# Patient Record
Sex: MALE | Race: WHITE | NOT HISPANIC OR LATINO | Employment: OTHER | URBAN - METROPOLITAN AREA
[De-identification: names, ages, dates, MRNs, and addresses within clinical notes are randomized per-mention and may not be internally consistent; named-entity substitution may affect disease eponyms.]

---

## 2017-01-11 ENCOUNTER — GENERIC CONVERSION - ENCOUNTER (OUTPATIENT)
Dept: OTHER | Facility: OTHER | Age: 82
End: 2017-01-11

## 2017-01-12 ENCOUNTER — GENERIC CONVERSION - ENCOUNTER (OUTPATIENT)
Dept: OTHER | Facility: OTHER | Age: 82
End: 2017-01-12

## 2017-01-17 ENCOUNTER — ALLSCRIPTS OFFICE VISIT (OUTPATIENT)
Dept: OTHER | Facility: OTHER | Age: 82
End: 2017-01-17

## 2017-01-24 ENCOUNTER — ALLSCRIPTS OFFICE VISIT (OUTPATIENT)
Dept: OTHER | Facility: OTHER | Age: 82
End: 2017-01-24

## 2017-03-01 ENCOUNTER — GENERIC CONVERSION - ENCOUNTER (OUTPATIENT)
Dept: OTHER | Facility: OTHER | Age: 82
End: 2017-03-01

## 2017-04-12 ENCOUNTER — GENERIC CONVERSION - ENCOUNTER (OUTPATIENT)
Dept: OTHER | Facility: OTHER | Age: 82
End: 2017-04-12

## 2017-04-17 DIAGNOSIS — E78.5 HYPERLIPIDEMIA: ICD-10-CM

## 2017-04-17 DIAGNOSIS — I25.10 ATHEROSCLEROTIC HEART DISEASE OF NATIVE CORONARY ARTERY WITHOUT ANGINA PECTORIS: ICD-10-CM

## 2017-04-17 DIAGNOSIS — E11.9 TYPE 2 DIABETES MELLITUS WITHOUT COMPLICATIONS (HCC): ICD-10-CM

## 2017-04-17 DIAGNOSIS — E03.9 HYPOTHYROIDISM: ICD-10-CM

## 2017-05-16 ENCOUNTER — LAB CONVERSION - ENCOUNTER (OUTPATIENT)
Dept: OTHER | Facility: OTHER | Age: 82
End: 2017-05-16

## 2017-05-16 LAB
A/G RATIO (HISTORICAL): 1.2 (CALC) (ref 1–2.5)
ALBUMIN SERPL BCP-MCNC: 3.9 G/DL (ref 3.6–5.1)
ALP SERPL-CCNC: 57 U/L (ref 40–115)
ALT SERPL W P-5'-P-CCNC: 30 U/L (ref 9–46)
AST SERPL W P-5'-P-CCNC: 27 U/L (ref 10–35)
BILIRUB SERPL-MCNC: 0.4 MG/DL (ref 0.2–1.2)
BILIRUB UR QL STRIP: NEGATIVE
BUN SERPL-MCNC: 17 MG/DL (ref 7–25)
BUN/CREA RATIO (HISTORICAL): ABNORMAL (CALC) (ref 6–22)
CALCIUM SERPL-MCNC: 9.9 MG/DL (ref 8.6–10.3)
CHLORIDE SERPL-SCNC: 101 MMOL/L (ref 98–110)
CHOLEST SERPL-MCNC: 189 MG/DL (ref 125–200)
CHOLEST/HDLC SERPL: 5.1 (CALC)
CO2 SERPL-SCNC: 30 MMOL/L (ref 20–31)
COLOR UR: YELLOW
COMMENT (HISTORICAL): CLEAR
CREAT SERPL-MCNC: 0.99 MG/DL (ref 0.7–1.11)
CREATININE, RANDOM URINE (HISTORICAL): 95 MG/DL (ref 20–370)
EGFR AFRICAN AMERICAN (HISTORICAL): 80 ML/MIN/1.73M2
EGFR-AMERICAN CALC (HISTORICAL): 69 ML/MIN/1.73M2
FECAL OCCULT BLOOD DIAGNOSTIC (HISTORICAL): NEGATIVE
GAMMA GLOBULIN (HISTORICAL): 3.2 G/DL (CALC) (ref 1.9–3.7)
GLUCOSE (HISTORICAL): 143 MG/DL (ref 65–99)
GLUCOSE (HISTORICAL): ABNORMAL
HBA1C MFR BLD HPLC: 8.2 % OF TOTAL HGB
HDLC SERPL-MCNC: 37 MG/DL
KETONES UR STRIP-MCNC: NEGATIVE MG/DL
LDL CHOLESTEROL (HISTORICAL): 118 MG/DL (CALC)
LEUKOCYTE ESTERASE UR QL STRIP: NEGATIVE
MAGNESIUM, UR (HISTORICAL): 3.4 MG/DL
MICROALBUMIN/CREATININE RATIO (HISTORICAL): 36 MCG/MG CREAT
NITRITE UR QL STRIP: NEGATIVE
NON-HDL-CHOL (CHOL-HDL) (HISTORICAL): 152 MG/DL (CALC)
PH UR STRIP.AUTO: 6 [PH] (ref 5–8)
POTASSIUM SERPL-SCNC: 4.9 MMOL/L (ref 3.5–5.3)
PROT UR STRIP-MCNC: NEGATIVE MG/DL
SODIUM SERPL-SCNC: 137 MMOL/L (ref 135–146)
SP GR UR STRIP.AUTO: 1.02 (ref 1–1.03)
T3 SERPL-MCNC: 77 NG/DL (ref 76–181)
T4 FREE SERPL-MCNC: 1.7 NG/DL (ref 0.8–1.8)
TOTAL PROTEIN (HISTORICAL): 7.1 G/DL (ref 6.1–8.1)
TRIGL SERPL-MCNC: 170 MG/DL
TSH SERPL DL<=0.05 MIU/L-ACNC: 1.41 MIU/L (ref 0.4–4.5)

## 2017-05-18 ENCOUNTER — ALLSCRIPTS OFFICE VISIT (OUTPATIENT)
Dept: OTHER | Facility: OTHER | Age: 82
End: 2017-05-18

## 2017-05-22 ENCOUNTER — GENERIC CONVERSION - ENCOUNTER (OUTPATIENT)
Dept: OTHER | Facility: OTHER | Age: 82
End: 2017-05-22

## 2017-05-25 ENCOUNTER — ALLSCRIPTS OFFICE VISIT (OUTPATIENT)
Dept: OTHER | Facility: OTHER | Age: 82
End: 2017-05-25

## 2017-07-19 ENCOUNTER — GENERIC CONVERSION - ENCOUNTER (OUTPATIENT)
Dept: OTHER | Facility: OTHER | Age: 82
End: 2017-07-19

## 2017-07-24 ENCOUNTER — GENERIC CONVERSION - ENCOUNTER (OUTPATIENT)
Dept: OTHER | Facility: OTHER | Age: 82
End: 2017-07-24

## 2017-08-16 ENCOUNTER — LAB CONVERSION - ENCOUNTER (OUTPATIENT)
Dept: OTHER | Facility: OTHER | Age: 82
End: 2017-08-16

## 2017-08-16 LAB
A/G RATIO (HISTORICAL): 1.2 (CALC) (ref 1–2.5)
ALBUMIN SERPL BCP-MCNC: 4.2 G/DL (ref 3.6–5.1)
ALP SERPL-CCNC: 54 U/L (ref 40–115)
ALT SERPL W P-5'-P-CCNC: 37 U/L (ref 9–46)
AST SERPL W P-5'-P-CCNC: 33 U/L (ref 10–35)
BILIRUB SERPL-MCNC: 0.5 MG/DL (ref 0.2–1.2)
BUN SERPL-MCNC: 19 MG/DL (ref 7–25)
BUN/CREA RATIO (HISTORICAL): ABNORMAL (CALC) (ref 6–22)
CALCIUM SERPL-MCNC: 9.5 MG/DL (ref 8.6–10.3)
CHLORIDE SERPL-SCNC: 103 MMOL/L (ref 98–110)
CHOLEST SERPL-MCNC: 155 MG/DL (ref 125–200)
CHOLEST/HDLC SERPL: 3.8 (CALC)
CO2 SERPL-SCNC: 30 MMOL/L (ref 20–31)
CREAT SERPL-MCNC: 1 MG/DL (ref 0.7–1.11)
EGFR AFRICAN AMERICAN (HISTORICAL): 78 ML/MIN/1.73M2
EGFR-AMERICAN CALC (HISTORICAL): 67 ML/MIN/1.73M2
GAMMA GLOBULIN (HISTORICAL): 3.4 G/DL (CALC) (ref 1.9–3.7)
GLUCOSE (HISTORICAL): 176 MG/DL (ref 65–99)
HBA1C MFR BLD HPLC: 8.6 % OF TOTAL HGB
HDLC SERPL-MCNC: 41 MG/DL
LDL CHOLESTEROL (HISTORICAL): 88 MG/DL (CALC)
NON-HDL-CHOL (CHOL-HDL) (HISTORICAL): 114 MG/DL (CALC)
POTASSIUM SERPL-SCNC: 4.8 MMOL/L (ref 3.5–5.3)
SODIUM SERPL-SCNC: 140 MMOL/L (ref 135–146)
TOTAL PROTEIN (HISTORICAL): 7.6 G/DL (ref 6.1–8.1)
TRIGL SERPL-MCNC: 131 MG/DL
TSH SERPL DL<=0.05 MIU/L-ACNC: 1.5 MIU/L (ref 0.4–4.5)

## 2017-08-18 DIAGNOSIS — E78.5 HYPERLIPIDEMIA: ICD-10-CM

## 2017-08-18 DIAGNOSIS — E03.9 HYPOTHYROIDISM: ICD-10-CM

## 2017-08-18 DIAGNOSIS — E11.9 TYPE 2 DIABETES MELLITUS WITHOUT COMPLICATIONS (HCC): ICD-10-CM

## 2017-09-13 ENCOUNTER — GENERIC CONVERSION - ENCOUNTER (OUTPATIENT)
Dept: OTHER | Facility: OTHER | Age: 82
End: 2017-09-13

## 2017-09-19 ENCOUNTER — ALLSCRIPTS OFFICE VISIT (OUTPATIENT)
Dept: OTHER | Facility: OTHER | Age: 82
End: 2017-09-19

## 2017-09-20 ENCOUNTER — ALLSCRIPTS OFFICE VISIT (OUTPATIENT)
Dept: OTHER | Facility: OTHER | Age: 82
End: 2017-09-20

## 2017-09-22 ENCOUNTER — GENERIC CONVERSION - ENCOUNTER (OUTPATIENT)
Dept: OTHER | Facility: OTHER | Age: 82
End: 2017-09-22

## 2017-10-19 ENCOUNTER — ALLSCRIPTS OFFICE VISIT (OUTPATIENT)
Dept: OTHER | Facility: OTHER | Age: 82
End: 2017-10-19

## 2017-10-20 ENCOUNTER — GENERIC CONVERSION - ENCOUNTER (OUTPATIENT)
Dept: OTHER | Facility: OTHER | Age: 82
End: 2017-10-20

## 2018-01-13 VITALS
WEIGHT: 162 LBS | OXYGEN SATURATION: 98 % | BODY MASS INDEX: 24.55 KG/M2 | TEMPERATURE: 97.7 F | HEART RATE: 75 BPM | DIASTOLIC BLOOD PRESSURE: 78 MMHG | HEIGHT: 68 IN | SYSTOLIC BLOOD PRESSURE: 122 MMHG

## 2018-01-13 VITALS
OXYGEN SATURATION: 96 % | BODY MASS INDEX: 25.46 KG/M2 | DIASTOLIC BLOOD PRESSURE: 58 MMHG | HEIGHT: 68 IN | SYSTOLIC BLOOD PRESSURE: 122 MMHG | HEART RATE: 72 BPM | WEIGHT: 168 LBS

## 2018-01-14 VITALS
OXYGEN SATURATION: 95 % | DIASTOLIC BLOOD PRESSURE: 82 MMHG | HEIGHT: 68 IN | WEIGHT: 163.05 LBS | SYSTOLIC BLOOD PRESSURE: 146 MMHG | BODY MASS INDEX: 24.71 KG/M2 | HEART RATE: 85 BPM

## 2018-01-14 VITALS
HEIGHT: 68 IN | HEART RATE: 76 BPM | DIASTOLIC BLOOD PRESSURE: 76 MMHG | SYSTOLIC BLOOD PRESSURE: 124 MMHG | OXYGEN SATURATION: 97 % | WEIGHT: 162 LBS | BODY MASS INDEX: 24.55 KG/M2

## 2018-01-14 VITALS
WEIGHT: 164 LBS | SYSTOLIC BLOOD PRESSURE: 148 MMHG | BODY MASS INDEX: 24.86 KG/M2 | HEIGHT: 68 IN | HEART RATE: 99 BPM | OXYGEN SATURATION: 96 % | DIASTOLIC BLOOD PRESSURE: 76 MMHG

## 2018-01-14 VITALS
HEIGHT: 68 IN | SYSTOLIC BLOOD PRESSURE: 126 MMHG | OXYGEN SATURATION: 97 % | BODY MASS INDEX: 25.5 KG/M2 | HEART RATE: 57 BPM | DIASTOLIC BLOOD PRESSURE: 62 MMHG | WEIGHT: 168.25 LBS

## 2018-01-14 VITALS — WEIGHT: 168 LBS | BODY MASS INDEX: 25.54 KG/M2

## 2018-01-17 ENCOUNTER — GENERIC CONVERSION - ENCOUNTER (OUTPATIENT)
Dept: FAMILY MEDICINE CLINIC | Facility: CLINIC | Age: 83
End: 2018-01-17

## 2018-02-02 DIAGNOSIS — E78.5 HYPERLIPIDEMIA: ICD-10-CM

## 2018-02-02 DIAGNOSIS — E11.9 TYPE 2 DIABETES MELLITUS WITHOUT COMPLICATIONS (HCC): ICD-10-CM

## 2018-02-19 ENCOUNTER — OFFICE VISIT (OUTPATIENT)
Dept: FAMILY MEDICINE CLINIC | Facility: CLINIC | Age: 83
End: 2018-02-19
Payer: MEDICARE

## 2018-02-19 VITALS
HEIGHT: 68 IN | DIASTOLIC BLOOD PRESSURE: 80 MMHG | SYSTOLIC BLOOD PRESSURE: 146 MMHG | WEIGHT: 166.8 LBS | BODY MASS INDEX: 25.28 KG/M2

## 2018-02-19 DIAGNOSIS — I25.10 CORONARY ARTERY DISEASE INVOLVING NATIVE CORONARY ARTERY OF NATIVE HEART WITHOUT ANGINA PECTORIS: ICD-10-CM

## 2018-02-19 DIAGNOSIS — R05.3 CHRONIC COUGH: ICD-10-CM

## 2018-02-19 DIAGNOSIS — E03.9 HYPOTHYROIDISM, UNSPECIFIED TYPE: Primary | ICD-10-CM

## 2018-02-19 DIAGNOSIS — E11.9 TYPE 2 DIABETES MELLITUS WITHOUT COMPLICATION, WITHOUT LONG-TERM CURRENT USE OF INSULIN (HCC): ICD-10-CM

## 2018-02-19 DIAGNOSIS — E78.5 HYPERLIPIDEMIA, UNSPECIFIED HYPERLIPIDEMIA TYPE: ICD-10-CM

## 2018-02-19 DIAGNOSIS — K21.9 GASTROESOPHAGEAL REFLUX DISEASE, ESOPHAGITIS PRESENCE NOT SPECIFIED: ICD-10-CM

## 2018-02-19 PROCEDURE — 99214 OFFICE O/P EST MOD 30 MIN: CPT | Performed by: FAMILY MEDICINE

## 2018-02-19 RX ORDER — GLIPIZIDE 10 MG/1
2 TABLET ORAL 2 TIMES DAILY
COMMUNITY
Start: 2015-12-31 | End: 2018-11-27 | Stop reason: SDUPTHER

## 2018-02-19 RX ORDER — LEVOTHYROXINE SODIUM 0.12 MG/1
1 TABLET ORAL DAILY
COMMUNITY
Start: 2013-12-13 | End: 2018-12-31 | Stop reason: SDUPTHER

## 2018-02-19 RX ORDER — NITROGLYCERIN 0.4 MG/1
1 TABLET SUBLINGUAL
COMMUNITY
Start: 2014-09-23

## 2018-02-19 RX ORDER — METOPROLOL SUCCINATE 25 MG/1
0.5 TABLET, EXTENDED RELEASE ORAL DAILY
COMMUNITY
Start: 2014-06-10 | End: 2019-02-07 | Stop reason: SDUPTHER

## 2018-02-19 RX ORDER — PIOGLITAZONEHYDROCHLORIDE 45 MG/1
1 TABLET ORAL DAILY
COMMUNITY
Start: 2017-05-18 | End: 2021-01-01

## 2018-02-19 RX ORDER — METFORMIN HYDROCHLORIDE 500 MG/1
TABLET, EXTENDED RELEASE ORAL
COMMUNITY
Start: 2017-10-19 | End: 2018-02-19 | Stop reason: SDUPTHER

## 2018-02-19 RX ORDER — OMEPRAZOLE 40 MG/1
CAPSULE, DELAYED RELEASE ORAL
COMMUNITY
Start: 2015-09-29 | End: 2018-09-10 | Stop reason: SDUPTHER

## 2018-02-19 RX ORDER — METFORMIN HYDROCHLORIDE 500 MG/1
500 TABLET, EXTENDED RELEASE ORAL 2 TIMES DAILY WITH MEALS
Qty: 180 TABLET | Refills: 3 | Status: SHIPPED | OUTPATIENT
Start: 2018-02-19 | End: 2019-03-11 | Stop reason: SDUPTHER

## 2018-02-19 RX ORDER — POLYETHYLENE GLYCOL 3350 17 G/17G
POWDER, FOR SOLUTION ORAL
COMMUNITY
Start: 2015-08-21

## 2018-02-19 RX ORDER — LANOLIN ALCOHOL/MO/W.PET/CERES
CREAM (GRAM) TOPICAL
COMMUNITY

## 2018-02-19 RX ORDER — NATEGLINIDE 120 MG/1
TABLET ORAL
COMMUNITY
Start: 2014-08-03 | End: 2018-08-12 | Stop reason: SDUPTHER

## 2018-02-19 RX ORDER — OMEPRAZOLE 20 MG/1
1 CAPSULE, DELAYED RELEASE ORAL DAILY
COMMUNITY
Start: 2015-07-05 | End: 2018-09-06 | Stop reason: SDUPTHER

## 2018-02-19 RX ORDER — ATORVASTATIN CALCIUM 10 MG/1
1 TABLET, FILM COATED ORAL DAILY
COMMUNITY
Start: 2017-05-25 | End: 2018-08-26 | Stop reason: SDUPTHER

## 2018-02-19 NOTE — PROGRESS NOTES
Assessment/Plan:           Problem List Items Addressed This Visit     Coronary artery disease involving native coronary artery of native heart without angina pectoris - Primary    Relevant Medications    metoprolol succinate (TOPROL-XL) 25 mg 24 hr tablet    nitroglycerin (NITROSTAT) 0 4 mg SL tablet    Hyperlipidemia    Relevant Medications    atorvastatin (LIPITOR) 10 mg tablet    Hypothyroidism    Relevant Medications    levothyroxine 125 mcg tablet    metoprolol succinate (TOPROL-XL) 25 mg 24 hr tablet    Type 2 diabetes mellitus without complication, without long-term current use of insulin (HCC)    Relevant Medications    glipiZIDE (GLUCOTROL) 10 mg tablet    metFORMIN (GLUCOPHAGE-XR) 500 mg 24 hr tablet    nateglinide (STARLIX) 120 mg tablet    pioglitazone (ACTOS) 45 mg tablet            Subjective:      Patient ID: Shawn Bueno is a 80 y o  male  HPI    The following portions of the patient's history were reviewed and updated as appropriate: allergies, current medications, past family history, past medical history, past social history, past surgical history and problem list     Review of Systems      Objective:      /80   Ht 5' 8" (1 727 m)   Wt 75 7 kg (166 lb 12 8 oz)   BMI 25 36 kg/m²          Physical Exam   Constitutional: He is oriented to person, place, and time  He appears well-developed and well-nourished  HENT:   Head: Normocephalic and atraumatic  Right Ear: External ear normal    Left Ear: External ear normal    Eyes: EOM are normal  Pupils are equal, round, and reactive to light  Neck: Neck supple  Cardiovascular: Normal rate, regular rhythm and normal heart sounds  Pulmonary/Chest: Effort normal and breath sounds normal    Abdominal: Soft  Bowel sounds are normal    Musculoskeletal: Normal range of motion  Neurological: He is alert and oriented to person, place, and time  Skin: Skin is warm and dry  Psychiatric: He has a normal mood and affect   Thought content normal    Nursing note and vitals reviewed

## 2018-02-19 NOTE — PATIENT INSTRUCTIONS
He will follow up with Cardiology  He is offered pulmonology evaluation which he wished to hold off on at this time

## 2018-02-19 NOTE — PROGRESS NOTES
Assessment/Plan:           Problem List Items Addressed This Visit     Coronary artery disease involving native coronary artery of native heart without angina pectoris    Relevant Medications    metoprolol succinate (TOPROL-XL) 25 mg 24 hr tablet    nitroglycerin (NITROSTAT) 0 4 mg SL tablet    Hyperlipidemia    Relevant Medications    atorvastatin (LIPITOR) 10 mg tablet    Other Relevant Orders    Comprehensive metabolic panel    Lipid panel    Hypothyroidism - Primary    Relevant Medications    levothyroxine 125 mcg tablet    metoprolol succinate (TOPROL-XL) 25 mg 24 hr tablet    Other Relevant Orders    TSH, 3rd generation with T4 reflex    Type 2 diabetes mellitus without complication, without long-term current use of insulin (HCC)    Relevant Medications    glipiZIDE (GLUCOTROL) 10 mg tablet    nateglinide (STARLIX) 120 mg tablet    pioglitazone (ACTOS) 45 mg tablet    metFORMIN (GLUCOPHAGE-XR) 500 mg 24 hr tablet    Other Relevant Orders    Hemoglobin A1c    Chronic cough    Gastroesophageal reflux disease    Relevant Medications    omeprazole (PriLOSEC) 40 MG capsule    omeprazole (PRILOSEC) 20 mg delayed release capsule            Subjective:      Patient ID: Bhaskar Alvarado is a 80 y o  male  Patient comes in for a checkup  He complains of chronic cough  His blood sugars have been running high and he has restarted taking metformin 500 milligrams b i d           The following portions of the patient's history were reviewed and updated as appropriate: allergies, current medications, past family history, past medical history, past social history, past surgical history and problem list     Review of Systems   Constitutional: Negative  HENT: Negative  Respiratory: Negative  Cardiovascular: Negative  Objective:      /80   Ht 5' 8" (1 727 m)   Wt 75 7 kg (166 lb 12 8 oz)   BMI 25 36 kg/m²          Physical Exam   Constitutional: He is oriented to person, place, and time   He appears well-developed and well-nourished  HENT:   Head: Normocephalic and atraumatic  Right Ear: External ear normal    Left Ear: External ear normal    Eyes: EOM are normal  Pupils are equal, round, and reactive to light  Neck: Neck supple  Cardiovascular: Normal rate, regular rhythm and normal heart sounds  Pulses are no weak pulses  Pulses:       Dorsalis pedis pulses are 1+ on the right side, and 1+ on the left side  Posterior tibial pulses are 1+ on the right side, and 1+ on the left side  Pulmonary/Chest: Effort normal and breath sounds normal    Abdominal: Soft  Bowel sounds are normal    Musculoskeletal: Normal range of motion  Feet:   Right Foot:   Skin Integrity: Negative for ulcer, skin breakdown, erythema, warmth, callus or dry skin  Left Foot:   Skin Integrity: Negative for ulcer, skin breakdown, erythema, warmth, callus or dry skin  Neurological: He is alert and oriented to person, place, and time  Skin: Skin is warm and dry  Psychiatric: He has a normal mood and affect  Thought content normal    Nursing note and vitals reviewed  Diabetic Foot Exam    Patient's shoes and socks removed  Right Foot/Ankle   Right Foot Inspection  Skin Exam: skin normal and skin intact no dry skin, no warmth, no callus, no erythema, no maceration, no abnormal color, no pre-ulcer, no ulcer and no callus                          Toe Exam: ROM and strength within normal limits  Sensory   Vibration: intact  Proprioception: intact   Monofilament testing: intact  Vascular    The right DP pulse is 1+  The right PT pulse is 1+  Left Foot/Ankle  Left Foot Inspection  Skin Exam: skin normal and skin intactno dry skin, no warmth, no erythema, no maceration, normal color, no pre-ulcer, no ulcer and no callus                         Toe Exam: ROM and strength within normal limits                   Sensory   Vibration: intact  Proprioception: intact  Monofilament: intact  Vascular    The left DP pulse is 1+  The left PT pulse is 1+  Assign Risk Category:  No deformity present; No loss of protective sensation;  No weak pulses       Risk: 0

## 2018-03-20 ENCOUNTER — TELEPHONE (OUTPATIENT)
Dept: FAMILY MEDICINE CLINIC | Facility: CLINIC | Age: 83
End: 2018-03-20

## 2018-03-20 NOTE — TELEPHONE ENCOUNTER
Pt was called for jury duty - feels he is too old to go , cant hear well,  Etc- can he have an excuse letter ?   Call cell when done

## 2018-03-23 ENCOUNTER — TELEPHONE (OUTPATIENT)
Dept: FAMILY MEDICINE CLINIC | Facility: CLINIC | Age: 83
End: 2018-03-23

## 2018-03-23 ENCOUNTER — DOCUMENTATION (OUTPATIENT)
Dept: FAMILY MEDICINE CLINIC | Facility: CLINIC | Age: 83
End: 2018-03-23

## 2018-04-09 DIAGNOSIS — E11.9 TYPE 2 DIABETES MELLITUS WITHOUT COMPLICATION, WITHOUT LONG-TERM CURRENT USE OF INSULIN (HCC): Primary | ICD-10-CM

## 2018-05-16 LAB
ALBUMIN SERPL-MCNC: 3.9 G/DL (ref 3.6–5.1)
ALBUMIN/GLOB SERPL: 1.2 (CALC) (ref 1–2.5)
ALP SERPL-CCNC: 61 U/L (ref 40–115)
ALT SERPL-CCNC: 16 U/L (ref 9–46)
AST SERPL-CCNC: 19 U/L (ref 10–35)
BILIRUB SERPL-MCNC: 0.5 MG/DL (ref 0.2–1.2)
BUN SERPL-MCNC: 16 MG/DL (ref 7–25)
BUN/CREAT SERPL: ABNORMAL (CALC) (ref 6–22)
CALCIUM SERPL-MCNC: 9.3 MG/DL (ref 8.6–10.3)
CHLORIDE SERPL-SCNC: 105 MMOL/L (ref 98–110)
CHOLEST SERPL-MCNC: 137 MG/DL
CHOLEST/HDLC SERPL: 3.8 (CALC)
CO2 SERPL-SCNC: 27 MMOL/L (ref 20–31)
CREAT SERPL-MCNC: 1.05 MG/DL (ref 0.7–1.11)
GLOBULIN SER CALC-MCNC: 3.3 G/DL (CALC) (ref 1.9–3.7)
GLUCOSE SERPL-MCNC: 131 MG/DL (ref 65–99)
HBA1C MFR BLD: 10 % OF TOTAL HGB
HDLC SERPL-MCNC: 36 MG/DL
LDLC SERPL CALC-MCNC: 74 MG/DL (CALC)
NONHDLC SERPL-MCNC: 101 MG/DL (CALC)
POTASSIUM SERPL-SCNC: 4.7 MMOL/L (ref 3.5–5.3)
PROT SERPL-MCNC: 7.2 G/DL (ref 6.1–8.1)
SL AMB EGFR AFRICAN AMERICAN: 74 ML/MIN/1.73M2
SL AMB EGFR NON AFRICAN AMERICAN: 64 ML/MIN/1.73M2
SODIUM SERPL-SCNC: 141 MMOL/L (ref 135–146)
TRIGL SERPL-MCNC: 162 MG/DL
TSH SERPL-ACNC: 2.85 MIU/L (ref 0.4–4.5)

## 2018-05-21 ENCOUNTER — OFFICE VISIT (OUTPATIENT)
Dept: FAMILY MEDICINE CLINIC | Facility: CLINIC | Age: 83
End: 2018-05-21
Payer: MEDICARE

## 2018-05-21 VITALS
SYSTOLIC BLOOD PRESSURE: 122 MMHG | WEIGHT: 164 LBS | DIASTOLIC BLOOD PRESSURE: 72 MMHG | OXYGEN SATURATION: 98 % | BODY MASS INDEX: 24.94 KG/M2 | HEART RATE: 85 BPM | TEMPERATURE: 98 F

## 2018-05-21 DIAGNOSIS — E11.9 TYPE 2 DIABETES MELLITUS WITHOUT COMPLICATION, WITHOUT LONG-TERM CURRENT USE OF INSULIN (HCC): ICD-10-CM

## 2018-05-21 DIAGNOSIS — T14.8XXA ABRASION: ICD-10-CM

## 2018-05-21 DIAGNOSIS — E03.9 HYPOTHYROIDISM, UNSPECIFIED TYPE: Primary | ICD-10-CM

## 2018-05-21 DIAGNOSIS — E78.5 HYPERLIPIDEMIA, UNSPECIFIED HYPERLIPIDEMIA TYPE: ICD-10-CM

## 2018-05-21 DIAGNOSIS — I25.10 CORONARY ARTERY DISEASE INVOLVING NATIVE CORONARY ARTERY OF NATIVE HEART WITHOUT ANGINA PECTORIS: ICD-10-CM

## 2018-05-21 PROCEDURE — 99214 OFFICE O/P EST MOD 30 MIN: CPT | Performed by: FAMILY MEDICINE

## 2018-05-21 NOTE — PROGRESS NOTES
Assessment/Plan:           Problem List Items Addressed This Visit     CAD (coronary artery disease)     Follow-up with Cardiology         Hyperlipidemia    Relevant Orders    Comprehensive metabolic panel    Lipid panel    Hypothyroidism - Primary    Relevant Orders    T3, uptake    T4, free    TSH, 3rd generation with T4 reflex    Diabetes mellitus, type 2 ([address] )    Relevant Orders    HEMOGLOBIN A1C W/ EAG ESTIMATION    Abrasion     Will dressed with triple antibiotic ointment                 Subjective:      Patient ID: Nora Flores is a 80 y o  male  Patient comes in for a checkup  He has an abrasion on his epigastric area where the necklace from his emergency call button cyst on his stomach  The following portions of the patient's history were reviewed and updated as appropriate: allergies, current medications, past family history, past medical history, past social history, past surgical history and problem list     Review of Systems   Constitutional: Negative  HENT: Negative  Respiratory: Negative  Cardiovascular: Negative  Objective:      /72   Pulse 85   Temp 98 °F (36 7 °C)   Wt 74 4 kg (164 lb)   SpO2 98%   BMI 24 94 kg/m²          Physical Exam   Constitutional: He is oriented to person, place, and time  He appears well-developed and well-nourished  HENT:   Head: Normocephalic and atraumatic  Right Ear: Tympanic membrane normal    Left Ear: Tympanic membrane normal    Eyes: EOM are normal  Pupils are equal, round, and reactive to light  Neck: Neck supple  Cardiovascular: Normal rate, regular rhythm and normal heart sounds  Pulses are no weak pulses  Pulses:       Dorsalis pedis pulses are 1+ on the right side, and 1+ on the left side  Posterior tibial pulses are 1+ on the right side, and 1+ on the left side  Pulmonary/Chest: Effort normal and breath sounds normal    Abdominal: Soft   Bowel sounds are normal    Musculoskeletal: Normal range of motion  Feet:   Left Foot:   Skin Integrity: Negative for ulcer, skin breakdown, erythema, warmth, callus or dry skin  Neurological: He is alert and oriented to person, place, and time  Skin: Skin is warm and dry  Abrasion epigastric area   Psychiatric: He has a normal mood and affect  Thought content normal    Nursing note and vitals reviewed  Diabetic Foot Exam    Patient's shoes and socks removed  Right Foot/Ankle   Right Foot Inspection    Toe Exam: ROM and strength within normal limits  Sensory   Vibration: intact  Proprioception: intact   Monofilament testing: intact  Vascular    The right DP pulse is 1+  The right PT pulse is 1+  Left Foot/Ankle  Left Foot Inspection  Skin Exam: skin normal and skin intactno dry skin, no warmth, no erythema, no maceration, normal color, no pre-ulcer, no ulcer and no callus                         Toe Exam: ROM and strength within normal limits                   Sensory   Vibration: intact  Proprioception: intact  Monofilament: intact  Vascular    The left DP pulse is 1+  The left PT pulse is 1+  Assign Risk Category:  No deformity present; No loss of protective sensation;  No weak pulses       Risk: 0

## 2018-08-12 DIAGNOSIS — E11.9 TYPE 2 DIABETES MELLITUS WITHOUT COMPLICATION, WITHOUT LONG-TERM CURRENT USE OF INSULIN (HCC): Primary | ICD-10-CM

## 2018-08-13 RX ORDER — NATEGLINIDE 120 MG/1
TABLET ORAL
Qty: 270 TABLET | Refills: 3 | Status: SHIPPED | OUTPATIENT
Start: 2018-08-13 | End: 2019-11-29 | Stop reason: SDUPTHER

## 2018-08-26 DIAGNOSIS — E78.5 HLD (HYPERLIPIDEMIA): Primary | ICD-10-CM

## 2018-08-27 RX ORDER — ATORVASTATIN CALCIUM 10 MG/1
TABLET, FILM COATED ORAL
Qty: 30 TABLET | Refills: 8 | Status: SHIPPED | OUTPATIENT
Start: 2018-08-27 | End: 2020-03-04 | Stop reason: SDUPTHER

## 2018-09-06 DIAGNOSIS — K21.9 GASTROESOPHAGEAL REFLUX DISEASE, ESOPHAGITIS PRESENCE NOT SPECIFIED: Primary | ICD-10-CM

## 2018-09-06 RX ORDER — OMEPRAZOLE 20 MG/1
40 CAPSULE, DELAYED RELEASE ORAL DAILY
Qty: 180 CAPSULE | Refills: 3 | Status: SHIPPED | OUTPATIENT
Start: 2018-09-06 | End: 2018-09-24 | Stop reason: SDUPTHER

## 2018-09-10 DIAGNOSIS — K21.9 GASTROESOPHAGEAL REFLUX DISEASE, ESOPHAGITIS PRESENCE NOT SPECIFIED: Primary | ICD-10-CM

## 2018-09-10 RX ORDER — OMEPRAZOLE 40 MG/1
40 CAPSULE, DELAYED RELEASE ORAL DAILY
Qty: 90 CAPSULE | Refills: 3 | Status: SHIPPED | OUTPATIENT
Start: 2018-09-10 | End: 2018-09-24

## 2018-09-24 ENCOUNTER — OFFICE VISIT (OUTPATIENT)
Dept: FAMILY MEDICINE CLINIC | Facility: CLINIC | Age: 83
End: 2018-09-24
Payer: MEDICARE

## 2018-09-24 ENCOUNTER — HOSPITAL ENCOUNTER (OUTPATIENT)
Dept: RADIOLOGY | Facility: HOSPITAL | Age: 83
Discharge: HOME | End: 2018-09-24
Attending: FAMILY MEDICINE
Payer: MEDICARE

## 2018-09-24 VITALS
BODY MASS INDEX: 24.4 KG/M2 | HEART RATE: 76 BPM | DIASTOLIC BLOOD PRESSURE: 62 MMHG | SYSTOLIC BLOOD PRESSURE: 118 MMHG | HEIGHT: 68 IN | WEIGHT: 161 LBS | TEMPERATURE: 97.8 F | OXYGEN SATURATION: 96 %

## 2018-09-24 DIAGNOSIS — R05.3 CHRONIC COUGH: ICD-10-CM

## 2018-09-24 DIAGNOSIS — E03.9 HYPOTHYROIDISM, UNSPECIFIED TYPE: Primary | ICD-10-CM

## 2018-09-24 DIAGNOSIS — E11.9 TYPE 2 DIABETES MELLITUS WITHOUT COMPLICATION, WITHOUT LONG-TERM CURRENT USE OF INSULIN (HCC): ICD-10-CM

## 2018-09-24 DIAGNOSIS — Z00.00 MEDICARE ANNUAL WELLNESS VISIT, SUBSEQUENT: ICD-10-CM

## 2018-09-24 DIAGNOSIS — K21.9 GASTROESOPHAGEAL REFLUX DISEASE, ESOPHAGITIS PRESENCE NOT SPECIFIED: ICD-10-CM

## 2018-09-24 DIAGNOSIS — I25.10 CORONARY ARTERY DISEASE INVOLVING NATIVE CORONARY ARTERY OF NATIVE HEART WITHOUT ANGINA PECTORIS: ICD-10-CM

## 2018-09-24 DIAGNOSIS — E78.5 HYPERLIPIDEMIA, UNSPECIFIED HYPERLIPIDEMIA TYPE: ICD-10-CM

## 2018-09-24 PROCEDURE — 71046 X-RAY EXAM CHEST 2 VIEWS: CPT

## 2018-09-24 PROCEDURE — G0439 PPPS, SUBSEQ VISIT: HCPCS | Performed by: FAMILY MEDICINE

## 2018-09-24 RX ORDER — OMEPRAZOLE 20 MG/1
20 CAPSULE, DELAYED RELEASE ORAL DAILY
Qty: 180 CAPSULE | Refills: 0
Start: 2018-09-24 | End: 2019-03-15

## 2018-09-24 NOTE — PROGRESS NOTES
Assessment/Plan:      Return visit in 4 months with fasting blood work prior to visit     Problem List Items Addressed This Visit     CAD (coronary artery disease)    Hyperlipidemia     Continue Lipitor 10 mg daily         Relevant Orders    Comprehensive metabolic panel    Lipid panel    Hypothyroidism - Primary     Continue levothyroxine 125 mcg daily         Relevant Orders    TSH, 3rd generation with Free T4 reflex    Diabetes mellitus, type 2 (HCC)     Lab Results   Component Value Date    HGBA1C 10 0 (H) 05/15/2018       No results for input(s): POCGLU in the last 72 hours  Blood Sugar Average: Last 72 hrs:   he is taking metformin 500 mg b i d  and glipizide 10 mg 2 b i d  He is not sure if he is taking Starlix  I have written this down for and review with his pharmacist         Relevant Orders    Hemoglobin A1C    Microalbumin / creatinine urine ratio    Urinalysis with reflex to microscopic    Chronic cough    Relevant Orders    XR chest pa & lateral    Acid reflux disease     Continue Prilosec 20 mg daily         Relevant Medications    omeprazole (PRILOSEC) 20 mg delayed release capsule      Other Visit Diagnoses     Medicare annual wellness visit, subsequent                Subjective:      Patient ID: Josephine Vega is a 80 y o  male  Patient comes in for a checkup  He complains of chronic cough which he feels is getting a bit worse  He also is somewhat unsure regarding his medications so I have written his medications down for him to review with his pharmacist         The following portions of the patient's history were reviewed and updated as appropriate: allergies, current medications, past family history, past medical history, past social history, past surgical history and problem list     Review of Systems   Constitutional: Negative  HENT: Negative  Respiratory: Negative  Cardiovascular: Negative            Objective:      /62   Pulse 76   Temp 97 8 °F (36 6 °C)   Ht 5' 8" (1 727 m)   Wt 73 kg (161 lb)   SpO2 96%   BMI 24 48 kg/m²          Physical Exam   Constitutional: He is oriented to person, place, and time  He appears well-developed and well-nourished  HENT:   Head: Normocephalic and atraumatic  Right Ear: Tympanic membrane and external ear normal    Left Ear: Tympanic membrane and external ear normal    Mouth/Throat: Oropharynx is clear and moist    Eyes: EOM are normal  Pupils are equal, round, and reactive to light  Neck: Neck supple  Cardiovascular: Normal rate, regular rhythm and normal heart sounds  Pulmonary/Chest: Effort normal and breath sounds normal    Abdominal: Soft  Bowel sounds are normal    Musculoskeletal: Normal range of motion  Neurological: He is alert and oriented to person, place, and time  Skin: Skin is warm and dry  Psychiatric: He has a normal mood and affect   Thought content normal

## 2018-09-24 NOTE — ASSESSMENT & PLAN NOTE
Lab Results   Component Value Date    HGBA1C 10 0 (H) 05/15/2018       No results for input(s): POCGLU in the last 72 hours  Blood Sugar Average: Last 72 hrs:   he is taking metformin 500 mg b i d  and glipizide 10 mg 2 b i d  He is not sure if he is taking Starlix    I have written this down for and review with his pharmacist

## 2018-11-15 ENCOUNTER — OFFICE VISIT (OUTPATIENT)
Dept: FAMILY MEDICINE CLINIC | Facility: CLINIC | Age: 83
End: 2018-11-15
Payer: MEDICARE

## 2018-11-15 VITALS
HEART RATE: 88 BPM | TEMPERATURE: 98.1 F | DIASTOLIC BLOOD PRESSURE: 70 MMHG | BODY MASS INDEX: 24.1 KG/M2 | OXYGEN SATURATION: 97 % | SYSTOLIC BLOOD PRESSURE: 116 MMHG | HEIGHT: 68 IN | WEIGHT: 159 LBS

## 2018-11-15 DIAGNOSIS — E78.5 HYPERLIPIDEMIA, UNSPECIFIED HYPERLIPIDEMIA TYPE: Primary | ICD-10-CM

## 2018-11-15 DIAGNOSIS — K21.9 GASTROESOPHAGEAL REFLUX DISEASE, ESOPHAGITIS PRESENCE NOT SPECIFIED: ICD-10-CM

## 2018-11-15 DIAGNOSIS — E11.9 TYPE 2 DIABETES MELLITUS WITHOUT COMPLICATION, WITHOUT LONG-TERM CURRENT USE OF INSULIN (HCC): ICD-10-CM

## 2018-11-15 DIAGNOSIS — R05.3 CHRONIC COUGH: ICD-10-CM

## 2018-11-15 DIAGNOSIS — I25.10 CORONARY ARTERY DISEASE INVOLVING NATIVE CORONARY ARTERY OF NATIVE HEART WITHOUT ANGINA PECTORIS: ICD-10-CM

## 2018-11-15 DIAGNOSIS — E03.9 HYPOTHYROIDISM, UNSPECIFIED TYPE: ICD-10-CM

## 2018-11-15 PROCEDURE — 99214 OFFICE O/P EST MOD 30 MIN: CPT | Performed by: FAMILY MEDICINE

## 2018-11-15 NOTE — ASSESSMENT & PLAN NOTE
Lab Results   Component Value Date    HGBA1C 10 0 (H) 05/15/2018       No results for input(s): POCGLU in the last 72 hours  Blood Sugar Average: Last 72 hrs:   sugars are running high  We are going to check a C-peptide to see if he has any insulin left

## 2018-11-15 NOTE — PROGRESS NOTES
Assessment/Plan:      Return visit in 4 months with fasting blood work prior to visit     Problem List Items Addressed This Visit     CAD (coronary artery disease)     Follow-up with Cardiology         Hyperlipidemia - Primary     Continue Lipitor 10 mg daily         Relevant Orders    Lipid panel    Hypothyroidism     Continue levothyroxine 125 mcg daily         Relevant Orders    TSH, 3rd generation with Free T4 reflex    Diabetes mellitus, type 2 (HCC)     Lab Results   Component Value Date    HGBA1C 10 0 (H) 05/15/2018       No results for input(s): POCGLU in the last 72 hours  Blood Sugar Average: Last 72 hrs:   sugars are running high  We are going to check a C-peptide to see if he has any insulin left  Relevant Orders    Glucometer    Hemoglobin A1C    Microalbumin / creatinine urine ratio    Urinalysis with reflex to microscopic    C-peptide    CBC and differential    Comprehensive metabolic panel    Chronic cough    Acid reflux disease     Continue Prilosec 20 mg daily                 Subjective:      Patient ID: Leah Roman is a 80 y o  male  Patient comes in for a checkup  He is getting occasional high blood sugars in the high 200 range  He complains of chronic cough  The following portions of the patient's history were reviewed and updated as appropriate: allergies, current medications, past family history, past medical history, past social history, past surgical history and problem list     Review of Systems   Constitutional: Negative  HENT: Negative  Respiratory: Positive for cough  Negative for shortness of breath  Cardiovascular: Negative  Gastrointestinal: Negative  Objective:      /70   Pulse 88   Temp 98 1 °F (36 7 °C)   Ht 5' 8" (1 727 m)   Wt 72 1 kg (159 lb)   SpO2 97%   BMI 24 18 kg/m²          Physical Exam   Constitutional: He is oriented to person, place, and time  He appears well-developed and well-nourished     HENT:   Head: Normocephalic and atraumatic  Right Ear: Tympanic membrane and external ear normal    Left Ear: Tympanic membrane and external ear normal    Eyes: Pupils are equal, round, and reactive to light  EOM are normal    Neck: Neck supple  Cardiovascular: Normal rate, regular rhythm and normal heart sounds  Pulmonary/Chest: Effort normal and breath sounds normal    Abdominal: Soft  Bowel sounds are normal    Musculoskeletal: Normal range of motion  Neurological: He is alert and oriented to person, place, and time  Skin: Skin is warm and dry  Psychiatric: He has a normal mood and affect   Thought content normal

## 2018-11-21 LAB
LEFT EYE DIABETIC RETINOPATHY: NORMAL
RIGHT EYE DIABETIC RETINOPATHY: NORMAL
SEVERITY (EYE EXAM): NORMAL

## 2018-11-27 DIAGNOSIS — E11.9 TYPE 2 DIABETES MELLITUS WITHOUT COMPLICATION, WITHOUT LONG-TERM CURRENT USE OF INSULIN (HCC): Primary | ICD-10-CM

## 2018-11-27 RX ORDER — GLIPIZIDE 10 MG/1
20 TABLET ORAL 2 TIMES DAILY
Qty: 60 TABLET | Refills: 5 | Status: SHIPPED | OUTPATIENT
Start: 2018-11-27 | End: 2019-01-05 | Stop reason: HOSPADM

## 2018-12-04 ENCOUNTER — APPOINTMENT (EMERGENCY)
Dept: CT IMAGING | Facility: HOSPITAL | Age: 83
DRG: 690 | End: 2018-12-04
Payer: MEDICARE

## 2018-12-04 ENCOUNTER — HOSPITAL ENCOUNTER (INPATIENT)
Facility: HOSPITAL | Age: 83
LOS: 1 days | Discharge: HOME HEALTH | DRG: 690 | End: 2018-12-06
Attending: EMERGENCY MEDICINE | Admitting: INTERNAL MEDICINE
Payer: MEDICARE

## 2018-12-04 DIAGNOSIS — R73.9 HYPERGLYCEMIA: ICD-10-CM

## 2018-12-04 DIAGNOSIS — R42 VERTIGO: Primary | ICD-10-CM

## 2018-12-04 DIAGNOSIS — N39.0 UTI (URINARY TRACT INFECTION): ICD-10-CM

## 2018-12-04 DIAGNOSIS — N40.0 BENIGN PROSTATIC HYPERPLASIA: ICD-10-CM

## 2018-12-04 LAB
ALBUMIN SERPL BCP-MCNC: 2.8 G/DL (ref 3.5–5)
ALP SERPL-CCNC: 74 U/L (ref 46–116)
ALT SERPL W P-5'-P-CCNC: 26 U/L (ref 12–78)
ANION GAP SERPL CALCULATED.3IONS-SCNC: 10 MMOL/L (ref 4–13)
APTT PPP: 30 SECONDS (ref 26–38)
AST SERPL W P-5'-P-CCNC: 20 U/L (ref 5–45)
BACTERIA UR QL AUTO: ABNORMAL /HPF
BASOPHILS # BLD AUTO: 0.04 THOUSANDS/ΜL (ref 0–0.1)
BASOPHILS NFR BLD AUTO: 1 % (ref 0–1)
BILIRUB SERPL-MCNC: 0.4 MG/DL (ref 0.2–1)
BILIRUB UR QL STRIP: NEGATIVE
BUN SERPL-MCNC: 23 MG/DL (ref 5–25)
CALCIUM SERPL-MCNC: 9.2 MG/DL (ref 8.3–10.1)
CHLORIDE SERPL-SCNC: 101 MMOL/L (ref 100–108)
CLARITY UR: ABNORMAL
CO2 SERPL-SCNC: 26 MMOL/L (ref 21–32)
COLOR UR: YELLOW
CREAT SERPL-MCNC: 1.13 MG/DL (ref 0.6–1.3)
EOSINOPHIL # BLD AUTO: 0.12 THOUSAND/ΜL (ref 0–0.61)
EOSINOPHIL NFR BLD AUTO: 2 % (ref 0–6)
ERYTHROCYTE [DISTWIDTH] IN BLOOD BY AUTOMATED COUNT: 13 % (ref 11.6–15.1)
GFR SERPL CREATININE-BSD FRML MDRD: 58 ML/MIN/1.73SQ M
GLUCOSE SERPL-MCNC: 118 MG/DL (ref 65–140)
GLUCOSE SERPL-MCNC: 314 MG/DL (ref 65–140)
GLUCOSE SERPL-MCNC: 362 MG/DL (ref 65–140)
GLUCOSE UR STRIP-MCNC: ABNORMAL MG/DL
HCT VFR BLD AUTO: 39.7 % (ref 36.5–49.3)
HGB BLD-MCNC: 13 G/DL (ref 12–17)
HGB UR QL STRIP.AUTO: ABNORMAL
IMM GRANULOCYTES # BLD AUTO: 0.03 THOUSAND/UL (ref 0–0.2)
IMM GRANULOCYTES NFR BLD AUTO: 1 % (ref 0–2)
INR PPP: 1.17 (ref 0.86–1.17)
KETONES UR STRIP-MCNC: NEGATIVE MG/DL
LEUKOCYTE ESTERASE UR QL STRIP: ABNORMAL
LYMPHOCYTES # BLD AUTO: 0.83 THOUSANDS/ΜL (ref 0.6–4.47)
LYMPHOCYTES NFR BLD AUTO: 15 % (ref 14–44)
MCH RBC QN AUTO: 30.9 PG (ref 26.8–34.3)
MCHC RBC AUTO-ENTMCNC: 32.7 G/DL (ref 31.4–37.4)
MCV RBC AUTO: 94 FL (ref 82–98)
MONOCYTES # BLD AUTO: 0.8 THOUSAND/ΜL (ref 0.17–1.22)
MONOCYTES NFR BLD AUTO: 14 % (ref 4–12)
NEUTROPHILS # BLD AUTO: 3.78 THOUSANDS/ΜL (ref 1.85–7.62)
NEUTS SEG NFR BLD AUTO: 67 % (ref 43–75)
NITRITE UR QL STRIP: NEGATIVE
NON-SQ EPI CELLS URNS QL MICRO: ABNORMAL /HPF
NRBC BLD AUTO-RTO: 0 /100 WBCS
OTHER STN SPEC: ABNORMAL
PH UR STRIP.AUTO: 6 [PH] (ref 4.5–8)
PLATELET # BLD AUTO: 198 THOUSANDS/UL (ref 149–390)
PLATELET # BLD AUTO: 199 THOUSANDS/UL (ref 149–390)
PMV BLD AUTO: 10.4 FL (ref 8.9–12.7)
PMV BLD AUTO: 9.9 FL (ref 8.9–12.7)
POTASSIUM SERPL-SCNC: 4.8 MMOL/L (ref 3.5–5.3)
PROT SERPL-MCNC: 7.4 G/DL (ref 6.4–8.2)
PROT UR STRIP-MCNC: NEGATIVE MG/DL
PROTHROMBIN TIME: 14.8 SECONDS (ref 11.8–14.2)
RBC # BLD AUTO: 4.21 MILLION/UL (ref 3.88–5.62)
RBC #/AREA URNS AUTO: ABNORMAL /HPF
SODIUM SERPL-SCNC: 137 MMOL/L (ref 136–145)
SP GR UR STRIP.AUTO: 1.01 (ref 1–1.03)
TROPONIN I SERPL-MCNC: <0.02 NG/ML
TSH SERPL DL<=0.05 MIU/L-ACNC: 0.81 UIU/ML (ref 0.36–3.74)
TSH SERPL DL<=0.05 MIU/L-ACNC: 0.97 UIU/ML (ref 0.36–3.74)
UROBILINOGEN UR QL STRIP.AUTO: 0.2 E.U./DL
WBC # BLD AUTO: 5.6 THOUSAND/UL (ref 4.31–10.16)
WBC #/AREA URNS AUTO: ABNORMAL /HPF
WBC CLUMPS # UR AUTO: PRESENT /UL

## 2018-12-04 PROCEDURE — 96372 THER/PROPH/DIAG INJ SC/IM: CPT

## 2018-12-04 PROCEDURE — 99285 EMERGENCY DEPT VISIT HI MDM: CPT

## 2018-12-04 PROCEDURE — 81001 URINALYSIS AUTO W/SCOPE: CPT | Performed by: EMERGENCY MEDICINE

## 2018-12-04 PROCEDURE — 87086 URINE CULTURE/COLONY COUNT: CPT | Performed by: INTERNAL MEDICINE

## 2018-12-04 PROCEDURE — 82948 REAGENT STRIP/BLOOD GLUCOSE: CPT

## 2018-12-04 PROCEDURE — 87077 CULTURE AEROBIC IDENTIFY: CPT | Performed by: INTERNAL MEDICINE

## 2018-12-04 PROCEDURE — 87186 SC STD MICRODIL/AGAR DIL: CPT | Performed by: INTERNAL MEDICINE

## 2018-12-04 PROCEDURE — 96361 HYDRATE IV INFUSION ADD-ON: CPT

## 2018-12-04 PROCEDURE — 99220 PR INITIAL OBSERVATION CARE/DAY 70 MINUTES: CPT | Performed by: INTERNAL MEDICINE

## 2018-12-04 PROCEDURE — 85049 AUTOMATED PLATELET COUNT: CPT | Performed by: INTERNAL MEDICINE

## 2018-12-04 PROCEDURE — 84484 ASSAY OF TROPONIN QUANT: CPT | Performed by: EMERGENCY MEDICINE

## 2018-12-04 PROCEDURE — 70450 CT HEAD/BRAIN W/O DYE: CPT

## 2018-12-04 PROCEDURE — 36415 COLL VENOUS BLD VENIPUNCTURE: CPT | Performed by: EMERGENCY MEDICINE

## 2018-12-04 PROCEDURE — 85025 COMPLETE CBC W/AUTO DIFF WBC: CPT | Performed by: EMERGENCY MEDICINE

## 2018-12-04 PROCEDURE — 93005 ELECTROCARDIOGRAM TRACING: CPT

## 2018-12-04 PROCEDURE — 84443 ASSAY THYROID STIM HORMONE: CPT | Performed by: INTERNAL MEDICINE

## 2018-12-04 PROCEDURE — 96360 HYDRATION IV INFUSION INIT: CPT

## 2018-12-04 PROCEDURE — 85610 PROTHROMBIN TIME: CPT | Performed by: EMERGENCY MEDICINE

## 2018-12-04 PROCEDURE — 83036 HEMOGLOBIN GLYCOSYLATED A1C: CPT | Performed by: INTERNAL MEDICINE

## 2018-12-04 PROCEDURE — 84443 ASSAY THYROID STIM HORMONE: CPT | Performed by: EMERGENCY MEDICINE

## 2018-12-04 PROCEDURE — 85730 THROMBOPLASTIN TIME PARTIAL: CPT | Performed by: EMERGENCY MEDICINE

## 2018-12-04 PROCEDURE — 80053 COMPREHEN METABOLIC PANEL: CPT | Performed by: EMERGENCY MEDICINE

## 2018-12-04 RX ORDER — MECLIZINE HCL 12.5 MG/1
12.5 TABLET ORAL 3 TIMES DAILY PRN
Qty: 30 TABLET | Refills: 0 | Status: SHIPPED | OUTPATIENT
Start: 2018-12-04 | End: 2018-12-13

## 2018-12-04 RX ORDER — ATORVASTATIN CALCIUM 10 MG/1
10 TABLET, FILM COATED ORAL DAILY
Status: DISCONTINUED | OUTPATIENT
Start: 2018-12-05 | End: 2018-12-06 | Stop reason: HOSPADM

## 2018-12-04 RX ORDER — CEPHALEXIN 250 MG/1
500 CAPSULE ORAL ONCE
Status: COMPLETED | OUTPATIENT
Start: 2018-12-04 | End: 2018-12-04

## 2018-12-04 RX ORDER — LEVOTHYROXINE SODIUM 0.12 MG/1
125 TABLET ORAL DAILY
Status: DISCONTINUED | OUTPATIENT
Start: 2018-12-05 | End: 2018-12-06 | Stop reason: HOSPADM

## 2018-12-04 RX ORDER — PANTOPRAZOLE SODIUM 40 MG/1
40 TABLET, DELAYED RELEASE ORAL
Status: DISCONTINUED | OUTPATIENT
Start: 2018-12-05 | End: 2018-12-06 | Stop reason: HOSPADM

## 2018-12-04 RX ORDER — ASPIRIN 81 MG/1
81 TABLET, CHEWABLE ORAL DAILY
Status: DISCONTINUED | OUTPATIENT
Start: 2018-12-05 | End: 2018-12-06 | Stop reason: HOSPADM

## 2018-12-04 RX ORDER — ACETAMINOPHEN 325 MG/1
650 TABLET ORAL EVERY 6 HOURS PRN
Status: DISCONTINUED | OUTPATIENT
Start: 2018-12-04 | End: 2018-12-06 | Stop reason: HOSPADM

## 2018-12-04 RX ORDER — CHOLECALCIFEROL (VITAMIN D3) 125 MCG
1000 CAPSULE ORAL DAILY
Status: DISCONTINUED | OUTPATIENT
Start: 2018-12-05 | End: 2018-12-06 | Stop reason: HOSPADM

## 2018-12-04 RX ORDER — GLIPIZIDE 5 MG/1
10 TABLET ORAL
Status: DISCONTINUED | OUTPATIENT
Start: 2018-12-05 | End: 2018-12-06 | Stop reason: HOSPADM

## 2018-12-04 RX ORDER — METFORMIN HYDROCHLORIDE 500 MG/1
500 TABLET, EXTENDED RELEASE ORAL 2 TIMES DAILY WITH MEALS
Status: DISCONTINUED | OUTPATIENT
Start: 2018-12-05 | End: 2018-12-06 | Stop reason: HOSPADM

## 2018-12-04 RX ORDER — ONDANSETRON 2 MG/ML
4 INJECTION INTRAMUSCULAR; INTRAVENOUS EVERY 6 HOURS PRN
Status: DISCONTINUED | OUTPATIENT
Start: 2018-12-04 | End: 2018-12-06 | Stop reason: HOSPADM

## 2018-12-04 RX ORDER — POLYETHYLENE GLYCOL 3350 17 G/17G
17 POWDER, FOR SOLUTION ORAL DAILY
Status: DISCONTINUED | OUTPATIENT
Start: 2018-12-05 | End: 2018-12-06 | Stop reason: HOSPADM

## 2018-12-04 RX ORDER — MECLIZINE HCL 12.5 MG/1
12.5 TABLET ORAL ONCE
Status: COMPLETED | OUTPATIENT
Start: 2018-12-04 | End: 2018-12-04

## 2018-12-04 RX ORDER — METOPROLOL SUCCINATE 25 MG/1
12.5 TABLET, EXTENDED RELEASE ORAL DAILY
Status: DISCONTINUED | OUTPATIENT
Start: 2018-12-05 | End: 2018-12-06 | Stop reason: HOSPADM

## 2018-12-04 RX ORDER — PIOGLITAZONEHYDROCHLORIDE 15 MG/1
45 TABLET ORAL DAILY
Status: DISCONTINUED | OUTPATIENT
Start: 2018-12-05 | End: 2018-12-06 | Stop reason: HOSPADM

## 2018-12-04 RX ORDER — NATEGLINIDE 60 MG/1
120 TABLET ORAL
Status: DISCONTINUED | OUTPATIENT
Start: 2018-12-05 | End: 2018-12-06 | Stop reason: HOSPADM

## 2018-12-04 RX ORDER — HEPARIN SODIUM 5000 [USP'U]/ML
5000 INJECTION, SOLUTION INTRAVENOUS; SUBCUTANEOUS EVERY 8 HOURS SCHEDULED
Status: DISCONTINUED | OUTPATIENT
Start: 2018-12-04 | End: 2018-12-06 | Stop reason: HOSPADM

## 2018-12-04 RX ORDER — CEPHALEXIN 250 MG/1
500 CAPSULE ORAL 4 TIMES DAILY
Qty: 28 CAPSULE | Refills: 0 | Status: SHIPPED | OUTPATIENT
Start: 2018-12-04 | End: 2018-12-04 | Stop reason: ALTCHOICE

## 2018-12-04 RX ADMIN — SODIUM CHLORIDE 500 ML: 0.9 INJECTION, SOLUTION INTRAVENOUS at 15:12

## 2018-12-04 RX ADMIN — MECLIZINE 12.5 MG: 12.5 TABLET ORAL at 14:00

## 2018-12-04 RX ADMIN — SODIUM CHLORIDE 500 ML: 0.9 INJECTION, SOLUTION INTRAVENOUS at 13:55

## 2018-12-04 RX ADMIN — CEPHALEXIN 500 MG: 250 CAPSULE ORAL at 16:47

## 2018-12-04 RX ADMIN — HEPARIN SODIUM 5000 UNITS: 5000 INJECTION, SOLUTION INTRAVENOUS; SUBCUTANEOUS at 22:33

## 2018-12-04 RX ADMIN — INSULIN HUMAN 10 UNITS: 100 INJECTION, SOLUTION PARENTERAL at 15:17

## 2018-12-04 NOTE — ASSESSMENT & PLAN NOTE
Lab Results   Component Value Date    HGBA1C 10 0 (H) 05/15/2018       Recent Labs      12/04/18   1613   POCGLU  314*       Blood Sugar Average: Last 72 hrs:  (P) 314   Patient generally takes glipizide, metformin and Starlix for his diabetes  Appears uncontrolled  Will check a hemoglobin A1c and use sliding scale insulin along with his oral agents  Will continue with metformin

## 2018-12-04 NOTE — ASSESSMENT & PLAN NOTE
Patient presents with symptoms of urinary frequency and vertigo  UA appears to be positive  The patient states after current treatment feeling somewhat better but still mildly unsteady on his feet  Patient will be admitted for further treatment of urinary tract  · The patient is use meclizine in the past for vertigo  The will treat for UTI and leave p r n  Meclizine patient seems to be better after slight hydration

## 2018-12-04 NOTE — DISCHARGE INSTRUCTIONS
Managing Diabetes During Sick Days   WHAT YOU NEED TO KNOW:   Sick day management is a plan you develop with healthcare providers to control your blood sugar level when you are sick  Your blood sugar level can rise because of stress from illness, surgery, or injury  Your plan will help prevent high blood sugar levels and other serious health conditions  DISCHARGE INSTRUCTIONS:   Call 911 for any of the following:   · You have trouble breathing  Return to the emergency department if:   · You cannot keep food and liquids down at all for a few hours  · You have trouble breathing  · You are drowsy or confused  · You are breathing faster than normal      · Your heartbeat is faster than normal, or your heart is pounding  · You are weak or dizzy  Contact your healthcare provider if:   · You have leg cramps  · Your mouth or eyes are dry  · You are vomiting or have diarrhea  · You have a fever  · Your ketone level is higher than healthcare providers have told you it should be  · Your blood sugar level is higher than healthcare providers have told you it should be  · You have questions or concerns about your condition or care  Medicines:   · Insulin or diabetes medicine  help to keep your blood sugar under control  Your healthcare provider will tell you if you need to make changes to how you use your diabetes medicine or insulin  · Take your medicine as directed  Contact your healthcare provider if you think your medicine is not helping or if you have side effects  Tell him of her if you are allergic to any medicine  Keep a list of the medicines, vitamins, and herbs you take  Include the amounts, and when and why you take them  Bring the list or the pill bottles to follow-up visits  Carry your medicine list with you in case of an emergency  Follow up with your healthcare provider as directed:  Write down your questions so you remember to ask them during your visits     What to do during sick days:   · Check your blood sugar level more often than usual   If you have type 2 diabetes, check at least 4 times each day  If you have type 1 diabetes, check every 4 hours  · Check your urine or blood for ketones  Ask your healthcare provider which type of ketone testing is best for you  Ketone urine test kits are sold in pharmacies and some stores  You can also buy a meter to check the amount of ketones in your blood  Ask when and how often to check ketones  Do not exercise if you have ketones in your urine or blood  · Drink liquids as directed  You may need to drink about 8 ounces (1 cup) of liquid each hour  Drink liquids that do not contain sugar  Ask your healthcare provider which liquids are best for you  · Follow your usual meal plan as closely as possible  If you cannot follow your meal plan, eat other foods that are easy for your body to digest  If you are eating less food than normal or cannot eat any foods, drink liquids that contain calories  · Tell others about your sick day plan  Tell others who help you while you are sick about your sick day plan  Put your plan in a place that is easy to find  Your sick day plan may change over time based on your needs  What to drink and eat while you are sick: If your stomach is upset or you are vomiting, the following may be easier to drink and eat   Each of the foods listed below has about 10 to 15 grams of carbohydrate:  · Liquids:      ¨ ? to ½ cup of fruit juice     ¨ ½ cup of regular soda     ¨ 1 cup of milk     ¨ 1 double-stick popsicle     ¨ 1 cup of a sports drink    · Foods:      ¨ ½ cup of regular gelatin or cooked, hot cereal     ¨ ½ cup of sugar-free pudding or ¼ cup of regular pudding     ¨ ½ cup of mashed potatoes, macaroni, or noodles     ¨ ¼ cup of sherbet     ¨ ½ cup of regular ice cream     ¨ 1 slice of dry toast, 6 saltine crackers, or 3 sergey crackers  © 2017 [address] is for End User's use only and may not be sold, redistributed or otherwise used for commercial purposes  All illustrations and images included in CareNotes® are the copyrighted property of [employer]  or Wilfr[employer] Slaughter  The above information is an  only  It is not intended as medical advice for individual conditions or treatments  Talk to your doctor, nurse or pharmacist before following any medical regimen to see if it is safe and effective for you  Vertigo   WHAT YOU NEED TO KNOW:   Vertigo is a condition that causes you to feel dizzy  You may feel that you or everything around you is moving or spinning  You may also feel like you are being pulled down or toward your side  DISCHARGE INSTRUCTIONS:   Return to the emergency department if:   · You have a headache and a stiff neck  · You have shaking chills and a fever  · You vomit over and over with no relief  · You have blood, pus, or fluid coming out of your ears  · You are confused  Contact your healthcare provider if:   · Your symptoms do not get better with treatment  · You have questions about your condition or care  Medicines:   · Medicine  may be given to help relieve your symptoms  · Take your medicine as directed  Contact your healthcare provider if you think your medicine is not helping or if you have side effects  Tell him or her if you are allergic to any medicine  Keep a list of the medicines, vitamins, and herbs you take  Include the amounts, and when and why you take them  Bring the list or the pill bottles to follow-up visits  Carry your medicine list with you in case of an emergency  Manage your symptoms:   · Do not drive , walk without help, or operate heavy machinery when you are dizzy  · Move slowly  when you move from one position to another position  Get up slowly from sitting or lying down  Sit or lie down right away if you feel dizzy  · Drink plenty of liquids    Liquids help prevent dehydration  Ask how much liquid to drink each day and which liquids are best for you  · Vestibular and balance rehabilitation therapy (VBRT)  is used to teach you exercises to improve your balance and strength  These exercises may help decrease your vertigo and improve your balance  Ask for more information about this therapy  Follow up with your healthcare provider as directed:  Write down your questions so you remember to ask them during your visits  © 2017 [address] Information is for End User's use only and may not be sold, redistributed or otherwise used for commercial purposes  All illustrations and images included in CareNotes® are the copyrighted property of [employer]  or Wilfrido Slaughter  The above information is an  only  It is not intended as medical advice for individual conditions or treatments  Talk to your doctor, nurse or pharmacist before following any medical regimen to see if it is safe and effective for you  Dysuria   WHAT YOU NEED TO KNOW:   Dysuria is difficulty urinating, or pain, burning, or discomfort with urination  Dysuria is usually a symptom of another problem  DISCHARGE INSTRUCTIONS:   Return to the emergency department if:   · You have severe back, side, or abdominal pain  · You have fever and shaking chills  · You vomit several times in a row  Contact your healthcare provider if:   · Your symptoms do not go away, even after treatment  · You have questions or concerns about your condition or care  Medicines:   · Medicines  may be given to help treat a bacterial infection or help decrease bladder spasms  · Take your medicine as directed  Contact your healthcare provider if you think your medicine is not helping or if you have side effects  Tell him of her if you are allergic to any medicine  Keep a list of the medicines, vitamins, and herbs you take  Include the amounts, and when and why you take them  Bring the list or the pill bottles to follow-up visits  Carry your medicine list with you in case of an emergency  Follow up with your healthcare provider as directed: Your healthcare provider may also refer you to a urologist or nephrologist to have additional testing  Write down your questions so you remember to ask them during your visits  Manage your dysuria:   · Drink more liquids  Liquids help flush out bacteria that may be causing an infection  Ask your healthcare provider how much liquid to drink each day and which liquids are best for you  · Take sitz baths as directed  Fill a bathtub with 4 to 6 inches of warm water  You may also use a sitz bath pan that fits over a toilet  Sit in the sitz bath for 20 minutes  Do this 2 to 3 times a day, or as directed  The warm water can help decrease pain and swelling  © 2017 [address] [facility] Information is for End User's use only and may not be sold, redistributed or otherwise used for commercial purposes  All illustrations and images included in CareNotes® are the copyrighted property of [employer]  or [employer]frido Slaughter  The above information is an  only  It is not intended as medical advice for individual conditions or treatments  Talk to your doctor, nurse or pharmacist before following any medical regimen to see if it is safe and effective for you

## 2018-12-04 NOTE — ASSESSMENT & PLAN NOTE
Patient will need follow-up as an outpatient with Urology complaining of difficulty with urination  Prostate cancer history in both dad and brother

## 2018-12-04 NOTE — ED PROVIDER NOTES
History  Chief Complaint   Patient presents with    Dizziness     pt reports dizzy episodes off and on, also experiencing urgency and frequency  called EMS  C/o dizziness on and off for years but worse today  Dizziness is worse with moving head side to side  He says that he is eating and drinking okay  No fevers, no n/v, no headaches  Pt  States that he fell last week  No injuries from then            Prior to Admission Medications   Prescriptions Last Dose Informant Patient Reported? Taking? ONE TOUCH ULTRA TEST test strip 12/4/2018 at Unknown time  No Yes   Sig: CHECK BLOOD SUGAR DAILY *DX: E11 9   aspirin (ASPIRIN LOW DOSE) 81 MG tablet 12/3/2018 at Unknown time  Yes Yes   Sig: Take 1 tablet by mouth daily   atorvastatin (LIPITOR) 10 mg tablet 12/3/2018 at Unknown time  No Yes   Sig: TAKE 1 TABLET DAILY     cyanocobalamin (VITAMIN B-12) 1,000 mcg tablet 12/3/2018 at Unknown time  Yes Yes   Sig: Take by mouth   glipiZIDE (GLUCOTROL) 10 mg tablet 12/4/2018 at Unknown time  No Yes   Sig: Take 2 tablets (20 mg total) by mouth 2 (two) times a day   glucose blood (ONE TOUCH ULTRA TEST) test strip 12/4/2018 at Unknown time  Yes Yes   Sig: by In Vitro route   glucose blood test strip 12/4/2018 at Unknown time  Yes Yes   levothyroxine 125 mcg tablet 12/4/2018 at Unknown time  Yes Yes   Sig: Take 1 tablet by mouth daily   metFORMIN (GLUCOPHAGE-XR) 500 mg 24 hr tablet 12/4/2018 at Unknown time  No Yes   Sig: Take 1 tablet (500 mg total) by mouth 2 (two) times a day with meals   metoprolol succinate (TOPROL-XL) 25 mg 24 hr tablet 12/4/2018 at Unknown time  Yes Yes   Sig: Take 0 5 tablets by mouth daily   nateglinide (STARLIX) 120 mg tablet 12/4/2018 at Unknown time  No Yes   Sig: TAKE 1 TABLET THREE TIMES A DAY BEFORE MEALS   nitroglycerin (NITROSTAT) 0 4 mg SL tablet 12/4/2018 at Unknown time  Yes Yes   Sig: Place 1 tablet under the tongue every 5 (five) minutes as needed   omeprazole (PRILOSEC) 20 mg delayed release capsule 12/4/2018 at Unknown time  No Yes   Sig: Take 1 capsule (20 mg total) by mouth daily   pioglitazone (ACTOS) 45 mg tablet 12/4/2018 at Unknown time  Yes Yes   Sig: Take 1 tablet by mouth daily   polyethylene glycol (MIRALAX) 17 g packet 12/4/2018 at Unknown time  Yes Yes   potassium-sodium phosphateS (PHOSPHA 250 NEUTRAL) 155-852-130 mg tablet 12/4/2018 at Unknown time  Yes Yes      Facility-Administered Medications: None       Past Medical History:   Diagnosis Date    BRBPR (bright red blood per rectum)     Last Assessed: 1/11/2016    Chest tightness or pressure     Last Assessed: 3/27/2014    Cholecystitis     Last Assessed: 9/1/2015    Lyme disease     Shortness of breath     Last Assessed: 3/27/2014    Vertigo     Last Assessed: 12/31/2015       Past Surgical History:   Procedure Laterality Date    CATARACT EXTRACTION      Last Assessed: 11/11/2016    HERNIA REPAIR      LAPAROSCOPIC CHOLECYSTECTOMY      TONSILLECTOMY         Family History   Problem Relation Age of Onset    No Known Problems Mother     Bipolar disorder Family         II    Tongue cancer Family     Prostate cancer Family     Other Family         cardiac disorder     I have reviewed and agree with the history as documented  Social History   Substance Use Topics    Smoking status: Never Smoker    Smokeless tobacco: Never Used    Alcohol use No        Review of Systems   Constitutional: Negative for appetite change, fatigue and fever  HENT: Negative for rhinorrhea and sore throat  Respiratory: Negative for cough, shortness of breath and wheezing  Cardiovascular: Negative for chest pain and leg swelling  Gastrointestinal: Negative for abdominal pain, diarrhea and vomiting  Genitourinary: Negative for dysuria and flank pain  Musculoskeletal: Negative for back pain and neck pain  Skin: Negative for rash  Neurological: Positive for dizziness  Negative for syncope and headaches  Psychiatric/Behavioral:        Mood normal       Physical Exam  Physical Exam   Constitutional: He is oriented to person, place, and time  He appears well-developed and well-nourished  HENT:   Head: Normocephalic and atraumatic  Mouth/Throat: Oropharynx is clear and moist    Neck: Normal range of motion  Neck supple  Cardiovascular: Normal rate and regular rhythm  Pulmonary/Chest: Effort normal and breath sounds normal    Abdominal: Soft  There is no tenderness  Musculoskeletal: Normal range of motion  Neurological: He is alert and oriented to person, place, and time  No cranial nerve deficit  Skin: Skin is warm and dry  Nursing note and vitals reviewed        Vital Signs  ED Triage Vitals   Temperature Pulse Respirations Blood Pressure SpO2   12/04/18 1259 12/04/18 1259 12/04/18 1259 12/04/18 1259 12/04/18 1259   (!) 97 3 °F (36 3 °C) 82 16 121/64 95 %      Temp Source Heart Rate Source Patient Position - Orthostatic VS BP Location FiO2 (%)   12/04/18 1923 12/04/18 1505 12/04/18 1505 12/04/18 1259 --   Oral Monitor Lying Left arm       Pain Score       12/04/18 1306       No Pain           Vitals:    12/05/18 0818 12/05/18 1548 12/05/18 2348 12/06/18 0755   BP: 106/53 121/61 156/76 148/78   Pulse: 75 72 68 76   Patient Position - Orthostatic VS: Lying Sitting Lying Lying       Visual Acuity  Visual Acuity      Most Recent Value   L Pupil Size (mm)  3   R Pupil Size (mm)  3          ED Medications  Medications   aspirin chewable tablet 81 mg (81 mg Oral Given 12/6/18 0913)   atorvastatin (LIPITOR) tablet 10 mg (10 mg Oral Given 12/6/18 0913)   cyanocobalamin (VITAMIN B-12) tablet 1,000 mcg (1,000 mcg Oral Given 12/6/18 0914)   glipiZIDE (GLUCOTROL) tablet 10 mg (10 mg Oral Given 12/6/18 0917)   levothyroxine tablet 125 mcg (125 mcg Oral Given 12/6/18 0913)   metFORMIN (GLUCOPHAGE-XR) 24 hr tablet 500 mg (500 mg Oral Given 12/6/18 0913)   metoprolol succinate (TOPROL-XL) 24 hr tablet 12 5 mg (12 5 mg Oral Given 12/6/18 0913)   nateglinide (STARLIX) tablet 120 mg (120 mg Oral Given 12/6/18 1240)   pantoprazole (PROTONIX) EC tablet 40 mg (40 mg Oral Given 12/6/18 0635)   pioglitazone (ACTOS) tablet 45 mg (45 mg Oral Given 12/6/18 0914)   polyethylene glycol (MIRALAX) packet 17 g (17 g Oral Given 12/6/18 0914)   heparin (porcine) subcutaneous injection 5,000 Units (5,000 Units Subcutaneous Given 12/6/18 1311)   acetaminophen (TYLENOL) tablet 650 mg (not administered)   ondansetron (ZOFRAN) injection 4 mg (not administered)   nystatin (MYCOSTATIN) cream ( Topical Given 12/6/18 0919)   cefTRIAXone (ROCEPHIN) 1,000 mg in dextrose 5 % 50 mL IVPB (1,000 mg Intravenous New Bag 12/6/18 0635)   meclizine (ANTIVERT) tablet 12 5 mg (not administered)   tamsulosin (FLOMAX) capsule 0 4 mg (0 4 mg Oral Given 12/6/18 0916)   sodium chloride 0 9 % bolus 500 mL (0 mL Intravenous Stopped 12/4/18 1455)   meclizine (ANTIVERT) tablet 12 5 mg (12 5 mg Oral Given 12/4/18 1400)   insulin regular (HumuLIN R,NovoLIN R) injection 10 Units (10 Units Subcutaneous Given 12/4/18 1517)   sodium chloride 0 9 % bolus 500 mL (0 mL Intravenous Stopped 12/4/18 1612)   cephalexin (KEFLEX) capsule 500 mg (500 mg Oral Given 12/4/18 1647)   insulin lispro (HumaLOG) 100 units/mL subcutaneous injection 10 Units (10 Units Subcutaneous Given 12/6/18 1311)       Diagnostic Studies  Results Reviewed     Procedure Component Value Units Date/Time    Urine culture [[id number]] Collected:  12/04/18 2031    Lab Status:  Preliminary result Specimen:  Urine from Urine, Clean Catch Updated:  12/05/18 1726     Urine Culture Culture results to follow      Basic metabolic panel [[id number]] Collected:  12/05/18 0435    Lab Status:  Final result Specimen:  Blood from Arm, Left Updated:  12/05/18 0515     Sodium 142 mmol/L      Potassium 4 0 mmol/L      Chloride 105 mmol/L      CO2 27 mmol/L      ANION GAP 10 mmol/L      BUN 17 mg/dL      Creatinine 0 88 mg/dL      Glucose 101 mg/dL      Calcium 9 1 mg/dL      eGFR 77 ml/min/1 73sq m     Narrative:         National Kidney Disease Education Program recommendations are as follows:  GFR calculation is accurate only with a steady state creatinine  Chronic Kidney disease less than 60 ml/min/1 73 sq  meters  Kidney failure less than 15 ml/min/1 73 sq  meters  CBC and differential [[id number]]  (Abnormal) Collected:  12/05/18 0435    Lab Status:  Final result Specimen:  Blood from Arm, Left Updated:  12/05/18 0513     WBC 6 63 Thousand/uL      RBC 4 20 Million/uL      Hemoglobin 12 8 g/dL      Hematocrit 39 6 %      MCV 94 fL      MCH 30 5 pg      MCHC 32 3 g/dL      RDW 13 0 %      MPV 10 2 fL      Platelets 626 Thousands/uL      nRBC 0 /100 WBCs      Neutrophils Relative 47 %      Immat GRANS % 1 %      Lymphocytes Relative 29 %      Monocytes Relative 17 (H) %      Eosinophils Relative 5 %      Basophils Relative 1 %      Neutrophils Absolute 3 13 Thousands/µL      Immature Grans Absolute 0 03 Thousand/uL      Lymphocytes Absolute 1 95 Thousands/µL      Monocytes Absolute 1 14 Thousand/µL      Eosinophils Absolute 0 32 Thousand/µL      Basophils Absolute 0 06 Thousands/µL     Hemoglobin A1c w/EAG Estimation (Orders if not completed within the last 90 days) [[id number]]  (Abnormal) Collected:  12/04/18 1936    Lab Status:  Final result Specimen:  Blood from Arm, Right Updated:  12/05/18 0141     Hemoglobin A1C 11 3 (H) %       mg/dl     TSH, 3rd generation [[id number]]  (Normal) Collected:  12/04/18 1936    Lab Status:  Final result Specimen:  Blood from Arm, Right Updated:  12/04/18 2005     TSH 3RD GENERATON 0 969 uIU/mL     Narrative:         Patients undergoing fluorescein dye angiography may retain small amounts of fluorescein in the body for 48-72 hours post procedure  Samples containing fluorescein can produce falsely depressed TSH values   If the patient had this procedure,a specimen should be resubmitted post fluorescein clearance  Platelet count [[id number]]  (Normal) Collected:  12/04/18 1936    Lab Status:  Final result Specimen:  Blood from Arm, Right Updated:  12/04/18 1948     Platelets 232 Thousands/uL      MPV 9 9 fL     Fingerstick Glucose (POCT) [[id number]]  (Abnormal) Collected:  12/04/18 1613    Lab Status:  Final result Updated:  12/04/18 1614     POC Glucose 314 (H) mg/dl     Urine Microscopic [[id number]]  (Abnormal) Collected:  12/04/18 1507    Lab Status:  Final result Specimen:  Urine from Urine, Clean Catch Updated:  12/04/18 1544     RBC, UA 0-1 (A) /hpf      WBC, UA 10-20 (A) /hpf      Epithelial Cells None Seen /hpf      Bacteria, UA Innumerable (A) /hpf      WBC Clumps Present     OTHER OBSERVATIONS Yeast Cells Present    UA w Reflex to Microscopic [[id number]]  (Abnormal) Collected:  12/04/18 1507    Lab Status:  Final result Specimen:  Urine from Urine, Clean Catch Updated:  12/04/18 1522     Color, UA Yellow     Clarity, UA Cloudy     Specific Gravity, UA 1 015     pH, UA 6 0     Leukocytes, UA Trace (A)     Nitrite, UA Negative     Protein, UA Negative mg/dl      Glucose, UA >=1000 (1%) (A) mg/dl      Ketones, UA Negative mg/dl      Urobilinogen, UA 0 2 E U /dl      Bilirubin, UA Negative     Blood, UA Trace-Intact (A)    TSH [[id number]]  (Normal) Collected:  12/04/18 1355    Lab Status:  Final result Specimen:  Blood from Arm, Left Updated:  12/04/18 1430     TSH 3RD GENERATON 0 813 uIU/mL     Narrative:         Patients undergoing fluorescein dye angiography may retain small amounts of fluorescein in the body for 48-72 hours post procedure  Samples containing fluorescein can produce falsely depressed TSH values  If the patient had this procedure,a specimen should be resubmitted post fluorescein clearance      Comprehensive metabolic panel [[id number]]  (Abnormal) Collected:  12/04/18 1355    Lab Status:  Final result Specimen:  Blood from Arm, Left Updated:  12/04/18 1425     Sodium 137 mmol/L Potassium 4 8 mmol/L      Chloride 101 mmol/L      CO2 26 mmol/L      ANION GAP 10 mmol/L      BUN 23 mg/dL      Creatinine 1 13 mg/dL      Glucose 362 (H) mg/dL      Calcium 9 2 mg/dL      AST 20 U/L      ALT 26 U/L      Alkaline Phosphatase 74 U/L      Total Protein 7 4 g/dL      Albumin 2 8 (L) g/dL      Total Bilirubin 0 40 mg/dL      eGFR 58 ml/min/1 73sq m     Narrative:         National Kidney Disease Education Program recommendations are as follows:  GFR calculation is accurate only with a steady state creatinine  Chronic Kidney disease less than 60 ml/min/1 73 sq  meters  Kidney failure less than 15 ml/min/1 73 sq  meters      Troponin I [[id number]]  (Normal) Collected:  12/04/18 1355    Lab Status:  Final result Specimen:  Blood from Arm, Left Updated:  12/04/18 1425     Troponin I <0 02 ng/mL     Protime-INR [[id number]]  (Abnormal) Collected:  12/04/18 1355    Lab Status:  Final result Specimen:  Blood from Arm, Left Updated:  12/04/18 1416     Protime 14 8 (H) seconds      INR 1 17    APTT [[id number]]  (Normal) Collected:  12/04/18 1355    Lab Status:  Final result Specimen:  Blood from Arm, Left Updated:  12/04/18 1416     PTT 30 seconds     CBC and differential [[id number]]  (Abnormal) Collected:  12/04/18 1355    Lab Status:  Final result Specimen:  Blood from Arm, Left Updated:  12/04/18 1404     WBC 5 60 Thousand/uL      RBC 4 21 Million/uL      Hemoglobin 13 0 g/dL      Hematocrit 39 7 %      MCV 94 fL      MCH 30 9 pg      MCHC 32 7 g/dL      RDW 13 0 %      MPV 10 4 fL      Platelets 047 Thousands/uL      nRBC 0 /100 WBCs      Neutrophils Relative 67 %      Immat GRANS % 1 %      Lymphocytes Relative 15 %      Monocytes Relative 14 (H) %      Eosinophils Relative 2 %      Basophils Relative 1 %      Neutrophils Absolute 3 78 Thousands/µL      Immature Grans Absolute 0 03 Thousand/uL      Lymphocytes Absolute 0 83 Thousands/µL      Monocytes Absolute 0 80 Thousand/µL      Eosinophils Absolute 0 12 Thousand/µL      Basophils Absolute 0 04 Thousands/µL                  CT head without contrast   Final Result by Keesha Richards DO (12/04 3301)      No acute intracranial abnormality  Microangiopathic changes  Workstation performed: [id number]                    Procedures  ECG 12 Lead Documentation  Date/Time: 12/4/2018 1:39 PM  Performed by: TAY Moya  Authorized by: TAY Moya     Rate:     ECG rate:  89    ECG rate assessment: normal    Rhythm:     Rhythm: sinus rhythm    ST segments:     ST segments:  Non-specific  Comments:      RBBB (old), ekg similar to old ekg dated 7/22/16           Phone Contacts  ED Phone Contact    ED Course                               MDM  Number of Diagnoses or Management Options  Hyperglycemia:   UTI (urinary tract infection):   Vertigo:      Amount and/or Complexity of Data Reviewed  Clinical lab tests: ordered and reviewed  Tests in the radiology section of CPT®: ordered and reviewed    Risk of Complications, Morbidity, and/or Mortality  Presenting problems: moderate  General comments: Pt  Admitted for further workup /management  CritCare Time    Disposition  Final diagnoses:   Vertigo   UTI (urinary tract infection)   Hyperglycemia     Time reflects when diagnosis was documented in both MDM as applicable and the Disposition within this note     Time User Action Codes Description Comment    12/4/2018  4:29 PM Neil MAYER Add [R42] Vertigo     12/4/2018  4:29 PM Samira Leigh Add [N39 0] UTI (urinary tract infection)     12/4/2018  4:30 PM Samira Leigh Add [R73 9] Hyperglycemia     12/5/2018 10:39 AM Jeremias Canela Add [N40 0] Benign prostatic hyperplasia       ED Disposition     ED Disposition Condition Comment    Admit  I spoke to [facility] and admission was agreed to be Inpt/med surg          Follow-up Information     Follow up With Specialties Details Why Contact Info    Katie Ambriz MD Family Medicine Follow up  [address]  [address] [facility] [address]       Claire Szymanski MD Urology Follow up in 2 week(s)  [address]  [address]            Current Discharge Medication List      START taking these medications    Details   levofloxacin (LEVAQUIN) 750 mg tablet Take 1 tablet (750 mg total) by mouth every 24 hours for 3 days  Qty: 3 tablet, Refills: 0    Associated Diagnoses: UTI (urinary tract infection)      !! meclizine (ANTIVERT) 12 5 MG tablet Take 1 tablet (12 5 mg total) by mouth 3 (three) times a day as needed for dizziness  Qty: 30 tablet, Refills: 0    Associated Diagnoses: Vertigo      !! meclizine (ANTIVERT) 12 5 MG tablet Take 1 tablet (12 5 mg total) by mouth every 8 (eight) hours as needed for dizziness  Qty: 30 tablet, Refills: 0    Associated Diagnoses: Vertigo      tamsulosin (FLOMAX) 0 4 mg Take 1 capsule (0 4 mg total) by mouth daily with dinner for 30 days  Qty: 30 capsule, Refills: 0    Associated Diagnoses: Benign prostatic hyperplasia       !! - Potential duplicate medications found  Please discuss with provider  CONTINUE these medications which have NOT CHANGED    Details   aspirin (ASPIRIN LOW DOSE) 81 MG tablet Take 1 tablet by mouth daily      atorvastatin (LIPITOR) 10 mg tablet TAKE 1 TABLET DAILY  Qty: 30 tablet, Refills: 8    Associated Diagnoses: HLD (hyperlipidemia)      cyanocobalamin (VITAMIN B-12) 1,000 mcg tablet Take by mouth      glipiZIDE (GLUCOTROL) 10 mg tablet Take 2 tablets (20 mg total) by mouth 2 (two) times a day  Qty: 60 tablet, Refills: 5    Associated Diagnoses: Type 2 diabetes mellitus without complication, without long-term current use of insulin ([facility] )      ! ! glucose blood (ONE TOUCH ULTRA TEST) test strip by In Vitro route      !! glucose blood test strip       levothyroxine 125 mcg tablet Take 1 tablet by mouth daily      metFORMIN (GLUCOPHAGE-XR) 500 mg 24 hr tablet Take 1 tablet (500 mg total) by mouth 2 (two) times a day with meals  Qty: 180 tablet, Refills: 3    Associated Diagnoses: Type 2 diabetes mellitus without complication, without long-term current use of insulin ([facility])      metoprolol succinate (TOPROL-XL) 25 mg 24 hr tablet Take 0 5 tablets by mouth daily      nateglinide (STARLIX) 120 mg tablet TAKE 1 TABLET THREE TIMES A DAY BEFORE MEALS  Qty: 270 tablet, Refills: 3    Associated Diagnoses: Type 2 diabetes mellitus without complication, without long-term current use of insulin ([facility])      nitroglycerin (NITROSTAT) 0 4 mg SL tablet Place 1 tablet under the tongue every 5 (five) minutes as needed      omeprazole (PRILOSEC) 20 mg delayed release capsule Take 1 capsule (20 mg total) by mouth daily  Qty: 180 capsule, Refills: 0    Associated Diagnoses: Gastroesophageal reflux disease, esophagitis presence not specified      !! ONE TOUCH ULTRA TEST test strip CHECK BLOOD SUGAR DAILY *DX: E11 9  Qty: 100 each, Refills: 3    Comments: DX Code Needed    Associated Diagnoses: Type 2 diabetes mellitus without complication, without long-term current use of insulin ([facility])      pioglitazone (ACTOS) 45 mg tablet Take 1 tablet by mouth daily      polyethylene glycol (MIRALAX) 17 g packet       potassium-sodium phosphateS (PHOSPHA 250 NEUTRAL) 155-852-130 mg tablet        !! - Potential duplicate medications found  Please discuss with provider            Outpatient Discharge Orders  Discharge Diet     Activity as tolerated         ED Provider  Electronically Signed by           Abdelrahman Mayen MD  12/06/18 4549

## 2018-12-05 LAB
ANION GAP SERPL CALCULATED.3IONS-SCNC: 10 MMOL/L (ref 4–13)
BASOPHILS # BLD AUTO: 0.06 THOUSANDS/ΜL (ref 0–0.1)
BASOPHILS NFR BLD AUTO: 1 % (ref 0–1)
BUN SERPL-MCNC: 17 MG/DL (ref 5–25)
CALCIUM SERPL-MCNC: 9.1 MG/DL (ref 8.3–10.1)
CHLORIDE SERPL-SCNC: 105 MMOL/L (ref 100–108)
CO2 SERPL-SCNC: 27 MMOL/L (ref 21–32)
CREAT SERPL-MCNC: 0.88 MG/DL (ref 0.6–1.3)
EOSINOPHIL # BLD AUTO: 0.32 THOUSAND/ΜL (ref 0–0.61)
EOSINOPHIL NFR BLD AUTO: 5 % (ref 0–6)
ERYTHROCYTE [DISTWIDTH] IN BLOOD BY AUTOMATED COUNT: 13 % (ref 11.6–15.1)
EST. AVERAGE GLUCOSE BLD GHB EST-MCNC: 278 MG/DL
GFR SERPL CREATININE-BSD FRML MDRD: 77 ML/MIN/1.73SQ M
GLUCOSE SERPL-MCNC: 100 MG/DL (ref 65–140)
GLUCOSE SERPL-MCNC: 101 MG/DL (ref 65–140)
GLUCOSE SERPL-MCNC: 198 MG/DL (ref 65–140)
GLUCOSE SERPL-MCNC: 245 MG/DL (ref 65–140)
GLUCOSE SERPL-MCNC: 367 MG/DL (ref 65–140)
HBA1C MFR BLD: 11.3 % (ref 4.2–6.3)
HCT VFR BLD AUTO: 39.6 % (ref 36.5–49.3)
HGB BLD-MCNC: 12.8 G/DL (ref 12–17)
IMM GRANULOCYTES # BLD AUTO: 0.03 THOUSAND/UL (ref 0–0.2)
IMM GRANULOCYTES NFR BLD AUTO: 1 % (ref 0–2)
LYMPHOCYTES # BLD AUTO: 1.95 THOUSANDS/ΜL (ref 0.6–4.47)
LYMPHOCYTES NFR BLD AUTO: 29 % (ref 14–44)
MCH RBC QN AUTO: 30.5 PG (ref 26.8–34.3)
MCHC RBC AUTO-ENTMCNC: 32.3 G/DL (ref 31.4–37.4)
MCV RBC AUTO: 94 FL (ref 82–98)
MONOCYTES # BLD AUTO: 1.14 THOUSAND/ΜL (ref 0.17–1.22)
MONOCYTES NFR BLD AUTO: 17 % (ref 4–12)
NEUTROPHILS # BLD AUTO: 3.13 THOUSANDS/ΜL (ref 1.85–7.62)
NEUTS SEG NFR BLD AUTO: 47 % (ref 43–75)
NRBC BLD AUTO-RTO: 0 /100 WBCS
PLATELET # BLD AUTO: 206 THOUSANDS/UL (ref 149–390)
PMV BLD AUTO: 10.2 FL (ref 8.9–12.7)
POTASSIUM SERPL-SCNC: 4 MMOL/L (ref 3.5–5.3)
RBC # BLD AUTO: 4.2 MILLION/UL (ref 3.88–5.62)
SODIUM SERPL-SCNC: 142 MMOL/L (ref 136–145)
WBC # BLD AUTO: 6.63 THOUSAND/UL (ref 4.31–10.16)

## 2018-12-05 PROCEDURE — G8987 SELF CARE CURRENT STATUS: HCPCS

## 2018-12-05 PROCEDURE — 85025 COMPLETE CBC W/AUTO DIFF WBC: CPT | Performed by: INTERNAL MEDICINE

## 2018-12-05 PROCEDURE — 97166 OT EVAL MOD COMPLEX 45 MIN: CPT

## 2018-12-05 PROCEDURE — 80048 BASIC METABOLIC PNL TOTAL CA: CPT | Performed by: INTERNAL MEDICINE

## 2018-12-05 PROCEDURE — 97163 PT EVAL HIGH COMPLEX 45 MIN: CPT

## 2018-12-05 PROCEDURE — G8988 SELF CARE GOAL STATUS: HCPCS

## 2018-12-05 PROCEDURE — G8978 MOBILITY CURRENT STATUS: HCPCS

## 2018-12-05 PROCEDURE — 82948 REAGENT STRIP/BLOOD GLUCOSE: CPT

## 2018-12-05 PROCEDURE — G8979 MOBILITY GOAL STATUS: HCPCS

## 2018-12-05 PROCEDURE — 99232 SBSQ HOSP IP/OBS MODERATE 35: CPT | Performed by: INTERNAL MEDICINE

## 2018-12-05 RX ORDER — MECLIZINE HCL 12.5 MG/1
12.5 TABLET ORAL EVERY 8 HOURS PRN
Status: DISCONTINUED | OUTPATIENT
Start: 2018-12-05 | End: 2018-12-06 | Stop reason: HOSPADM

## 2018-12-05 RX ORDER — NYSTATIN 100000 U/G
CREAM TOPICAL 2 TIMES DAILY
Status: DISCONTINUED | OUTPATIENT
Start: 2018-12-05 | End: 2018-12-06 | Stop reason: HOSPADM

## 2018-12-05 RX ADMIN — CYANOCOBALAMIN TAB 500 MCG 1000 MCG: 500 TAB at 09:07

## 2018-12-05 RX ADMIN — PANTOPRAZOLE SODIUM 40 MG: 40 TABLET, DELAYED RELEASE ORAL at 05:53

## 2018-12-05 RX ADMIN — ASPIRIN 81 MG 81 MG: 81 TABLET ORAL at 09:07

## 2018-12-05 RX ADMIN — PIOGLITAZONE 45 MG: 15 TABLET ORAL at 09:07

## 2018-12-05 RX ADMIN — HEPARIN SODIUM 5000 UNITS: 5000 INJECTION, SOLUTION INTRAVENOUS; SUBCUTANEOUS at 21:40

## 2018-12-05 RX ADMIN — NYSTATIN: 100000 CREAM TOPICAL at 16:26

## 2018-12-05 RX ADMIN — NATEGLINIDE 120 MG: 60 TABLET, FILM COATED ORAL at 09:08

## 2018-12-05 RX ADMIN — METFORMIN HYDROCHLORIDE 500 MG: 500 TABLET, EXTENDED RELEASE ORAL at 16:26

## 2018-12-05 RX ADMIN — NYSTATIN: 100000 CREAM TOPICAL at 09:12

## 2018-12-05 RX ADMIN — LEVOTHYROXINE SODIUM 125 MCG: 125 TABLET ORAL at 09:09

## 2018-12-05 RX ADMIN — CEFTRIAXONE SODIUM 1000 MG: 10 INJECTION, POWDER, FOR SOLUTION INTRAVENOUS at 06:29

## 2018-12-05 RX ADMIN — HEPARIN SODIUM 5000 UNITS: 5000 INJECTION, SOLUTION INTRAVENOUS; SUBCUTANEOUS at 14:39

## 2018-12-05 RX ADMIN — ATORVASTATIN CALCIUM 10 MG: 10 TABLET, FILM COATED ORAL at 09:07

## 2018-12-05 RX ADMIN — GLIPIZIDE 10 MG: 5 TABLET ORAL at 09:06

## 2018-12-05 RX ADMIN — POLYETHYLENE GLYCOL 3350 17 G: 17 POWDER, FOR SOLUTION ORAL at 09:07

## 2018-12-05 RX ADMIN — HEPARIN SODIUM 5000 UNITS: 5000 INJECTION, SOLUTION INTRAVENOUS; SUBCUTANEOUS at 05:53

## 2018-12-05 RX ADMIN — NATEGLINIDE 120 MG: 60 TABLET, FILM COATED ORAL at 16:26

## 2018-12-05 RX ADMIN — METFORMIN HYDROCHLORIDE 500 MG: 500 TABLET, EXTENDED RELEASE ORAL at 09:06

## 2018-12-05 NOTE — PLAN OF CARE
Problem: OCCUPATIONAL THERAPY ADULT  Goal: Performs self-care activities at highest level of function for planned discharge setting  See evaluation for individualized goals  Treatment Interventions: ADL retraining, Functional transfer training, UE strengthening/ROM, Endurance training, Patient/family training, Equipment evaluation/education, Compensatory technique education, Continued evaluation, Energy conservation, Activityengagement          See flowsheet documentation for full assessment, interventions and recommendations  Limitation: Decreased ADL status, Decreased Safe judgement during ADL, Decreased endurance, Decreased self-care trans, Decreased high-level ADLs  Prognosis: Good  Assessment: Patient is a 80 y o  male seen for OT evaluation s/p admit to [address] [place] [address] on 12/4/2018 w/UTI (urinary tract infection)  Patient presented to ED with symptoms of urinary frequency and vertigo    Commorbidities affecting patient's functional performance at time of assessment include: Lyme Disease, Vertigo, SOB,  CAD, DM, Degenerative Disc Disease (lumbar)  Orders placed for OT evaluation and treatment  Performed at least two patient identifiers during session including name and wristband  Prior to admission, Patient reporting indepependent with ADLs/ IADLs  Patient lives witha friend (significant other)  in a one story house [address], Patient drives, ambulates with  SPC, manages his affairs independently  Personal factors affecting patient at time of initial evaluation include: steps to enter, difficulty performing ADLs and difficulty performing IADLs  Upon evaluation, patient requires set up and contact guard assist for UB ADLs, minimal  assist for LB ADLs, transfers and functional ambulation in room and bathroom with minimal  assist, with the use of Rolling Walker   Occupational performance is affected by the following deficits: decreased functional use of BUEs, decreased muscle strength, degenerative arthritic joint changes, dynamic sit/ stand balance deficit with poor standing tolerance time for self care and functional mobility, decreased safety awareness and postural control and postural alignment deficit, requiring external assistance to complete transitional movements  Therapist completed expanded review of medical records and additional review of physical, cognitive or psychosocial history, clinical examination identifying 3-5 performance deficits, clinical decision making of a moderate complexity, consistent with moderate complexity level evaluation  Patient to benefit from continued Occupational Therapy treatment while in the hospital to address deficits as defined above and maximize level of functional independence with ADLs and functional mobility  Occupational Performance areas to address include: dressing, toilet hygiene, transfer to all surfaces, functional mobility, health maintenance, medication routine/ management, IADLs: safety procedures, IADLs: meal prep/ clean up, Leisure Participation and Social participation  From OT standpoint, recommendation at time of d/c would be Home with family support, [address] and Home OT         OT Discharge Recommendation: Home OT

## 2018-12-05 NOTE — PLAN OF CARE
Problem: PHYSICAL THERAPY ADULT  Goal: Performs mobility at highest level of function for planned discharge setting  See evaluation for individualized goals  Treatment/Interventions: Functional transfer training, LE strengthening/ROM, Elevations, Therapeutic exercise, Endurance training, Patient/family training, Equipment eval/education, Bed mobility, Gait training  Equipment Recommended: Meri Murphy       See flowsheet documentation for full assessment, interventions and recommendations  Prognosis: Good  Problem List: Impaired balance, Decreased mobility, Decreased endurance  Assessment: Pt is 80 y o  male seen for PT evaluation s/p admit to [facility] on 2018 w/ UTI (urinary tract infection)  PT consulted to assess pt's functional mobility and d/c needs  Order placed for PT eval and tx, w/ up w/ A and activity as tolerated order  Performed at least 2 patient identifiers during session: Name and   Comorbidities affecting pt's physical performance at time of assessment include: BRBPR, chest tightness or pressure, cholecystitis, Lyme disease, SOB and vertigo  PTA, pt was independent w/ all functional mobility w/ SPC, ambulates community distances and elevations, ambulates household distances, has 2 RAGHAVENDRA, lives w/ friend in one level house and retired  Personal factors affecting pt at time of IE include: inaccessible home environment, ambulating w/ assistive device, stairs to enter home, inability to navigate community distances, inability to navigate level surfaces w/o external assistance, positive fall history, inability to perform IADLs and inability to perform ADLs  Please find objective findings from PT assessment regarding body systems outlined above with impairments and limitations including impaired balance, decreased endurance, gait deviations, decreased activity tolerance, decreased functional mobility tolerance and fall risk   The following objective measures performed on IE also reveal limitations: Barthel Index: 50/100 and Modified [place]: 4 (moderate/severe disability)  Pt's clinical presentation is currently unstable/unpredictable seen in pt's presentation of ongoing medical management/monitoring, evident in need for assist w/ ambulation (CGA) when usually mobilizing independently and positive fall history  Pt to benefit from continued PT tx to address deficits as defined above and maximize level of functional independent mobility and consistency  From PT/mobility standpoint, recommendation at time of d/c would be Home PT with family support pending progress in order to facilitate return to PLOF  Barriers to Discharge: Inaccessible home environment     Recommendation: Home PT, Home with family support     PT - OK to Discharge: (S) No    See flowsheet documentation for full assessment

## 2018-12-05 NOTE — UTILIZATION REVIEW
Initial Clinical Review    Admission: Date/Time/Statement: OBS  12/4  1654 converted to IP on 12/5 @  1337 for treatment of UTI w/ IV abx   Admitting Physician Alannah Davenport    Level of Care Med Surg    Estimated length of stay More than 2 Midnights    Certification I certify that inpatient services are medically necessary for this patient for a duration of greater than two midnights  See H&P and MD Progress Notes for additional information about the patient's course of treatment  ED: Date/Time/Mode of Arrival:   ED Arrival Information     Expected Arrival Acuity Means of Arrival Escorted By Service Admission Type    - 12/4/2018 12:52 Urgent [facility]ulance [address] Urgent    [address]          Chief Complaint:   Chief Complaint   Patient presents with    Dizziness     pt reports dizzy episodes off and on, also experiencing urgency and frequency  called EMS  History of Illness: Brenda Barriga is a 80 y o  male who presents to ED from home via EMS  with dizziness and urinary frequency on set last 24-48 hours  Patient reports baseline difficulty passing his urine    States in the last 24 hours and he has been dizzy with burning and significant frequency to his urination        ED Vital Signs:   ED Triage Vitals   Temperature Pulse Respirations Blood Pressure SpO2   12/04/18 1259 12/04/18 1259 12/04/18 1259 12/04/18 1259 12/04/18 1259   (!) 97 3 °F (36 3 °C) 82 16 121/64 95 %      Temp Source Heart Rate Source Patient Position - Orthostatic VS BP Location FiO2 (%)   12/04/18 1923 12/04/18 1505 12/04/18 1505 12/04/18 1259 --   Oral Monitor Lying Left arm       Pain Score       12/04/18 1306       No Pain        Wt Readings from Last 1 Encounters:   12/04/18 74 8 kg (165 lb)       Vital Signs (abnormal): wnl     Abnormal Labs/Diagnostic Test Results: gluc  362, alb  2 8, pt inr   14 8 1 17  CT head- wnl     ED Treatment:   Medication Administration from 12/04/2018 1252 to 12/04/2018 2040       Date/Time Order Dose Route Action Action by Comments     12/04/2018 1455 sodium chloride 0 9 % bolus 500 mL 0 mL Intravenous Stopped Elissa Nieves RN      12/04/2018 1355 sodium chloride 0 9 % bolus 500 mL 500 mL Intravenous [place] Jonna Vail RN      12/04/2018 1400 meclizine (ANTIVERT) tablet 12 5 mg 12 5 mg Oral Given Eladio Crook RN      12/04/2018 1517 insulin regular (HumuLIN R,NovoLIN R) injection 10 Units 10 Units Subcutaneous Given Elissa Nieves RN      12/04/2018 1612 sodium chloride 0 9 % bolus 500 mL 0 mL Intravenous Stopped Elissa Nieves RN      12/04/2018 1512 sodium chloride 0 9 % bolus 500 mL 500 mL Intravenous [place] Elissa Nieves, [address] [facility]      12/04/2018 1647 cephalexin (KEFLEX) capsule 500 mg 500 mg Oral Given Elissa Nieves RN           Past Medical/Surgical History: Active Ambulatory Problems     Diagnosis Date Noted    CAD (coronary artery disease) 02/19/2018    Hyperlipidemia 02/19/2018    Hypothyroidism 02/19/2018    Diabetes mellitus, type 2 ([facility] [address] ) 02/19/2018    Chronic cough 02/19/2018    Acid reflux disease 02/19/2018    Benign prostatic hyperplasia 11/17/2014    DDD (degenerative disc disease), lumbar 11/17/2014    Diverticulosis 11/17/2014     Past Medical History:   Diagnosis Date    BRBPR (bright red blood per rectum)     Chest tightness or pressure     Cholecystitis     Lyme disease     Shortness of breath     Vertigo        Admitting Diagnosis: Vertigo [R42]  UTI (urinary tract infection) [N39 0]  Frequent urination [R35 0]  Hyperglycemia [R73 9]    Age/Sex: 80 y o  male    Assessment/Plan:  Vertigo   Assessment & Plan     Patient presents with symptoms of urinary frequency and vertigo  UA appears to be positive  The patient states after current treatment feeling somewhat better but still mildly unsteady on his feet  Patient will be admitted for further treatment of urinary tract    · The patient is use meclizine in the past for vertigo  The will treat for UTI and leave p r n  Meclizine patient seems to be better after slight hydration       UTI (urinary tract infection)   Assessment & Plan     UA appears positive  Will continue with ceftriaxone and follow-up cultures      Hypothyroidism   Assessment & Plan     Continue levothyroxine and check a TSH       Hyperlipidemia   Assessment & Plan     Continue statin   Diabetes mellitus, type 2 (HCC)   Assessment & Plan             Lab Results   Component Value Date     HGBA1C 10 0 (H) 05/15/2018         Recent Labs      12/04/18   1613   POCGLU  314*    Blood Sugar Average: Last 72 hrs:  (P) 314   Patient generally takes glipizide, metformin and Starlix for his diabetes  Appears uncontrolled  Will check a hemoglobin A1c and use sliding scale insulin along with his oral agents  Will continue with metformin         Benign prostatic hyperplasia   Assessment & Plan     Patient will need follow-up as an outpatient with Urology complaining of difficulty with urination  Prostate cancer history in both dad and brother        VTE Prophylaxis: Heparin  / sequential compression device   Code Status:  Full code  POLST: POLST form is not discussed and not completed at this time  Discussion with family:  Updated patient's significant other   Anticipated Length of Stay:  Patient will be admitted on an Observation basis with an anticipated length of stay of  less than 2 midnights     Justification for Hospital Stay:  Treatment for urinary tract infection instability due to dizziness      Admission Orders:  Scheduled Meds:   Current Facility-Administered Medications:  acetaminophen 650 mg Oral Q6H PRN     aspirin 81 mg Oral Daily     atorvastatin 10 mg Oral Daily     cefTRIAXone 1,000 mg Intravenous Q24H  Last Rate: 1,000 mg (12/05/18 0629)   cyanocobalamin 1,000 mcg Oral Daily     glipiZIDE 10 mg Oral Daily Before Breakfast     heparin (porcine) 5,000 Units Subcutaneous [facility] levothyroxine 125 mcg Oral Daily     meclizine 12 5 mg Oral Q8H PRN     metFORMIN 500 mg Oral BID With Meals     metoprolol succinate 12 5 mg Oral Daily     nateglinide 120 mg Oral TID AC     nystatin  Topical BID     ondansetron 4 mg Intravenous Q6H PRN     pantoprazole 40 mg Oral Early Morning     pioglitazone 45 mg Oral Daily     polyethylene glycol 17 g Oral Daily       Fingerstick ac and hs   SCD  OT PT eval   Up w/ assist   Cons carb diet   Act as demetra  12/5  BMP ,CBC

## 2018-12-05 NOTE — H&P
H&P- Harika Lute 7/31/1930, 80 y o  male MRN: [id number]    Unit/Bed#: ED 06 Encounter: [id number]    Primary Care Provider: Marcelina Wing MD   Date and time admitted to hospital: 12/4/2018  1:11 PM        Vertigo   Assessment & Plan    Patient presents with symptoms of urinary frequency and vertigo  UA appears to be positive  The patient states after current treatment feeling somewhat better but still mildly unsteady on his feet  Patient will be admitted for further treatment of urinary tract  · The patient is use meclizine in the past for vertigo  The will treat for UTI and leave p r n  Meclizine patient seems to be better after slight hydration  UTI (urinary tract infection)   Assessment & Plan    UA appears positive  Will continue with ceftriaxone and follow-up cultures     Hypothyroidism   Assessment & Plan    Continue levothyroxine and check a TSH  Hyperlipidemia   Assessment & Plan    Continue statin     Diabetes mellitus, type 2 [facility])   Assessment & Plan    Lab Results   Component Value Date    HGBA1C 10 0 (H) 05/15/2018       Recent Labs      12/04/18   1613   POCGLU  314*       Blood Sugar Average: Last 72 hrs:  (P) 314   Patient generally takes glipizide, metformin and Starlix for his diabetes  Appears uncontrolled  Will check a hemoglobin A1c and use sliding scale insulin along with his oral agents  Will continue with metformin  Benign prostatic hyperplasia   Assessment & Plan    Patient will need follow-up as an outpatient with Urology complaining of difficulty with urination  Prostate cancer history in both dad and brother  VTE Prophylaxis: Heparin  / sequential compression device   Code Status:  Full code  POLST: POLST form is not discussed and not completed at this time    Discussion with family:  Updated patient's significant other    Anticipated Length of Stay:  Patient will be admitted on an Observation basis with an anticipated length of stay of  less than 2 midnights  Justification for Hospital Stay:  Treatment for urinary tract infection instability due to dizziness    Total Time for Visit, including Counseling / Coordination of Care: 1 hour  Greater than 50% of this total time spent on direct patient counseling and coordination of care  Chief Complaint:   Dizziness, urinary frequency    History of Present Illness:    Alexandrea Schwartz is a 80 y o  male who presents with dizziness and urinary frequency on set last 24-48 hours  Patient reports baseline difficulty passing his urine  States in the last 24 hours and he has been dizzy with burning and significant frequency to his urination  Review of Systems:    Review of Systems   Constitutional: Negative for appetite change, chills, diaphoresis, fatigue, fever and unexpected weight change  HENT: Negative for dental problem, ear discharge, ear pain, facial swelling, hearing loss, mouth sores, nosebleeds and rhinorrhea  Eyes: Negative for pain, discharge, redness and visual disturbance  Respiratory: Negative for cough, chest tightness, shortness of breath and wheezing  Cardiovascular: Negative for chest pain, palpitations and leg swelling  Gastrointestinal: Negative for abdominal distention, abdominal pain, blood in stool, constipation, diarrhea, nausea and vomiting  Endocrine: Negative for cold intolerance, heat intolerance, polydipsia, polyphagia and polyuria  Genitourinary: Positive for difficulty urinating, dysuria and penile pain (Small lesion under the glands)  Negative for frequency, hematuria and urgency  Musculoskeletal: Negative for arthralgias and back pain  Skin: Negative for rash and wound  Neurological: Negative for dizziness, weakness, light-headedness, numbness and headaches  Hematological: Negative for adenopathy  Does not bruise/bleed easily  Psychiatric/Behavioral: Negative for confusion and dysphoric mood         Past Medical and Surgical History:     Past Medical History:   Diagnosis Date    BRBPR (bright red blood per rectum)     Last Assessed: 1/11/2016    Chest tightness or pressure     Last Assessed: 3/27/2014    Cholecystitis     Last Assessed: 9/1/2015    Lyme disease     Shortness of breath     Last Assessed: 3/27/2014    Vertigo     Last Assessed: 12/31/2015       Past Surgical History:   Procedure Laterality Date    CATARACT EXTRACTION      Last Assessed: 11/11/2016    HERNIA REPAIR      LAPAROSCOPIC CHOLECYSTECTOMY      TONSILLECTOMY         Meds/Allergies:    Prior to Admission medications    Medication Sig Start Date End Date Taking? Authorizing Provider   aspirin (ASPIRIN LOW DOSE) 81 MG tablet Take 1 tablet by mouth daily 4/8/14   Historical Provider, MD   atorvastatin (LIPITOR) 10 mg tablet TAKE 1 TABLET DAILY   8/27/18 March LIVAN Boateng   cyanocobalamin (VITAMIN B-12) 1,000 mcg tablet Take by mouth    Historical Provider, MD   glipiZIDE (GLUCOTROL) 10 mg tablet Take 2 tablets (20 mg total) by mouth 2 (two) times a day 11/27/18   Yarelis Pyle PA-C   glucose blood (ONE TOUCH ULTRA TEST) test strip by In Vitro route 3/9/15   Historical Provider, MD   glucose blood test strip  8/1/15   Historical Provider, MD   levothyroxine 125 mcg tablet Take 1 tablet by mouth daily 12/13/13   Historical Provider, MD   meclizine (ANTIVERT) 12 5 MG tablet Take 1 tablet (12 5 mg total) by mouth 3 (three) times a day as needed for dizziness 12/4/18   Shanta Deal MD   metFORMIN (GLUCOPHAGE-XR) 500 mg 24 hr tablet Take 1 tablet (500 mg total) by mouth 2 (two) times a day with meals 2/19/18   Cooper Cook MD   metoprolol succinate (TOPROL-XL) 25 mg 24 hr tablet Take 0 5 tablets by mouth daily 6/10/14   Historical Provider, MD   nateglinide (STARLIX) 120 mg tablet TAKE 1 TABLET THREE TIMES A DAY BEFORE MEALS 8/13/18   Cooper Cook MD   nitroglycerin (NITROSTAT) 0 4 mg SL tablet Place 1 tablet under the tongue every 5 (five) minutes as needed 9/23/14   Historical Provider, MD   omeprazole (PRILOSEC) 20 mg delayed release capsule Take 1 capsule (20 mg total) by mouth daily 9/24/18   Latoya Baez MD   ONE TOUCH ULTRA TEST test strip CHECK BLOOD SUGAR DAILY *DX: E11 9 4/9/18   Latoya Baez MD   pioglitazone (ACTOS) 45 mg tablet Take 1 tablet by mouth daily 5/18/17   Historical Provider, MD   polyethylene glycol (MIRALAX) 17 g packet  8/21/15   Historical Provider, MD   potassium-sodium phosphateS (PHOSPHA 250 NEUTRAL) [phone] mg tablet  8/21/15   Historical Provider, MD   cephalexin (KEFLEX) 250 mg capsule Take 2 capsules (500 mg total) by mouth 4 (four) times a day for 7 days 12/4/18 12/4/18  Ruddy Fabry, MD     I have reviewed home medications with patient personally  Allergies: No Known Allergies    Social History:     Marital Status:     Occupation: Worked for [employer], 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 [employer]   Patient Pre-hospital Living Situation:  Lives with an elderly female   Patient Pre-hospital Level of Mobility:  Reports no mobility issues  Patient Pre-hospital Diet Restrictions:  None  Substance Use History:   History   Alcohol Use No     History   Smoking Status    Never Smoker   Smokeless Tobacco    Never Used     History   Drug Use No       Family History:    Family History   Problem Relation Age of Onset    No Known Problems Mother     Bipolar disorder Family         II    Tongue cancer Family     Prostate cancer Family     Other Family         cardiac disorder       Physical Exam:     Vitals:   Blood Pressure: 128/66 (12/04/18 1505)  Pulse: 76 (12/04/18 1505)  Temperature: (!) 97 3 °F (36 3 °C) (12/04/18 1259)  Respirations: 18 (12/04/18 1505)  Weight - Scale: 74 8 kg (165 lb) (12/04/18 1259)  SpO2: 97 % (12/04/18 1505)    Physical Exam    Generally elderly male no acute distress normocephalic atraumatic pupils equal round and reactive to light extraocular muscles intact mucous membranes are moist neck is supple there is no JVD no lymph nodes no carotid bruits  Chest is decreased with poor air entry there is no rhonchi rales or wheezes  Cardiovascular regular rate rhythm positive S1 and S2 no S3-S4 murmur or gallop  Abdomen obese soft nontender nondistended with positive bowel sounds no hepatosplenomegaly no guarding or rebound  Chaperoned and examination of the perineum reveals obvious used infection of the skin with cracking around the head of the glans and cracking around the perirectal area  Extremities no clubbing cyanosis edema  Neurologically patient is awake alert oriented cranial nerves 2-12 appear to be intact gait is somewhat unsteady but intact, sensation intact motor intact  No nystagmus    Additional Data:     Lab Results: I have personally reviewed pertinent reports  Results from last 7 days  Lab Units 12/04/18  1355   WBC Thousand/uL 5 60   HEMOGLOBIN g/dL 13 0   HEMATOCRIT % 39 7   PLATELETS Thousands/uL 199   NEUTROS PCT % 67   LYMPHS PCT % 15   MONOS PCT % 14*   EOS PCT % 2       Results from last 7 days  Lab Units 12/04/18  1355   SODIUM mmol/L 137   POTASSIUM mmol/L 4 8   CHLORIDE mmol/L 101   CO2 mmol/L 26   BUN mg/dL 23   CREATININE mg/dL 1 13   ANION GAP mmol/L 10   CALCIUM mg/dL 9 2   ALBUMIN g/dL 2 8*   TOTAL BILIRUBIN mg/dL 0 40   ALK PHOS U/L 74   ALT U/L 26   AST U/L 20   GLUCOSE RANDOM mg/dL 362*       Results from last 7 days  Lab Units 12/04/18  1355   INR  1 17       Results from last 7 days  Lab Units 12/04/18  1613   POC GLUCOSE mg/dl 314*               Imaging: I have personally reviewed pertinent reports  CT head without contrast   Final Result by Sallie Carpenter DO (12/04 1358)      No acute intracranial abnormality  Microangiopathic changes                    Workstation performed: [id number]             EKG, Pathology, and Other Studies Reviewed on Admission:   · EKG:  Normal sinus rhythm with occasional PACs    [employer] / [address] Records Reviewed: Yes     ** Please Note: This note has been constructed using a voice recognition system   **

## 2018-12-05 NOTE — PHYSICAL THERAPY NOTE
Physical Therapy Evaluation     Patient's Name: Cam Magaña    Admitting Diagnosis  Vertigo [R42]  UTI (urinary tract infection) [N39 0]  Frequent urination [R35 0]  Hyperglycemia [R73 9]    Problem List  Patient Active Problem List   Diagnosis    CAD (coronary artery disease)    Hyperlipidemia    Hypothyroidism    Diabetes mellitus, type 2 ([address] )    Chronic cough    Acid reflux disease    Benign prostatic hyperplasia    DDD (degenerative disc disease), lumbar    Diverticulosis    UTI (urinary tract infection)    Vertigo       Past Medical History  Past Medical History:   Diagnosis Date    BRBPR (bright red blood per rectum)     Last Assessed: 1/11/2016    Chest tightness or pressure     Last Assessed: 3/27/2014    Cholecystitis     Last Assessed: 9/1/2015    Lyme disease     Shortness of breath     Last Assessed: 3/27/2014    Vertigo     Last Assessed: 12/31/2015       Past Surgical History  Past Surgical History:   Procedure Laterality Date    CATARACT EXTRACTION      Last Assessed: 11/11/2016    HERNIA REPAIR      LAPAROSCOPIC CHOLECYSTECTOMY      TONSILLECTOMY          12/05/18 0907   Note Type   Note type Eval only   Pain Assessment   Pain Assessment No/denies pain   Pain Score No Pain   Home Living   Type of [address] [place] [address] One level;Performs ADLs on one level; Able to live on main level with bedroom/bathroom;Stairs to enter with rails  (2 RAGHAVENDRA)   Bathroom Shower/Tub Tub/shower unit   Bathroom Toilet Standard   Bathroom Equipment Shower chair;Grab bars in shower   [address] Cane;Walker; Wheelchair-manual;Other (Comment)  (patient ambulatory with [facility] prior to admission)   Prior Function   Level of [place] Independent with ADLs and functional mobility   Lives With Significant other   Receives Help From Friend(s)   ADL Assistance Independent   IADLs Independent   Falls in the last 6 months 1 to 4  (1 week ago had a fall in the BR) Vocational Retired   Comments Patient drives   Restrictions/Precautions   [facility] Bearing Precautions Per Order No   Braces or Orthoses Other (Comment)  (None per patient)   Other Precautions Fall Risk;Bed Alarm; Chair Alarm   General   Additional Pertinent History HPI: 80 y o  male who presents with dizziness and urinary frequency   Family/Caregiver Present No   Cognition   Overall Cognitive Status WFL   Arousal/Participation Cooperative   Orientation Level Oriented X4   Memory Within functional limits   Following Commands Follows multistep commands with increased time or repetition   Comments Pt agreeable to PT evaluation   RUE Assessment   RUE Assessment WFL  (please refer to OT evaluation for further details)   LUE Assessment   LUE Assessment WFL  (please refer to OT evaluation for further details)   RLE Assessment   RLE Assessment WFL  (grossly assessed with functional mobility: at least 4-/5 )   LLE Assessment   LLE Assessment WFL  (grossly assessed with functional mobility: at least 4-/5 )   Coordination   Movements are Fluid and Coordinated 1   Sensation [facility]   Light Touch   RLE Light Touch Grossly intact   LLE Light Touch Grossly intact   Bed Mobility   Rolling L 5  Supervision   Additional items Assist x 1; Increased time required; Bedrails   Supine to Sit 5  Supervision   Additional items Assist x 1; Increased time required; Bedrails   Transfers   Sit to Stand 4  Minimal assistance  (CGA)   Additional items Assist x 1; Increased time required;Verbal cues   Stand to Sit 4  Minimal assistance  (CGA)   Additional items Assist x 1; Increased time required;Verbal cues;Armrests   Ambulation/Elevation   Gait pattern Decreased foot clearance; Short stride; Improper Weight shift   Gait Assistance 4  Minimal assist  (CGA)   Additional items Assist x 1;Verbal cues   Assistive Device Rolling walker   Distance 10' + 12'   Stair Management Assistance Not tested   Balance   Static Sitting Good   Dynamic Sitting Fair +   Static Standing Fair   Dynamic Standing Fair   Ambulatory Fair -   Endurance Deficit   Endurance Deficit Yes   Endurance Deficit Description fatigue, general deconditioning   Activity Tolerance   Activity Tolerance Patient tolerated treatment well   Nurse Made Aware yes, Taz Cooper, RN verbalized pt appropriate for PT evaluation; made aware of session outcomes; post-session; pt seated in recliner chair, all needs within reach and chair alarm engaged   Assessment   Prognosis Good   Problem List Impaired balance;Decreased mobility; Decreased endurance   Assessment Pt is 80 y o  male seen for PT evaluation s/p [facility] on 2018 w/ UTI (urinary tract infection)  PT consulted to assess pt's functional mobility and d/c needs  Order placed for PT eval and tx, w/ up w/ A and activity as tolerated order  Performed at least 2 patient identifiers during session: Name and   Comorbidities affecting pt's physical performance at time of assessment include: BRBPR, chest tightness or pressure, cholecystitis, Lyme disease, SOB and vertigo  PTA, pt was independent w/ all functional mobility w/ SPC, ambulates community distances and elevations, ambulates household distances, has 2 RAGHAVENDRA, lives w/ friend in one level house and retired  Personal factors affecting pt at time of IE include: inaccessible home environment, ambulating w/ assistive device, stairs to enter home, inability to navigate community distances, inability to navigate level surfaces w/o external assistance, positive fall history, inability to perform IADLs and inability to perform ADLs  Please find objective findings from PT assessment regarding body systems outlined above with impairments and limitations including impaired balance, decreased endurance, gait deviations, decreased activity tolerance, decreased functional mobility tolerance and fall risk   The following objective measures performed on IE also reveal limitations: Barthel Index: 50/100 and Modified [place]: 4 (moderate/severe disability)  Pt's clinical presentation is currently unstable/unpredictable seen in pt's presentation of ongoing medical management/monitoring, evident in need for assist w/ ambulation (CGA) when usually mobilizing independently and positive fall history  Pt to benefit from continued PT tx to address deficits as defined above and maximize level of functional independent mobility and consistency  From PT/mobility standpoint, recommendation at time of d/c would be Home PT with family support pending progress in order to facilitate return to PLOF  Barriers to Discharge Inaccessible home environment   Goals   Patient Goals to go home   STG Expiration Date 12/15/18   Short Term Goal #1 In 7-10 days: Perform all bed mobility tasks modified independent to decrease caregiver burden, Perform all transfers modified independent to improve independence, Ambulate > 150 ft  with least restrictive assistive device modified independent w/o LOB and w/ normalized gait pattern 100% of the time, Navigate 2 stairs modified independent with unilateral handrail to facilitate return to previous living environment, Increase all balance 1/2 grade to decrease risk for falls and Complete exercise program independently    Treatment Day 0   Plan   Treatment/Interventions Functional transfer training;LE strengthening/ROM; Elevations; Therapeutic exercise; Endurance training;Patient/family training;Equipment eval/education; Bed mobility;Gait training   PT Frequency Other (Comment)  (3-5x/wk)   Recommendation   Recommendation Home PT; Home with family support   Equipment Recommended Walker   PT - OK to Discharge No   Additional Comments pt to demonstrate consistency with level surface ambulation and trial stairs prior to d/c   Modified [place] Scale   Modified [place] Scale 4   Barthel Index   Feeding 10   Bathing 0   Grooming Score 5   Dressing Score 5   Bladder Score 5   Bowels Score 10   Toilet Use Score 5   Transfers (Bed/Chair) Score 10   Mobility (Level Surface) Score 0   Stairs Score 0   Barthel Index Score 50         Coleen Munoz, PT, DPT

## 2018-12-05 NOTE — OCCUPATIONAL THERAPY NOTE
Occupational Therapy Evaluation        Patient Name: Margoth Shelton  FCFXM'R Date: 12/5/2018 12/05/18 5965   Note Type   Note type Eval only   Restrictions/Precautions   Weight Bearing Precautions Per Order No   Braces or Orthoses Other (Comment)  (none)   Other Precautions Fall Risk;Bed Alarm; Chair Alarm   Pain Assessment   Pain Assessment No/denies pain   Pain Score No Pain   Home Living   Type of [address] One level;Stairs to enter with rails; Performs ADLs on one level  (2 RAGHAVENDRA)   Bathroom Shower/Tub Tub/shower unit   Bathroom Toilet Standard   Bathroom Equipment Shower chair;Grab bars in shower   [address] Cane;Walker; Wheelchair-manual;Other (Comment)  (ambulatory with [facility])   Prior Function   Level of [place] Independent with ADLs and functional mobility   Lives With Significant other   Receives Help From Friend(s)   ADL Assistance Independent   IADLs Independent   Falls in the last 6 months 1 to 4  (1 week ago had a fall in the BR)   Vocational Retired   Comments Patient drives   Lifestyle   Autonomy Patient reporting indepependent with ADLs/ IADLs  Patient lives witha friend (significant other)  in a one story house 2 RAGHAVENDRA, Patient drives, ambulates with  SPC, manages his affairs independently  Reciprocal Relationships Supportive friends   Psychosocial   Psychosocial (WDL) WDL   ADL   Eating Assistance 6  Modified independent   Grooming Assistance 5  Supervision/Setup   UB Bathing Assistance 5  Supervision/Setup   LB Bathing Assistance 4  Minimal Assistance   UB Dressing Assistance 5  Supervision/Setup   LB Dressing Assistance 4  Minimal Assistance   Toileting Assistance  5  Supervision/Setup   Functional Assistance 5  Supervision/Setup   Bed Mobility   Rolling L 5  Supervision   Additional items Assist x 1; Increased time required;Verbal cues;LE management   Supine to Sit 5  Supervision   Additional items Assist x 1; Increased time required;LE management;Verbal cues   Transfers   Sit to Stand 4  Minimal assistance   Additional items Assist x 1; Increased time required;Verbal cues   Stand to Sit 4  Minimal assistance   Additional items Assist x 1; Increased time required;Verbal cues   Functional Mobility   Functional Mobility 4  Minimal assistance   Additional items Rolling walker   Balance   Static Sitting Good   Dynamic Sitting Fair +   Static Standing Fair   Dynamic Standing Fair   Activity Tolerance   Activity Tolerance Patient tolerated treatment well   Nurse Made Aware Yes JUSTIN Johnston verbalized patient appropriate for therapy, made aware of therapy outcome and recommendations   RUE Assessment   RUE Assessment X  (AROM WFL, strength deficit)   RUE Strength   RUE Overall Strength Deficits  (3+/5)   LUE Assessment   LUE Assessment X  (AROM WFL, strength deficit)   LUE Strength   LUE Overall Strength Deficits  (3+/5)   Hand Function   Gross Motor Coordination Functional   Fine Motor Coordination Functional   Sensation   Light Touch No apparent deficits  (BUEs)   Proprioception   Proprioception No apparent deficits   Vision-Basic Assessment   Current Vision Wears glasses all the time   Patient Visual Report Other (Comment)  (No significant chanegs reported)   Perception   Inattention/Neglect Appears intact   Cognition   Overall Cognitive Status WFL   Arousal/Participation Alert; Responsive; Cooperative   Attention Within functional limits   Orientation Level Oriented X4   Memory Within functional limits   Following Commands Follows all commands and directions without difficulty   Assessment   Limitation Decreased ADL status; Decreased Safe judgement during ADL;Decreased endurance;Decreased self-care trans;Decreased high-level ADLs   Prognosis Good   Assessment Patient is a 80 y o  male seen for OT evaluation s/p admit to [id number] [place] [address] on 12/4/2018 w/UTI (urinary tract infection)   Patient presented to ED with symptoms of urinary frequency and vertigo    Commorbidities affecting patient's functional performance at time of assessment include: Lyme Disease, Vertigo, SOB,  CAD, DM, Degenerative Disc Disease (lumbar)  Orders placed for OT evaluation and treatment  Performed at least two patient identifiers during session including name and wristband  Prior to admission, Patient reporting indepependent with ADLs/ IADLs  Patient lives witha friend (significant other)  in a one story house 2 [address]A, Patient drives, ambulates with  SPC, manages his affairs independently  Personal factors affecting patient at time of initial evaluation include: steps to enter, difficulty performing ADLs and difficulty performing IADLs  Upon evaluation, patient requires set up and contact guard assist for UB ADLs, minimal  assist for LB ADLs, transfers and functional ambulation in room and bathroom with minimal  assist, with the use of Rolling Walker  Occupational performance is affected by the following deficits: decreased functional use of BUEs, decreased muscle strength, degenerative arthritic joint changes, dynamic sit/ stand balance deficit with poor standing tolerance time for self care and functional mobility, decreased safety awareness and postural control and postural alignment deficit, requiring external assistance to complete transitional movements  Therapist completed expanded review of medical records and additional review of physical, cognitive or psychosocial history, clinical examination identifying 3-5 performance deficits, clinical decision making of a moderate complexity, consistent with moderate complexity level evaluation  Patient to benefit from continued Occupational Therapy treatment while in the hospital to address deficits as defined above and maximize level of functional independence with ADLs and functional mobility   Occupational Performance areas to address include: dressing, toilet hygiene, transfer to all surfaces, functional mobility, health maintenance, medication routine/ management, IADLs: safety procedures, IADLs: meal prep/ clean up, Leisure Participation and Social participation  From OT standpoint, recommendation at time of d/c would be Home with family support, [address] and Home OT  Plan   Treatment Interventions ADL retraining;Functional transfer training;UE strengthening/ROM; Endurance training;Patient/family training;Equipment evaluation/education; Compensatory technique education;Continued evaluation; Energy conservation; Activityengagement   Goal Expiration Date 12/19/18   OT Frequency 3-5x/wk   Recommendation   OT Discharge Recommendation Home OT   Barthel Index   Feeding 10   Bathing 0   Grooming Score 5   Dressing Score 5   Bladder Score 10   Bowels Score 10   Toilet Use Score 5   Transfers (Bed/Chair) Score 10   Mobility (Level Surface) Score 0   Stairs Score 0   Barthel Index Score 55   Modified [place] Scale   Modified [place] Scale 4         Occupational Therapy goals: In 7-14 days:     1- Patient will verbalize and demonstrate use of energy conservation/ deep breathing technique and work simplification skills during functional activity with no verbal cues  2-Patient will verbalize and demonstrate good body mechanics and joint protection techniques during  ADLs/ IADLs with no verbal cues  3- Patient will increase OOB/ sitting tolerance to 2-4 hours per day for increased participation in self care and leisure tasks with no s/s of exertion  4-Patient will increase standing tolerance time to 5  minutes with unilateral UE support to complete sink level ADLs@ mod I level   5- Patient will increase sitting tolerance at edge of bed to 20 minutes to complete UB ADLs @ set up assist level  6- Patient will transfer bed to Chair / toilet at Set up assist level with AD as indicated  7- Patient will complete UB ADLs with set up assist  8- Patient will complete LB ADLs with min assist with the use of adaptive equipment    9- Patient will complete toileting hygiene with set up assist/ supervision for thoroughness    10-Patient/ Family  will demonstrate competency with UE Home Exercise Program

## 2018-12-05 NOTE — PROGRESS NOTES
[facility] Internal Medicine Progress Note  Patient: Moses Vigil 80 y o  male   MRN: [id number]  PCP: Little Clement MD  Unit/Bed#: -07 Encounter: [id number]  Date Of Visit: 18    Assessment:    Principal Problem:    UTI (urinary tract infection)  Active Problems:    CAD (coronary artery disease)    Hyperlipidemia    Hypothyroidism    Diabetes mellitus, type 2 (HCC)    Benign prostatic hyperplasia    Vertigo      Plan:    · 1  UTI- on ceftriaxone  · 2  Urinary retention- urology to evaluate  · 3  DM- on ISS  · 4   H/o BPH  · 5  Hypothyroidism- on levothyroxine  · 6  Dizziness- on meclizine prn       VTE Pharmacologic Prophylaxis:   Pharmacologic: Heparin  Mechanical VTE Prophylaxis in Place: Yes    Patient Centered Rounds: I have performed bedside rounds with nursing staff today  Discussions with Specialists or Other Care Team Provider:     Education and Discussions with Family / Patient:     Time Spent for Care: 20 minutes  More than 50% of total time spent on counseling and coordination of care as described above  Current Length of Stay: 0 day(s)    Current Patient Status: Inpatient   Certification Statement: The patient will continue to require additional inpatient hospital stay due to UTI, urinary retention    Discharge Plan / Estimated Discharge Date: am    Code Status: Level 1 - Full Code      Subjective:   Patient seen and examined at bedside  Patient has no new complaints  Objective:     Vitals:   Temp (24hrs), Av °F (36 7 °C), Min:97 6 °F (36 4 °C), Max:98 5 °F (36 9 °C)    Temp:  [97 6 °F (36 4 °C)-98 5 °F (36 9 °C)] 97 6 °F (36 4 °C)  HR:  [75-83] 75  Resp:  [16-18] 18  BP: (106-159)/(53-76) 106/53  SpO2:  [93 %-97 %] 93 %  Body mass index is 25 09 kg/m²  Input and Output Summary (last 24 hours):        Intake/Output Summary (Last 24 hours) at 18 1338  Last data filed at 18 1153   Gross per 24 hour   Intake             1120 ml   Output             1275 ml   Net -155 ml       Physical Exam:     Physical Exam   Constitutional: He is oriented to person, place, and time  He appears well-developed and well-nourished  HENT:   Head: Normocephalic and atraumatic  Eyes: Pupils are equal, round, and reactive to light  Conjunctivae and EOM are normal    Neck: Normal range of motion  Neck supple  No JVD present  No tracheal deviation present  No thyromegaly present  Cardiovascular: Normal rate, regular rhythm and normal heart sounds  Exam reveals no gallop and no friction rub  No murmur heard  Pulmonary/Chest: Effort normal and breath sounds normal  No respiratory distress  He has no wheezes  He has no rales  Abdominal: Soft  Bowel sounds are normal  He exhibits no distension  There is no tenderness  There is no rebound  Musculoskeletal: Normal range of motion  He exhibits no edema  Neurological: He is alert and oriented to person, place, and time  Skin: Skin is warm and dry  No rash noted  No erythema  No pallor  Vitals reviewed  Additional Data:     Labs:      Results from last 7 days  Lab Units 12/05/18  0435   WBC Thousand/uL 6 63   HEMOGLOBIN g/dL 12 8   HEMATOCRIT % 39 6   PLATELETS Thousands/uL 206   NEUTROS PCT % 47   LYMPHS PCT % 29   MONOS PCT % 17*   EOS PCT % 5       Results from last 7 days  Lab Units 12/05/18  0435 12/04/18  1355   POTASSIUM mmol/L 4 0 4 8   CHLORIDE mmol/L 105 101   CO2 mmol/L 27 26   BUN mg/dL 17 23   CREATININE mg/dL 0 88 1 13   CALCIUM mg/dL 9 1 9 2   ALK PHOS U/L  --  74   ALT U/L  --  26   AST U/L  --  20       Results from last 7 days  Lab Units 12/04/18  1355   INR  1 17       * I Have Reviewed All Lab Data Listed Above  * Additional Pertinent Lab Tests Reviewed:  [facility] Admission Reviewed    Imaging:    Imaging Reports Reviewed Today Include:   Imaging Personally Reviewed by Myself Includes:      Recent Cultures (last 7 days):           Last 24 Hours Medication List:     Current Facility-Administered Medications:  acetaminophen 650 mg Oral Q6H PRN Venkat Zaidi MD    aspirin 81 mg Oral Daily Venkat Zaidi MD    atorvastatin 10 mg Oral Daily Venkat Zaidi MD    cefTRIAXone 1,000 mg Intravenous Q24H Venkat Zaidi MD Last Rate: 1,000 mg (12/05/18 0629)   cyanocobalamin 1,000 mcg Oral Daily Venkat Zaidi MD    glipiZIDE 10 mg Oral Daily Before Breakfast Venkat Zaidi MD    heparin (porcine) 5,000 Units Subcutaneous Q8H [address] Venkat Zaidi MD    levothyroxine 125 mcg Oral Daily Venkat Zaidi MD    meclizine 12 5 mg Oral Q8H PRN Luan Bansal MD    metFORMIN 500 mg Oral BID With Meals Venkat Zaidi MD    metoprolol succinate 12 5 mg Oral Daily Venkat Zaidi MD    nateglinide 120 mg Oral TID AC Venkat Zaidi MD    nystatin  Topical BID Venkat Zaidi MD    ondansetron 4 mg Intravenous Q6H PRN Venkat Zaidi MD    pantoprazole 40 mg Oral Early Morning Venkat Zaidi MD    pioglitazone 45 mg Oral Daily Venkat Zaidi MD    polyethylene glycol 17 g Oral Daily Venkat Zaidi MD         Today, Patient Was Seen By: Luan Bansal MD    ** Please Note: This note has been constructed using a voice recognition system   **

## 2018-12-06 ENCOUNTER — TELEPHONE (OUTPATIENT)
Dept: UROLOGY | Facility: CLINIC | Age: 83
End: 2018-12-06

## 2018-12-06 VITALS
DIASTOLIC BLOOD PRESSURE: 78 MMHG | SYSTOLIC BLOOD PRESSURE: 148 MMHG | RESPIRATION RATE: 20 BRPM | HEIGHT: 68 IN | BODY MASS INDEX: 25.01 KG/M2 | WEIGHT: 165 LBS | TEMPERATURE: 97.6 F | OXYGEN SATURATION: 94 % | HEART RATE: 76 BPM

## 2018-12-06 LAB
ANION GAP SERPL CALCULATED.3IONS-SCNC: 8 MMOL/L (ref 4–13)
ATRIAL RATE: 89 BPM
BACTERIA UR CULT: ABNORMAL
BASOPHILS # BLD AUTO: 0.04 THOUSANDS/ΜL (ref 0–0.1)
BASOPHILS NFR BLD AUTO: 1 % (ref 0–1)
BUN SERPL-MCNC: 16 MG/DL (ref 5–25)
CALCIUM SERPL-MCNC: 9.3 MG/DL (ref 8.3–10.1)
CHLORIDE SERPL-SCNC: 104 MMOL/L (ref 100–108)
CO2 SERPL-SCNC: 29 MMOL/L (ref 21–32)
CREAT SERPL-MCNC: 1 MG/DL (ref 0.6–1.3)
EOSINOPHIL # BLD AUTO: 0.29 THOUSAND/ΜL (ref 0–0.61)
EOSINOPHIL NFR BLD AUTO: 5 % (ref 0–6)
ERYTHROCYTE [DISTWIDTH] IN BLOOD BY AUTOMATED COUNT: 12.8 % (ref 11.6–15.1)
GFR SERPL CREATININE-BSD FRML MDRD: 67 ML/MIN/1.73SQ M
GLUCOSE SERPL-MCNC: 226 MG/DL (ref 65–140)
GLUCOSE SERPL-MCNC: 232 MG/DL (ref 65–140)
GLUCOSE SERPL-MCNC: 419 MG/DL (ref 65–140)
GLUCOSE SERPL-MCNC: 441 MG/DL (ref 65–140)
GLUCOSE SERPL-MCNC: 486 MG/DL (ref 65–140)
HCT VFR BLD AUTO: 41.4 % (ref 36.5–49.3)
HGB BLD-MCNC: 13.6 G/DL (ref 12–17)
IMM GRANULOCYTES # BLD AUTO: 0.02 THOUSAND/UL (ref 0–0.2)
IMM GRANULOCYTES NFR BLD AUTO: 0 % (ref 0–2)
LYMPHOCYTES # BLD AUTO: 1.75 THOUSANDS/ΜL (ref 0.6–4.47)
LYMPHOCYTES NFR BLD AUTO: 32 % (ref 14–44)
MCH RBC QN AUTO: 30.7 PG (ref 26.8–34.3)
MCHC RBC AUTO-ENTMCNC: 32.9 G/DL (ref 31.4–37.4)
MCV RBC AUTO: 94 FL (ref 82–98)
MONOCYTES # BLD AUTO: 0.8 THOUSAND/ΜL (ref 0.17–1.22)
MONOCYTES NFR BLD AUTO: 14 % (ref 4–12)
NEUTROPHILS # BLD AUTO: 2.65 THOUSANDS/ΜL (ref 1.85–7.62)
NEUTS SEG NFR BLD AUTO: 48 % (ref 43–75)
NRBC BLD AUTO-RTO: 0 /100 WBCS
P AXIS: 54 DEGREES
PLATELET # BLD AUTO: 199 THOUSANDS/UL (ref 149–390)
PMV BLD AUTO: 9.9 FL (ref 8.9–12.7)
POTASSIUM SERPL-SCNC: 4.5 MMOL/L (ref 3.5–5.3)
PR INTERVAL: 160 MS
QRS AXIS: -85 DEGREES
QRSD INTERVAL: 134 MS
QT INTERVAL: 390 MS
QTC INTERVAL: 474 MS
RBC # BLD AUTO: 4.43 MILLION/UL (ref 3.88–5.62)
SODIUM SERPL-SCNC: 141 MMOL/L (ref 136–145)
T WAVE AXIS: 31 DEGREES
VENTRICULAR RATE: 89 BPM
WBC # BLD AUTO: 5.55 THOUSAND/UL (ref 4.31–10.16)

## 2018-12-06 PROCEDURE — 82948 REAGENT STRIP/BLOOD GLUCOSE: CPT

## 2018-12-06 PROCEDURE — 99222 1ST HOSP IP/OBS MODERATE 55: CPT | Performed by: PHYSICIAN ASSISTANT

## 2018-12-06 PROCEDURE — 99239 HOSP IP/OBS DSCHRG MGMT >30: CPT | Performed by: INTERNAL MEDICINE

## 2018-12-06 PROCEDURE — 93010 ELECTROCARDIOGRAM REPORT: CPT | Performed by: INTERNAL MEDICINE

## 2018-12-06 PROCEDURE — 85025 COMPLETE CBC W/AUTO DIFF WBC: CPT | Performed by: INTERNAL MEDICINE

## 2018-12-06 PROCEDURE — 80048 BASIC METABOLIC PNL TOTAL CA: CPT | Performed by: INTERNAL MEDICINE

## 2018-12-06 PROCEDURE — 97116 GAIT TRAINING THERAPY: CPT

## 2018-12-06 RX ORDER — MECLIZINE HCL 12.5 MG/1
12.5 TABLET ORAL EVERY 8 HOURS PRN
Qty: 30 TABLET | Refills: 0 | Status: SHIPPED | OUTPATIENT
Start: 2018-12-06 | End: 2019-02-19 | Stop reason: SDUPTHER

## 2018-12-06 RX ORDER — TAMSULOSIN HYDROCHLORIDE 0.4 MG/1
0.4 CAPSULE ORAL
Status: DISCONTINUED | OUTPATIENT
Start: 2018-12-06 | End: 2018-12-06 | Stop reason: HOSPADM

## 2018-12-06 RX ORDER — TAMSULOSIN HYDROCHLORIDE 0.4 MG/1
0.4 CAPSULE ORAL
Qty: 30 CAPSULE | Refills: 0 | Status: SHIPPED | OUTPATIENT
Start: 2018-12-07 | End: 2019-02-19 | Stop reason: SDUPTHER

## 2018-12-06 RX ORDER — LEVOFLOXACIN 750 MG/1
750 TABLET ORAL EVERY 24 HOURS
Qty: 3 TABLET | Refills: 0 | Status: SHIPPED | OUTPATIENT
Start: 2018-12-06 | End: 2018-12-09

## 2018-12-06 RX ADMIN — ASPIRIN 81 MG 81 MG: 81 TABLET ORAL at 09:13

## 2018-12-06 RX ADMIN — TAMSULOSIN HYDROCHLORIDE 0.4 MG: 0.4 CAPSULE ORAL at 09:16

## 2018-12-06 RX ADMIN — NATEGLINIDE 120 MG: 60 TABLET, FILM COATED ORAL at 12:40

## 2018-12-06 RX ADMIN — METFORMIN HYDROCHLORIDE 500 MG: 500 TABLET, EXTENDED RELEASE ORAL at 09:13

## 2018-12-06 RX ADMIN — NYSTATIN: 100000 CREAM TOPICAL at 09:19

## 2018-12-06 RX ADMIN — METOPROLOL SUCCINATE 12.5 MG: 25 TABLET, EXTENDED RELEASE ORAL at 09:13

## 2018-12-06 RX ADMIN — NATEGLINIDE 120 MG: 60 TABLET, FILM COATED ORAL at 09:15

## 2018-12-06 RX ADMIN — PANTOPRAZOLE SODIUM 40 MG: 40 TABLET, DELAYED RELEASE ORAL at 06:35

## 2018-12-06 RX ADMIN — POLYETHYLENE GLYCOL 3350 17 G: 17 POWDER, FOR SOLUTION ORAL at 09:14

## 2018-12-06 RX ADMIN — PIOGLITAZONE 45 MG: 15 TABLET ORAL at 09:14

## 2018-12-06 RX ADMIN — INSULIN LISPRO 10 UNITS: 100 INJECTION, SOLUTION INTRAVENOUS; SUBCUTANEOUS at 15:09

## 2018-12-06 RX ADMIN — CYANOCOBALAMIN TAB 500 MCG 1000 MCG: 500 TAB at 09:14

## 2018-12-06 RX ADMIN — HEPARIN SODIUM 5000 UNITS: 5000 INJECTION, SOLUTION INTRAVENOUS; SUBCUTANEOUS at 06:35

## 2018-12-06 RX ADMIN — INSULIN LISPRO 10 UNITS: 100 INJECTION, SOLUTION INTRAVENOUS; SUBCUTANEOUS at 13:11

## 2018-12-06 RX ADMIN — HEPARIN SODIUM 5000 UNITS: 5000 INJECTION, SOLUTION INTRAVENOUS; SUBCUTANEOUS at 13:11

## 2018-12-06 RX ADMIN — ATORVASTATIN CALCIUM 10 MG: 10 TABLET, FILM COATED ORAL at 09:13

## 2018-12-06 RX ADMIN — GLIPIZIDE 10 MG: 5 TABLET ORAL at 09:17

## 2018-12-06 RX ADMIN — LEVOTHYROXINE SODIUM 125 MCG: 125 TABLET ORAL at 09:13

## 2018-12-06 RX ADMIN — CEFTRIAXONE SODIUM 1000 MG: 10 INJECTION, POWDER, FOR SOLUTION INTRAVENOUS at 06:35

## 2018-12-06 NOTE — TELEPHONE ENCOUNTER
Patient has been discharged, could you please help to find appointment in 6 weeks for recheck in [facility] if possible? Thank you!

## 2018-12-06 NOTE — CONSULTS
UROLOGY CONSULTATION NOTE     Patient Identifiers: Christelle Medina (MRN [id number])  Service Requesting Consultation: SLIM  Service Providing Consultation:  Urology, Catherine Ellis PA-C    Date of Service: 12/6/2018    Reason for Consultation: urinary retention, BPH, UTI    History of Present Illness:     Christelle Medina is a 80 y o  old male patient who presented to the emergency department 12/04/2018 in regards to dizziness and urinary frequency  Patient had noticed urinary frequency, dysuria, and urinary difficulties for 24-48 hours prior to presentation  Patient's urinalysis in the emergency department revealed trace leukocytes, nitrite negative, and innumerable bacteria on microscopy  Urine culture currently pending  Patient is currently on Rocephin  Patient is having uncontrolled glucose levels and is a known type 2 diabetic, relatively uncontrolled with hemoglobin A1c of 11 3  Patient also noted some constipation prior to his admission  Since he has been admitted, he has had bowel movements  He does state that he is constipated at baseline  Patient had 2 bladder scans on 12/04/2018 at 331mL and 450 mL  Difficult to determine whether these were bladder scans verses true postvoid residuals  Patient was not on any pharmacotherapy for his prostate or bladder prior to hospital admission  Patient reports a history of prostate cancer in his father and brother      Past Medical, Past Surgical History:     Past Medical History:   Diagnosis Date    BRBPR (bright red blood per rectum)     Last Assessed: 1/11/2016    Chest tightness or pressure     Last Assessed: 3/27/2014    Cholecystitis     Last Assessed: 9/1/2015    Lyme disease     Shortness of breath     Last Assessed: 3/27/2014    Vertigo     Last Assessed: 12/31/2015   :    Past Surgical History:   Procedure Laterality Date    CATARACT EXTRACTION      Last Assessed: 11/11/2016    HERNIA REPAIR      LAPAROSCOPIC CHOLECYSTECTOMY  TONSILLECTOMY     :    Medications, Allergies:     Current Facility-Administered Medications:     acetaminophen (TYLENOL) tablet 650 mg, 650 mg, Oral, Q6H PRN, Yelitza Fernando MD    aspirin chewable tablet 81 mg, 81 mg, Oral, Daily, Yelitza Fernando MD, 81 mg at 12/05/18 0907    atorvastatin (LIPITOR) tablet 10 mg, 10 mg, Oral, Daily, Yelitza Fernando MD, 10 mg at 12/05/18 0907    cefTRIAXone (ROCEPHIN) 1,000 mg in dextrose 5 % 50 mL IVPB, 1,000 mg, Intravenous, Q24H, Yelitza Fernando MD, Last Rate: 100 mL/hr at 12/06/18 0635, 1,000 mg at 12/06/18 8401    cyanocobalamin (VITAMIN B-12) tablet 1,000 mcg, 1,000 mcg, Oral, Daily, Yelitza Fernando MD, 1,000 mcg at 12/05/18 0907    glipiZIDE (GLUCOTROL) tablet 10 mg, 10 mg, Oral, Daily Before Breakfast, Yelitza Fernando MD, 10 mg at 12/05/18 7211    heparin (porcine) subcutaneous injection 5,000 Units, 5,000 Units, Subcutaneous, Q8H [address], 5,000 Units at 12/06/18 7954 **AND** Platelet count, , , Once, Yelitza Fernando MD    levothyroxine tablet 125 mcg, 125 mcg, Oral, Daily, Yelitza Fernando MD, 125 mcg at 12/05/18 0909    meclizine (ANTIVERT) tablet 12 5 mg, 12 5 mg, Oral, Q8H PRN, Yuridia Alexander MD    metFORMIN (GLUCOPHAGE-XR) 24 hr tablet 500 mg, 500 mg, Oral, BID With Meals, Yelitza Fernando MD, 500 mg at 12/05/18 1626    metoprolol succinate (TOPROL-XL) 24 hr tablet 12 5 mg, 12 5 mg, Oral, Daily, Yelitza Fernando MD    nateglinide (STARLIX) tablet 120 mg, 120 mg, Oral, TID AC, Yelitza Fernando MD, 120 mg at 12/05/18 1626    nystatin (MYCOSTATIN) cream, , Topical, BID, Yelitza Fernando MD    ondansetron [facility]) injection 4 mg, 4 mg, Intravenous, Q6H PRN, Yelitza Fernando MD    pantoprazole (PROTONIX) EC tablet 40 mg, 40 mg, Oral, Early Morning, Yelitza Fernando MD, 40 mg at 12/06/18 7376    pioglitazone (ACTOS) tablet 45 mg, 45 mg, Oral, Daily, Yelitza Fernando MD, 45 mg at 12/05/18 0907    polyethylene glycol (MIRALAX) packet 17 g, 17 g, Oral, Daily, Alessia Reis MD, 17 g at 12/05/18 0907    Allergies:  No Known Allergies:    Social and Family History:   Social History:   Social History   Substance Use Topics    Smoking status: Never Smoker    Smokeless tobacco: Never Used    Alcohol use No        History   Smoking Status    Never Smoker   Smokeless Tobacco    Never Used       Family History:  Family History   Problem Relation Age of Onset    No Known Problems Mother     Bipolar disorder Family         II    Tongue cancer Family     Prostate cancer Family     Other Family         cardiac disorder   :     Review of Systems:     General: Fever, chills, or night sweats: negative  Cardiac: Negative for chest pain  Pulmonary: Negative for shortness of breath  Gastrointestinal: Abdominal pain negative  Nausea, vomiting - negative  Genitourinary: See HPI above  Patient does not have hematuria  All other systems queried were negative  Physical Exam:   General: Patient is pleasant and in NAD  Awake and alert  /76 (BP Location: Right arm)   Pulse 68   Temp 98 3 °F (36 8 °C) (Oral)   Resp 18   Ht 5' 8" (1 727 m)   Wt 74 8 kg (165 lb)   SpO2 98%   BMI 25 09 kg/m²   Cardiac: Peripheral edema: negative  Pulmonary: Non-labored breathing  Abdomen: Soft, non-tender, non-distended  Genitourinary: Negative CVA tenderness, negative suprapubic tenderness  (Male): Penis normal, phallus normal, meatus patent  Testicles descended into scrotum bilaterally without masses nor tenderness  No inguinal hernias bilaterally  DELIA: poor sphincter tone with moderate amount of light brown stool in rectal vault  Prostate 35g, firm, small nodule at left base  Urine visualized and bedside urinal: yellow without clots      Labs:     Lab Results   Component Value Date    HGB 13 6 12/06/2018    HCT 41 4 12/06/2018    WBC 5 55 12/06/2018     12/06/2018   ]    Lab Results   Component Value Date     08/15/2017    K 4 5 12/06/2018     12/06/2018    CO2 29 12/06/2018    BUN 16 12/06/2018    CREATININE 1 00 12/06/2018    CALCIUM 9 3 12/06/2018   ]      ASSESSMENT/PLAN:     1) BPH with incomplete bladder emptying  - recommend the addition of tamsulosin 0 4 mg once daily to facilitate bladder emptying  Monitor for hypotension   - patient had some constipation prior to admission as well  Recommend stool softeners as needed to avoid constipation as this can affect bladder emptying   - continue Q shift postvoid residuals  If patient is not demonstrating adequate bladder emptying, can initiate urinary retention protocol  2) Acute cystitis without hematuria   - likely exacerbated in the setting of incomplete bladder emptying and uncontrolled diabetes  - currently receiving Rocephin  Urine cultures pending  3) Family history of prostate cancer  - prostate examination revealing 35g prostate, firm, small nodule present  - no indication for PSA currently while inpatient given his urinary infection that will result in falsely elevated levels  Patient will follow up with Urology outpatient for continued monitoring of bladder emptying and discussion of prostate cancer screening  Our office will reach out to the patient to coordinate follow-up  No further acute urologic care required at this time  Urology will sign off  Thank you for allowing me to participate in this patients care  Please do not hesitate to call with any additional questions      Robert Buchanan PA-C

## 2018-12-06 NOTE — SOCIAL WORK
Per PT pt should have supervision for navigating steps as well as walker  Discussed with pt at bedside- pt placed call to [facility] in CM presence  Zachery Silveira reports their neighbor can meet pt at the home at 3:15 PM with his walker to assist him inside  Transport arranged via [address] for 3 PM [facility]  RN # provided for  to call and RN informed pt will need to be downstairs by 3 PM  Pt now agreeable to [facility] and preference is [facility] - referral placed in [facility]  Discussed with RN and SLIM  CM will continue to follow

## 2018-12-06 NOTE — PLAN OF CARE
Problem: Potential for Falls  Goal: Patient will remain free of falls  INTERVENTIONS:  - Assess patient frequently for physical needs  -  Identify cognitive and physical deficits and behaviors that affect risk of falls  -  [place] fall precautions as indicated by assessment   - Educate patient/family on patient safety including physical limitations  - Instruct patient to call for assistance with activity based on assessment  - Modify environment to reduce risk of injury  - Consider OT/PT consult to assist with strengthening/mobility   Outcome: Progressing      Problem: Prexisting or High Potential for Compromised Skin Integrity  Goal: Skin integrity is maintained or improved  INTERVENTIONS:  - Identify patients at risk for skin breakdown  - Assess and monitor skin integrity  - Assess and monitor nutrition and hydration status  - Monitor labs (i e  albumin)  - Assess for incontinence   - Turn and reposition patient  - Assist with mobility/ambulation  - Relieve pressure over bony prominences  - Avoid friction and shearing  - Provide appropriate hygiene as needed including keeping skin clean and dry  - Evaluate need for skin moisturizer/barrier cream  - Collaborate with interdisciplinary team (i e  Nutrition, Rehabilitation, etc )   - Patient/family teaching   Outcome: Progressing      Problem: Nutrition/Hydration-ADULT  Goal: Nutrient/Hydration intake appropriate for improving, restoring or maintaining nutritional needs  Monitor and assess patient's nutrition/hydration status for malnutrition (ex- brittle hair, bruises, dry skin, pale skin and conjunctiva, muscle wasting, smooth red tongue, and disorientation)  Collaborate with interdisciplinary team and initiate plan and interventions as ordered  Monitor patient's weight and dietary intake as ordered or per policy  Utilize nutrition screening tool and intervene per policy    INTERVENTIONS:  - Monitor oral intake, urinary output, labs, and treatment plans  - Assess nutrition and hydration status and recommend course of action  - Evaluate amount of meals eaten  - Assist patient with eating if necessary   - Allow adequate time for meals  - Recommend/ encourage appropriate diets, oral nutritional supplements, and vitamin/mineral supplements  - Order, calculate, and assess calorie counts as needed  - Assess need for intravenous fluids  - Provide no concentrated sweets/CHO  nutrition/hydration education as appropriate  - Include patient/family/caregiver in decisions related to nutrition   Outcome: Progressing

## 2018-12-06 NOTE — DISCHARGE SUMMARY
Discharge Summary - [address] [facility]    Patient Information: Brenda Barriga 80 y o  male MRN: [id number]  Unit/Bed#: -01 Encounter: [id number]    Discharging Physician / Practitioner: Annamarie Jaimes MD  PCP: Fabrizio Ryan MD  Admission Date: 12/4/2018  Discharge Date: 12/06/18    Disposition:     Home    Reason for Admission: Burning urination, dizziness    Discharge Diagnoses:     Principal Problem:    UTI (urinary tract infection)  Active Problems:    CAD (coronary artery disease)    Hyperlipidemia    Hypothyroidism    Diabetes mellitus, type 2 ([address] )    Benign prostatic hyperplasia    Vertigo  Resolved Problems:    * No resolved hospital problems  *      Consultations During Hospital Stay:  · urology    Procedures Performed:     · none    Significant Findings / Test Results:     ·     Incidental Findings:   ·      Test Results Pending at Discharge (will require follow up):   ·      Outpatient Tests Requested:  ·     Complications:  None    History of Present Illness:     Brenda Barriga is a 80 y o  male who presents with dizziness and urinary frequency on set last 24-48 hours  Patient reports baseline difficulty passing his urine  States in the last 24 hours and he has been dizzy with burning and significant frequency to his urination  Hospital Course:     Brenda Barriga is a 80 y o  male patient who originally presented to the hospital on 12/4/2018 due to complaints of burning on urination  He was found to have a UTI and placed on IV abx  Patient also had dizziness which he was placed on meclizine prn  His dizziness resolved  Urology also evaluated patient for his difficulty urinating and he was placed on flomax  Patient is currently hemodynamically stable and will be discharged home with PO abx and flomax  Patient to followup with urology as outpatient  Condition at Discharge: stable     Discharge Day Visit / Exam:     Subjective:  Patient seen and examined at bedside    He has no new complaints  Vitals: Blood Pressure: 148/78 (12/06/18 0755)  Pulse: 76 (12/06/18 0755)  Temperature: 97 6 °F (36 4 °C) (12/06/18 0755)  Temp Source: Oral (12/06/18 0755)  Respirations: 20 (12/06/18 0755)  Height: 5' 8" (172 7 cm) (12/05/18 1416)  Weight - Scale: 74 8 kg (165 lb) (12/04/18 1259)  SpO2: 94 % (12/06/18 0755)  Exam:   Physical Exam   Constitutional: He is oriented to person, place, and time  He appears well-developed and well-nourished  HENT:   Head: Normocephalic and atraumatic  Eyes: Pupils are equal, round, and reactive to light  Conjunctivae and EOM are normal    Neck: Normal range of motion  Neck supple  No JVD present  No tracheal deviation present  No thyromegaly present  Cardiovascular: Normal rate, regular rhythm and normal heart sounds  Exam reveals no gallop and no friction rub  No murmur heard  Pulmonary/Chest: Effort normal and breath sounds normal  No respiratory distress  He has no wheezes  He has no rales  Abdominal: Soft  Bowel sounds are normal  He exhibits no distension  There is no tenderness  There is no rebound  Musculoskeletal: Normal range of motion  He exhibits no edema  Neurological: He is alert and oriented to person, place, and time  Skin: Skin is warm and dry  No rash noted  No erythema  No pallor  Vitals reviewed  Discussion with Family:     Discharge instructions/Information to patient and family:   See after visit summary for information provided to patient and family  Provisions for Follow-Up Care:  See after visit summary for information related to follow-up care and any pertinent home health orders  Planned Readmission: none     Discharge Statement:  I spent 50 minutes discharging the patient  This time was spent on the day of discharge  I had direct contact with the patient on the day of discharge   Greater than 50% of the total time was spent examining patient, answering all patient questions, arranging and discussing plan of care with patient as well as directly providing post-discharge instructions  Additional time then spent on discharge activities  Discharge Medications:  See after visit summary for reconciled discharge medications provided to patient and family        ** Please Note: This note has been constructed using a voice recognition system **

## 2018-12-06 NOTE — PLAN OF CARE
Problem: PHYSICAL THERAPY ADULT  Goal: Performs mobility at highest level of function for planned discharge setting  See evaluation for individualized goals  Treatment/Interventions: Functional transfer training, LE strengthening/ROM, Elevations, Therapeutic exercise, Endurance training, Patient/family training, Equipment eval/education, Bed mobility, Gait training  Equipment Recommended: Jessica Agosto       See flowsheet documentation for full assessment, interventions and recommendations  Outcome: Progressing  Prognosis: Good  Problem List: Impaired balance, Decreased mobility  Assessment: Pt seen for PT treatment session this date with interventions consisting of gait training w/ emphasis on improving pt's ability to ambulate level surfaces x 30 ft x 2 + 10 ft with supervision provided by therapist with RW, Therapeutic exercise consisting of: AROM 10 reps B LE in sitting position and therapeutic activity consisting of training: bed mobility, supine<>sit transfers and sit<>stand transfers  Pt agreeable to PT treatment session upon arrival, pt found supine in bed w/ HOB elevated, in no apparent distress, A&O x 4 and responsive  In comparison to previous session, pt with improvements in level surface ambulation and functional transfers with improving independence and initiation of stair training  Post session: pt returned back to recliner, chair alarm engaged, all needs in reach and RN notified of session findings/recommendations  Continue to recommend Home PT with family support at time of d/c pending friend/family available to provide supervision for enhanced patient safety in order to maximize pt's functional independence and safety w/ mobility  If supervision is not able to be provided at time of discharge, recommend STR  Pt continues to be functioning below baseline level, and remains limited 2* factors listed above   PT will continue to see pt while here in order to address the deficits listed above and provide interventions consistent w/ POC in effort to achieve STGs  Barriers to Discharge: Other (Comment), None     Recommendation: Home PT, Home with family support     PT - OK to Discharge: Yes (when medically cleared)    See flowsheet documentation for full assessment

## 2018-12-06 NOTE — SOCIAL WORK
LOS 1  LACE 63  Patient is not a 30 day readmission  Cm met with pt at bedside regarding DCP  Pt reports he is currently living in [place] with a friend Hugo Sandoval in a one story home with 3 RAGHAVEN[address]A  He reports he has a cane, quad cane and walker at home  He has had prior VNA but is unsure of the agency and no hx of STR  He reports he typically drives himself to appts or he has a neighbor who assists when he is unable to drive  CM discussed VNA at d/c - pt reports he is not interested at this time  Pt reports he does not have transportation as a neighbor was going to be available but now cannot take him  Pt is agreeable to [facility] - call placed to [facility] and [facility]  not available this week  CM spoke with pt who agrees to [address]  Waiver signed - copy sent to [facility] and placed in medical records for scanning  PT to see pt around 1 PM to clear stairs  [facility]FT to be arranged after PT clearance  Discussed [employer] [employer] services for ongoing transportation - copy of medicare card made and card returned to pt with PP application  CM will continue to follow  CM reviewed discharge planning process including the following: identifying caregivers at home, preference for d/c planning needs, availability of treatment team to discuss questions or concerns patient and/or family may have regarding diagnosis, plan of care, old or new medications and discharge planning  Discharge Checklist reviewed and CM will continue to monitor for progress toward discharge goals in nursing and provider rounds

## 2018-12-06 NOTE — NURSING NOTE
10 Units Humalog given as ordered for hyperglycemia, blood sugar rechecked prior to d/c 376 , no s/s of hyperglycemia noted  Pt d/c home

## 2018-12-06 NOTE — TELEPHONE ENCOUNTER
Patient seen at [facility] in consultation for BPH, incomplete bladder emptying, and acute cystitis  Noted to have a small nodule on prostate examination as well  Patient will need an outpatient follow-up in approximately 6 weeks for postvoid residual and symptom reassessment  Please contact the patient once discharged to coordinate  Thank you

## 2018-12-06 NOTE — PHYSICAL THERAPY NOTE
Physical Therapy Treatment Note       12/06/18 1303   Pain Assessment   Pain Assessment No/denies pain   Pain Score No Pain   Restrictions/Precautions   Weight Bearing Precautions Per Order No   Braces or Orthoses Other (Comment)  (none per PT IE)   Other Precautions Fall Risk;Bed Alarm; Chair Alarm   General   Chart Reviewed Yes   Response to Previous Treatment Patient with no complaints from previous session  Family/Caregiver Present No   Cognition   Overall Cognitive Status WFL   Arousal/Participation Alert; Responsive; Cooperative   Attention Within functional limits   Orientation Level Oriented X4   Memory Within functional limits   Following Commands Follows multistep commands with increased time or repetition   Comments Pt agreeable to PT treatment   Subjective   Subjective "Jennifer White told me to send Meals on Wheels to her house "   Bed Mobility   Rolling R 6  Modified independent   Additional items Increased time required;HOB elevated; Bedrails   Supine to Sit 6  Modified independent   Additional items Increased time required; Bedrails;HOB elevated   Transfers   Sit to Stand 5  Supervision   Additional items Assist x 1; Increased time required;Verbal cues   Stand to Sit 6  Modified independent   Additional items Increased time required;Armrests   Toilet transfer (SBA-CGA)   Additional items Assist x 1; Increased time required;Standard toilet;Verbal cues; Other  (grab bar)   Ambulation/Elevation   Gait pattern Decreased foot clearance; Inconsistent fouzia; Short stride   Gait Assistance 5  Supervision   Additional items Assist x 1;Verbal cues   Assistive Device Rolling walker   Distance 30 ft x 2 + 10 ft   Stair Management Assistance 5  Supervision   Additional items Assist x 1;Verbal cues; Increased time required   Stair Management Technique One rail L;Step to pattern; Sideways   Number of Stairs 3   Balance   Static Sitting Good   Dynamic Sitting Good   Static Standing Fair +   Dynamic Standing Fair   Ambulatory Fair - Endurance Deficit   Endurance Deficit No   Activity Tolerance   Activity Tolerance Patient tolerated treatment well   Nurse Made Aware yes, Colonel Rodriguez, RN verbalized pt appropriate for PT treatment; present during stair training   Exercises   Knee AROM Long Arc Quad Sitting;10 reps;AROM; Bilateral   Ankle Pumps Sitting;10 reps;AROM; Bilateral   Marching Sitting;10 reps;AROM; Bilateral   Assessment   Prognosis Good   Problem List Impaired balance;Decreased mobility   Assessment Pt seen for PT treatment session this date with interventions consisting of gait training w/ emphasis on improving pt's ability to ambulate level surfaces x 30 ft x 2 + 10 ft with supervision provided by therapist with RW, Therapeutic exercise consisting of: AROM 10 reps B LE in sitting position and therapeutic activity consisting of training: bed mobility, supine<>sit transfers and sit<>stand transfers  Pt agreeable to PT treatment session upon arrival, pt found supine in bed w/ HOB elevated, in no apparent distress, A&O x 4 and responsive  In comparison to previous session, pt with improvements in level surface ambulation and functional transfers with improving independence and initiation of stair training  Post session: pt returned back to recliner, chair alarm engaged, all needs in reach and RN notified of session findings/recommendations  Continue to recommend Home PT with family support at time of d/c pending friend/family available to provide supervision for enhanced patient safety in order to maximize pt's functional independence and safety w/ mobility  If supervision is not able to be provided at time of discharge, recommend STR  Pt continues to be functioning below baseline level, and remains limited 2* factors listed above  PT will continue to see pt while here in order to address the deficits listed above and provide interventions consistent w/ POC in effort to achieve STGs  Barriers to Discharge Other (Comment); None  (pt able to navigate elevations with supervision; recommend Home PT with friend/family support at time of d/c pending friend/family available to provide supervision for enhanced patient safety on stairs in order to maximize pt's functional independence and safety w/ mobility  If supervision is not able to be provided at time of discharge, recommend STR   Goals   Patient Goals "I want to go home"   STG Expiration Date 12/16/18   Short Term Goal #1 STGs remain appropriate   Treatment Day 1   Plan   Treatment/Interventions Functional transfer training;LE strengthening/ROM; Elevations; Therapeutic exercise; Endurance training;Patient/family training;Equipment eval/education; Bed mobility;Gait training;Spoke to nursing;Spoke to case management  (spoke with Barbara Recio CM regarding patient outcomes and recommendations)   Progress Progressing toward goals   PT Frequency Other (Comment)  (3-5x/wk)   Recommendation   Recommendation Home PT; Home with family support  (recommend Home PT with friend/family support at time of d/c pending friend/family available to provide supervision for enhanced patient safety on stairs in order to maximize pt's functional independence and safety w/ mobility   If supervision is not able to be provided at time of discharge, recommend STR)   Equipment Recommended Walker   PT - OK to Discharge Yes  (when medically cleared; home with friend/family support and home PT)       Breanne Palmer, PT, DPT    Time of PT treatment session: 13:03-13:30  27 minutes

## 2018-12-06 NOTE — PHYSICIAN ADVISOR
Current patient class: Inpatient  The patient is currently on Hospital Day: 3 at [address]      The patient was admitted to the hospital at [phone] on 12/5/18 for the following diagnosis:  Vertigo [R42]  UTI (urinary tract infection) [N39 0]  Frequent urination [R35 0]  Hyperglycemia [R73 9]       There is documentation in the medical record of an expected length of stay of at least 2 midnights  The patient is therefore expected to satisfy the 2 midnight benchmark and given the 2 midnight presumption is appropriate for INPATIENT ADMISSION  Given this expectation of a satisfying stay, CMS instructs us that the patient is most often appropriate for inpatient admission under part A provided medical necessity is documented in the chart  After review of the relevant documentation, labs, vital signs and test results, the patient is appropriate for INPATIENT ADMISSION  Admission to the hospital as an inpatient is a complex decision making process which requires the practitioner to consider the patients presenting complaint, history and physical examination and all relevant testing  With this in mind, in this case, the patient was deemed appropriate for INPATIENT ADMISSION  After review of the documentation and testing available at the time of the admission I concur with this clinical determination of medical necessity  Rationale is as follows: The patient is an 27-year-old male who presented to the hospital on December 4, 2015 with dizziness and urinary frequency  The plan was for observation anticipating a less than two midnights stay  The primary diagnosis was vertigo, he had unsteadiness  He was diagnosed with urinary tract infection  The following day the patient was re-evaluated and changed to inpatient level care by the primary team   He remained on intravenous ceftriaxone for urinary tract infection and Urology was consulted given his urinary retention    The patient ultimately stabilized for discharge on December 6th  It was appropriate to change status to inpatient level care if it was felt that the patient required ongoing hospitalization for specialty consultation as well as continued intravenous antibiotics  Urine culture results are pending      The patients vitals on arrival were ED Triage Vitals   Temperature Pulse Respirations Blood Pressure SpO2   12/04/18 1259 12/04/18 1259 12/04/18 1259 12/04/18 1259 12/04/18 1259   (!) 97 3 °F (36 3 °C) 82 16 121/64 95 %      Temp Source Heart Rate Source Patient Position - Orthostatic VS BP Location FiO2 (%)   12/04/18 1923 12/04/18 1505 12/04/18 1505 12/04/18 1259 --   Oral Monitor Lying Left arm       Pain Score       12/04/18 1306       No Pain           Past Medical History:   Diagnosis Date    BRBPR (bright red blood per rectum)     Last Assessed: 1/11/2016    Chest tightness or pressure     Last Assessed: 3/27/2014    Cholecystitis     Last Assessed: 9/1/2015    Lyme disease     Shortness of breath     Last Assessed: 3/27/2014    Vertigo     Last Assessed: 12/31/2015     Past Surgical History:   Procedure Laterality Date    CATARACT EXTRACTION      Last Assessed: 11/11/2016    HERNIA REPAIR      LAPAROSCOPIC CHOLECYSTECTOMY      TONSILLECTOMY             Consults have been placed to:   IP CONSULT TO UROLOGY    Vitals:    12/05/18 1416 12/05/18 1548 12/05/18 2348 12/06/18 0755   BP:  121/61 156/76 148/78   BP Location:  Right arm Right arm Right arm   Pulse:  72 68 76   Resp:  18 18 20   Temp:  98 1 °F (36 7 °C) 98 3 °F (36 8 °C) 97 6 °F (36 4 °C)   TempSrc:  Oral Oral Oral   SpO2:  91% 98% 94%   Weight:       Height: 5' 8" (1 727 m)          Most recent labs:    Recent Labs      12/04/18   1355   12/06/18   0446   WBC  5 60   < >  5 55   HGB  13 0   < >  13 6   HCT  39 7   < >  41 4   PLT  199   < >  199   K  4 8   < >  4 5   CALCIUM  9 2   < >  9 3   BUN  23   < >  16   CREATININE  1 13   < >  1 00   INR  1 17 --    --    TROPONINI  <0 02   --    --    AST  20   --    --    ALT  26   --    --    ALKPHOS  74   --    --     < > = values in this interval not displayed         Scheduled Meds:  Current Facility-Administered Medications:  acetaminophen 650 mg Oral Q6H PRN Gisela Silva MD    aspirin 81 mg Oral Daily Gisela Silva MD    atorvastatin 10 mg Oral Daily Gisela Silva MD    cefTRIAXone 1,000 mg Intravenous Q24H Gisela Silva MD Last Rate: 1,000 mg (12/06/18 8799)   cyanocobalamin 1,000 mcg Oral Daily Gisela Silva MD    glipiZIDE 10 mg Oral Daily Before Breakfast Gisela Silva MD    heparin (porcine) 5,000 Units Subcutaneous Q8H [facility] Gisela Silva MD    levothyroxine 125 mcg Oral Daily Gisela Silva MD    meclizine 12 5 mg Oral Q8H PRN Blayne Merritt MD    metFORMIN 500 mg Oral BID With Meals Gisela Silva MD    metoprolol succinate 12 5 mg Oral Daily Gisela Silva MD    nateglinide 120 mg Oral TID AC Gisela Silva MD    nystatin  Topical BID Gisela Silva MD    ondansetron 4 mg Intravenous Q6H PRN Gisela Silva MD    pantoprazole 40 mg Oral Early Morning Gisela Silva MD    pioglitazone 45 mg Oral Daily Gisela Silva MD    polyethylene glycol 17 g Oral Daily Gisela Silva MD    tamsulosin 0 4 mg Oral Daily With Dinner Carmina Fitzgerald PA-C      Continuous Infusions:   PRN Meds:   acetaminophen    meclizine    ondansetron    Surgical procedures (if appropriate):

## 2018-12-06 NOTE — PLAN OF CARE
Problem: DISCHARGE PLANNING - CARE MANAGEMENT  Goal: Discharge to post-acute care or home with appropriate resources  INTERVENTIONS:  - Conduct assessment to determine patient/family and health care team treatment goals, and need for post-acute services based on payer coverage, community resources, and patient preferences, and barriers to discharge  - Address psychosocial, clinical, and financial barriers to discharge as identified in assessment in conjunction with the patient/family and health care team  - Arrange appropriate level of post-acute services according to patients   needs and preference and payer coverage in collaboration with the physician and health care team  - Communicate with and update the patient/family, physician, and health care team regarding progress on the discharge plan  - Arrange appropriate transportation to post-acute venues  Outcome: Progressing  LOS 1  LACE 63  Patient is not a 30 day readmission  Cm met with pt at bedside regarding DCP  Pt reports he is currently living in [facility] with a friend [place] in a one story home with 3 [address]  He reports he has a cane, quad cane and walker at home  He has had prior VNA but is unsure of the agency and no hx of STR  He reports he typically drives himself to appts or he has a neighbor who assists when he is unable to drive  CM discussed VNA at d/c - pt reports he is not interested at this time  Pt reports he does not have transportation as a neighbor was going to be available but now cannot take him  Pt is agreeable to [facility] - call placed to [facility] and Ankita Medina not available this week  CM spoke with pt who agrees to [address]  Waiver signed - copy sent to [facility] and placed in medical records for scanning  PT to see pt around 1 PM to clear stairs  LYFT to be arranged after PT clearance  CM will continue to follow      CM reviewed discharge planning process including the following: identifying caregivers at home, preference for d/c planning needs, availability of treatment team to discuss questions or concerns patient and/or family may have regarding diagnosis, plan of care, old or new medications and discharge planning  Discharge Checklist reviewed and CM will continue to monitor for progress toward discharge goals in nursing and provider rounds

## 2018-12-07 ENCOUNTER — TRANSITIONAL CARE MANAGEMENT (OUTPATIENT)
Dept: FAMILY MEDICINE CLINIC | Facility: CLINIC | Age: 83
End: 2018-12-07

## 2018-12-07 LAB — GLUCOSE SERPL-MCNC: 385 MG/DL (ref 65–140)

## 2018-12-10 NOTE — TELEPHONE ENCOUNTER
Tried to call again but had the same problem with the numbers  Mailed new pt paperwork and apt card

## 2018-12-12 ENCOUNTER — TELEPHONE (OUTPATIENT)
Dept: FAMILY MEDICINE CLINIC | Facility: CLINIC | Age: 83
End: 2018-12-12

## 2018-12-12 NOTE — TELEPHONE ENCOUNTER
[address] called to let you know that she evaluated pt for OT and he does not needs her services  Thank you

## 2018-12-13 ENCOUNTER — OFFICE VISIT (OUTPATIENT)
Dept: CARDIOLOGY CLINIC | Facility: CLINIC | Age: 83
End: 2018-12-13
Payer: MEDICARE

## 2018-12-13 VITALS
HEIGHT: 68 IN | DIASTOLIC BLOOD PRESSURE: 76 MMHG | BODY MASS INDEX: 23.34 KG/M2 | HEART RATE: 86 BPM | OXYGEN SATURATION: 98 % | WEIGHT: 154 LBS | SYSTOLIC BLOOD PRESSURE: 134 MMHG

## 2018-12-13 DIAGNOSIS — E78.2 MIXED HYPERLIPIDEMIA: ICD-10-CM

## 2018-12-13 DIAGNOSIS — R94.39 ABNORMAL STRESS TEST: Primary | ICD-10-CM

## 2018-12-13 PROCEDURE — 99214 OFFICE O/P EST MOD 30 MIN: CPT | Performed by: INTERNAL MEDICINE

## 2018-12-13 NOTE — PROGRESS NOTES
CARDIOLOGY OFFICE VISIT  [facility]  [address], [address] [place] [address], [place], [facility] Doris Alan  Tel: [phone]      NAME: Waylon Lerner  AGE: 80 y o  SEX: male  : 1930   MRN: [id number]      Chief Complaint:  Chief Complaint   Patient presents with    Follow-up     hospital follow-up Vertigo  Had EKG         History of Present Illness:   Patient comes for follow up to recent hospitalization at [facility] for UTI, vertigo  States he is doing much better now  Denies chest pain / pressure, SOB, palpitations, lightheadedness, syncope, swelling feet, orthopnea  Presumed CAD -  States is doing well cardiac wise with no cardiac symptoms  Taking all medications regularly  Has not used SL NTG since the last clinic visit  HLP -  Has had hyperlipidemia for many years  Taking statin regularly along with diet control  Denies myalgia  PCP closely monitoring the blood work  Past Medical History:  Past Medical History:   Diagnosis Date    BRBPR (bright red blood per rectum)     Last Assessed: 2016    Chest tightness or pressure     Last Assessed: 3/27/2014    Cholecystitis     Last Assessed: 2015    Lyme disease     Shortness of breath     Last Assessed: 3/27/2014    Vertigo     Last Assessed: 2015         Past Surgical History:  Past Surgical History:   Procedure Laterality Date    CATARACT EXTRACTION      Last Assessed: 2016    HERNIA REPAIR      LAPAROSCOPIC CHOLECYSTECTOMY      TONSILLECTOMY           Family History:  Family History   Problem Relation Age of Onset    No Known Problems Mother     Bipolar disorder Family         II    Tongue cancer Family     Prostate cancer Family     Other Family         cardiac disorder         Social History:  Social History     Social History    Marital status:       Spouse name: N/A    Number of children: N/A    Years of education: N/A     Social History Main Topics    Smoking status: Never Smoker    Smokeless tobacco: Never Used    Alcohol use No    Drug use: No    Sexual activity: Not on file     Other Topics Concern    Not on file     Social History Narrative    No narrative on file         Active Problems:  Patient Active Problem List   Diagnosis    CAD (coronary artery disease)    Hyperlipidemia    Hypothyroidism    Diabetes mellitus, type 2 (HCC)    Chronic cough    Acid reflux disease    Benign prostatic hyperplasia    DDD (degenerative disc disease), lumbar    Diverticulosis    UTI (urinary tract infection)    Vertigo         The following portions of the patient's history were reviewed and updated as appropriate: past medical history, past surgical history, past family history,  past social history, current medications, allergies and problem list       Review of Systems:  Constitutional: Denies fever, chills, fatigue  Eyes: Denies eye redness, eye discharge, double vision  ENT: Denies hearing loss, tinnitus, sneezing, nasal discharge, sore throat   Respiratory: Denies cough, expectoration, hemoptysis, shortness of breath  Cardiovascular: Denies chest pain, palpitations, orthopnea, PND, lower extremity swelling  Gastrointestinal: Denies abdominal pain, nausea, vomiting, hematemesis, diarrhea, bloody stools  Genito-Urinary: Denies dysuria, incontinence  Musculoskeletal: Denies back pain, joint pain, muscle pain  Neurologic: Denies confusion, lightheadedness, syncope, headache, focal weakness, sensory changes, seizures  Endocrine: Denies polyuria, polydipsia, temperature intolerance  Allergy and Immunology: Denies hives, insect bite sensitivity  Hematological and Lymphatic: Denies bleeding problems, swollen glands   Psychological: Denies depression, suicidal ideation, anxiety, panic  Dermatological: Denies pruritus, rash, skin lesion changes      Vitals:  Vitals:    12/13/18 1458   BP: 134/76   Pulse: 86   SpO2: 98%       Body mass index is 23 42 kg/m²  Weight (last 2 days)     Date/Time   Weight    12/13/18 1458  69 9 (154)                Physical Examination:  General: Patient is not in acute distress  Awake, alert, oriented in time, place and person  Responding to commands  Head: Normocephalic  Atraumatic  Eyes: Both pupils normal sized, round and reactive to light  Nonicteric  ENT: Normal external ear canals  Nares normal, no drainage  Lips and oral mucosa normal  Neck: Supple  JVP not raised  Trachea central  No thyromegaly  Lungs: Bilateral bronchovascular breath sounds with no crackles or rhonchi  Chest wall: No tenderness  Cardiovascular: RRR  S1 and S2 normal  No murmur, rub or gallop  Gastrointestinal: Abdomen soft, nontender  No guarding or rigidity  Liver and spleen not palpable  Bowel sounds present  Neurologic: Patient is awake, alert, oriented in time, place and person  Responding to command  Moving all extremities  Integumentary:  No skin rash  Lymphatic: No cervical lymphadenopathy  Back: Symmetric   No CVA tenderness  Extremities: No clubbing, cyanosis or edema      Laboratory Results:  CBC with diff:   Lab Results   Component Value Date    WBC 5 55 12/06/2018    WBC 6 1 05/11/2016    RBC 4 43 12/06/2018    RBC 4 34 05/11/2016    HGB 13 6 12/06/2018    HGB 12 5 (L) 05/11/2016    HCT 41 4 12/06/2018    HCT 39 1 05/11/2016    MCV 94 12/06/2018    MCV 90 0 05/11/2016    MCH 30 7 12/06/2018    MCH 28 8 05/11/2016    RDW 12 8 12/06/2018    RDW 15 3 (H) 05/11/2016     12/06/2018     05/11/2016       CMP:  Lab Results   Component Value Date    CREATININE 1 00 12/06/2018    CREATININE 1 00 08/15/2017    BUN 16 12/06/2018    BUN 16 05/15/2018     08/15/2017    K 4 5 12/06/2018    K 4 7 05/15/2018     12/06/2018     05/15/2018    CO2 29 12/06/2018    CO2 27 05/15/2018    PROT 7 6 08/15/2017    ALKPHOS 74 12/04/2018    ALKPHOS 61 05/15/2018    ALT 26 12/04/2018    ALT 37 08/15/2017    AST 20 12/04/2018    AST 33 08/15/2017       Lab Results   Component Value Date    HGBA1C 11 3 (H) 12/04/2018    HGBA1C 10 0 (H) 05/15/2018       Lab Results   Component Value Date    TROPONINI <0 02 12/04/2018       Lipid Profile:   Lab Results   Component Value Date    CHOL 155 08/15/2017    CHOL 189 05/15/2017    CHOL 187 09/09/2016     Lab Results   Component Value Date    HDL 36 (L) 05/15/2018    HDL 41 08/15/2017    HDL 37 (L) 05/15/2017     No results found for: Dorcas Rabago  Lab Results   Component Value Date    TRIG 162 (H) 05/15/2018    TRIG 131 08/15/2017    TRIG 170 (H) 05/15/2017         Medications:    Current Outpatient Prescriptions:     aspirin (ASPIRIN LOW DOSE) 81 MG tablet, Take 1 tablet by mouth daily, Disp: , Rfl:     atorvastatin (LIPITOR) 10 mg tablet, TAKE 1 TABLET DAILY  , Disp: 30 tablet, Rfl: 8    cyanocobalamin (VITAMIN B-12) 1,000 mcg tablet, Take by mouth, Disp: , Rfl:     glipiZIDE (GLUCOTROL) 10 mg tablet, Take 2 tablets (20 mg total) by mouth 2 (two) times a day, Disp: 60 tablet, Rfl: 5    glucose blood (ONE TOUCH ULTRA TEST) test strip, by In Vitro route, Disp: , Rfl:     glucose blood test strip, , Disp: , Rfl:     levothyroxine 125 mcg tablet, Take 1 tablet by mouth daily, Disp: , Rfl:     meclizine (ANTIVERT) 12 5 MG tablet, Take 1 tablet (12 5 mg total) by mouth every 8 (eight) hours as needed for dizziness, Disp: 30 tablet, Rfl: 0    metFORMIN (GLUCOPHAGE-XR) 500 mg 24 hr tablet, Take 1 tablet (500 mg total) by mouth 2 (two) times a day with meals, Disp: 180 tablet, Rfl: 3    metoprolol succinate (TOPROL-XL) 25 mg 24 hr tablet, Take 0 5 tablets by mouth daily, Disp: , Rfl:     nateglinide (STARLIX) 120 mg tablet, TAKE 1 TABLET THREE TIMES A DAY BEFORE MEALS, Disp: 270 tablet, Rfl: 3    nitroglycerin (NITROSTAT) 0 4 mg SL tablet, Place 1 tablet under the tongue every 5 (five) minutes as needed, Disp: , Rfl:     omeprazole (PRILOSEC) 20 mg delayed release capsule, Take 1 capsule (20 mg total) by mouth daily, Disp: 180 capsule, Rfl: 0    ONE TOUCH ULTRA TEST test strip, CHECK BLOOD SUGAR DAILY *DX: E11 9, Disp: 100 each, Rfl: 3    pioglitazone (ACTOS) 45 mg tablet, Take 1 tablet by mouth daily, Disp: , Rfl:     polyethylene glycol (MIRALAX) 17 g packet, , Disp: , Rfl:     potassium-sodium phosphateS (PHOSPHA 250 NEUTRAL) 155-852-130 mg tablet, , Disp: , Rfl:     tamsulosin (FLOMAX) 0 4 mg, Take 1 capsule (0 4 mg total) by mouth daily with dinner for 30 days, Disp: 30 capsule, Rfl: 0      Allergies:  No Known Allergies      Assessment and Plan:  1  Abnormal stress test - Presumed CAD  Pt's pharmacologic stress test had shown both a fixed and a small reversible defect  I had discussed the option of cardiac catheterization (to confirm the diagnosis + possible PCI) versus medical therapy  This risks, benefits, alternatives of both therapies were discussed  Patient chose the medical therapy, saying that he would try medications first and if they did not work, he would reconsider getting cardiac catheterization  At this age he preferred medications over procedures  Pt to continue his aspirin, Metoprolol succinate, Statin, SL NTG    2  Mixed hyperlipidemia  Continue statin and diet control  His PCP closely monitors his blood work    Recommend aggressive risk factor modification and therapeutic lifestyle changes  Low-salt, low-calorie, low-fat, low-cholesterol diet and to optimize weight  I will defer the ordering and monitoring of necessity lab studies to you, but I am available and happy to review and manage any of the data at your request in the future  Discussed concepts of atherosclerosis, including signs and symptoms of cardiac disease  Previous studies were reviewed  Safety measures were reviewed  Questions were entertained and answered  Patient was advised to report any problems requiring medical attention  Follow-up with PCP and appropriate specialist and lab work as discussed  Return for follow up visit as scheduled or earlier, if needed  Thank you for allowing me to participate in the care and evaluation of your patient  Should you have any questions, please feel free to contact me        Sia Tay MD  12/13/2018,3:18 PM

## 2018-12-31 DIAGNOSIS — E03.9 HYPOTHYROIDISM, UNSPECIFIED TYPE: Primary | ICD-10-CM

## 2018-12-31 RX ORDER — LEVOTHYROXINE SODIUM 0.12 MG/1
125 TABLET ORAL DAILY
Qty: 90 TABLET | Refills: 3 | Status: SHIPPED | OUTPATIENT
Start: 2018-12-31 | End: 2019-02-19 | Stop reason: SDUPTHER

## 2019-01-02 ENCOUNTER — TELEPHONE (OUTPATIENT)
Dept: FAMILY MEDICINE CLINIC | Facility: CLINIC | Age: 84
End: 2019-01-02

## 2019-01-02 ENCOUNTER — APPOINTMENT (EMERGENCY)
Dept: RADIOLOGY | Facility: HOSPITAL | Age: 84
DRG: 149 | End: 2019-01-02
Payer: MEDICARE

## 2019-01-02 ENCOUNTER — APPOINTMENT (EMERGENCY)
Dept: CT IMAGING | Facility: HOSPITAL | Age: 84
DRG: 149 | End: 2019-01-02
Payer: MEDICARE

## 2019-01-02 ENCOUNTER — HOSPITAL ENCOUNTER (INPATIENT)
Facility: HOSPITAL | Age: 84
LOS: 3 days | Discharge: NURSING, REHAB OR LONG-TERM CARE | DRG: 149 | End: 2019-01-05
Attending: EMERGENCY MEDICINE | Admitting: INTERNAL MEDICINE
Payer: MEDICARE

## 2019-01-02 DIAGNOSIS — R42 DIZZINESS: Primary | ICD-10-CM

## 2019-01-02 DIAGNOSIS — E11.9 DIABETES MELLITUS, TYPE 2 (HCC): ICD-10-CM

## 2019-01-02 LAB
ALBUMIN SERPL BCP-MCNC: 2.9 G/DL (ref 3.5–5)
ALP SERPL-CCNC: 73 U/L (ref 46–116)
ALT SERPL W P-5'-P-CCNC: 22 U/L (ref 12–78)
ANION GAP SERPL CALCULATED.3IONS-SCNC: 8 MMOL/L (ref 4–13)
AST SERPL W P-5'-P-CCNC: 19 U/L (ref 5–45)
ATRIAL RATE: 78 BPM
BASOPHILS # BLD AUTO: 0.06 THOUSANDS/ΜL (ref 0–0.1)
BASOPHILS NFR BLD AUTO: 1 % (ref 0–1)
BILIRUB DIRECT SERPL-MCNC: 0.08 MG/DL (ref 0–0.2)
BILIRUB SERPL-MCNC: 0.3 MG/DL (ref 0.2–1)
BILIRUB UR QL STRIP: NEGATIVE
BUN SERPL-MCNC: 19 MG/DL (ref 5–25)
CALCIUM SERPL-MCNC: 9.6 MG/DL (ref 8.3–10.1)
CHLORIDE SERPL-SCNC: 104 MMOL/L (ref 100–108)
CLARITY UR: CLEAR
CO2 SERPL-SCNC: 28 MMOL/L (ref 21–32)
COLOR UR: YELLOW
CREAT SERPL-MCNC: 0.95 MG/DL (ref 0.6–1.3)
EOSINOPHIL # BLD AUTO: 0.32 THOUSAND/ΜL (ref 0–0.61)
EOSINOPHIL NFR BLD AUTO: 4 % (ref 0–6)
ERYTHROCYTE [DISTWIDTH] IN BLOOD BY AUTOMATED COUNT: 12.7 % (ref 11.6–15.1)
GFR SERPL CREATININE-BSD FRML MDRD: 71 ML/MIN/1.73SQ M
GLUCOSE SERPL-MCNC: 160 MG/DL (ref 65–140)
GLUCOSE SERPL-MCNC: 162 MG/DL (ref 65–140)
GLUCOSE SERPL-MCNC: 232 MG/DL (ref 65–140)
GLUCOSE UR STRIP-MCNC: NEGATIVE MG/DL
HCT VFR BLD AUTO: 40.6 % (ref 36.5–49.3)
HGB BLD-MCNC: 13.1 G/DL (ref 12–17)
HGB UR QL STRIP.AUTO: NEGATIVE
IMM GRANULOCYTES # BLD AUTO: 0.09 THOUSAND/UL (ref 0–0.2)
IMM GRANULOCYTES NFR BLD AUTO: 1 % (ref 0–2)
INR PPP: 1 (ref 0.86–1.17)
KETONES UR STRIP-MCNC: ABNORMAL MG/DL
LEUKOCYTE ESTERASE UR QL STRIP: NEGATIVE
LYMPHOCYTES # BLD AUTO: 2.16 THOUSANDS/ΜL (ref 0.6–4.47)
LYMPHOCYTES NFR BLD AUTO: 25 % (ref 14–44)
MCH RBC QN AUTO: 30.1 PG (ref 26.8–34.3)
MCHC RBC AUTO-ENTMCNC: 32.3 G/DL (ref 31.4–37.4)
MCV RBC AUTO: 93 FL (ref 82–98)
MONOCYTES # BLD AUTO: 0.73 THOUSAND/ΜL (ref 0.17–1.22)
MONOCYTES NFR BLD AUTO: 9 % (ref 4–12)
NEUTROPHILS # BLD AUTO: 5.17 THOUSANDS/ΜL (ref 1.85–7.62)
NEUTS SEG NFR BLD AUTO: 60 % (ref 43–75)
NITRITE UR QL STRIP: NEGATIVE
NRBC BLD AUTO-RTO: 0 /100 WBCS
P AXIS: 60 DEGREES
PH UR STRIP.AUTO: 7 [PH] (ref 4.5–8)
PLATELET # BLD AUTO: 261 THOUSANDS/UL (ref 149–390)
PLATELET # BLD AUTO: 285 THOUSANDS/UL (ref 149–390)
PMV BLD AUTO: 9.3 FL (ref 8.9–12.7)
PMV BLD AUTO: 9.9 FL (ref 8.9–12.7)
POTASSIUM SERPL-SCNC: 4.3 MMOL/L (ref 3.5–5.3)
PR INTERVAL: 158 MS
PROT SERPL-MCNC: 7.8 G/DL (ref 6.4–8.2)
PROT UR STRIP-MCNC: NEGATIVE MG/DL
PROTHROMBIN TIME: 13.1 SECONDS (ref 11.8–14.2)
QRS AXIS: 255 DEGREES
QRSD INTERVAL: 138 MS
QT INTERVAL: 418 MS
QTC INTERVAL: 476 MS
RBC # BLD AUTO: 4.35 MILLION/UL (ref 3.88–5.62)
SODIUM SERPL-SCNC: 140 MMOL/L (ref 136–145)
SP GR UR STRIP.AUTO: 1.01 (ref 1–1.03)
T WAVE AXIS: 23 DEGREES
TROPONIN I SERPL-MCNC: <0.02 NG/ML
UROBILINOGEN UR QL STRIP.AUTO: 0.2 E.U./DL
VENTRICULAR RATE: 78 BPM
WBC # BLD AUTO: 8.53 THOUSAND/UL (ref 4.31–10.16)

## 2019-01-02 PROCEDURE — 84484 ASSAY OF TROPONIN QUANT: CPT | Performed by: EMERGENCY MEDICINE

## 2019-01-02 PROCEDURE — 93005 ELECTROCARDIOGRAM TRACING: CPT

## 2019-01-02 PROCEDURE — 71046 X-RAY EXAM CHEST 2 VIEWS: CPT

## 2019-01-02 PROCEDURE — 81003 URINALYSIS AUTO W/O SCOPE: CPT | Performed by: EMERGENCY MEDICINE

## 2019-01-02 PROCEDURE — 80076 HEPATIC FUNCTION PANEL: CPT | Performed by: EMERGENCY MEDICINE

## 2019-01-02 PROCEDURE — 99222 1ST HOSP IP/OBS MODERATE 55: CPT | Performed by: INTERNAL MEDICINE

## 2019-01-02 PROCEDURE — 36415 COLL VENOUS BLD VENIPUNCTURE: CPT | Performed by: EMERGENCY MEDICINE

## 2019-01-02 PROCEDURE — 85049 AUTOMATED PLATELET COUNT: CPT | Performed by: INTERNAL MEDICINE

## 2019-01-02 PROCEDURE — 85610 PROTHROMBIN TIME: CPT | Performed by: EMERGENCY MEDICINE

## 2019-01-02 PROCEDURE — 93010 ELECTROCARDIOGRAM REPORT: CPT | Performed by: INTERNAL MEDICINE

## 2019-01-02 PROCEDURE — 1123F ACP DISCUSS/DSCN MKR DOCD: CPT | Performed by: PSYCHIATRY & NEUROLOGY

## 2019-01-02 PROCEDURE — 70450 CT HEAD/BRAIN W/O DYE: CPT

## 2019-01-02 PROCEDURE — 85025 COMPLETE CBC W/AUTO DIFF WBC: CPT | Performed by: EMERGENCY MEDICINE

## 2019-01-02 PROCEDURE — 80048 BASIC METABOLIC PNL TOTAL CA: CPT | Performed by: EMERGENCY MEDICINE

## 2019-01-02 PROCEDURE — 82948 REAGENT STRIP/BLOOD GLUCOSE: CPT

## 2019-01-02 PROCEDURE — 99285 EMERGENCY DEPT VISIT HI MDM: CPT

## 2019-01-02 RX ORDER — TAMSULOSIN HYDROCHLORIDE 0.4 MG/1
0.4 CAPSULE ORAL
Status: DISCONTINUED | OUTPATIENT
Start: 2019-01-02 | End: 2019-01-05 | Stop reason: HOSPADM

## 2019-01-02 RX ORDER — ATORVASTATIN CALCIUM 10 MG/1
10 TABLET, FILM COATED ORAL DAILY
Status: DISCONTINUED | OUTPATIENT
Start: 2019-01-03 | End: 2019-01-05 | Stop reason: HOSPADM

## 2019-01-02 RX ORDER — ASPIRIN 81 MG/1
81 TABLET ORAL DAILY
Status: DISCONTINUED | OUTPATIENT
Start: 2019-01-03 | End: 2019-01-03

## 2019-01-02 RX ORDER — PANTOPRAZOLE SODIUM 20 MG/1
20 TABLET, DELAYED RELEASE ORAL
Status: DISCONTINUED | OUTPATIENT
Start: 2019-01-03 | End: 2019-01-05 | Stop reason: HOSPADM

## 2019-01-02 RX ORDER — MECLIZINE HYDROCHLORIDE 25 MG/1
25 TABLET ORAL EVERY 8 HOURS PRN
Status: DISCONTINUED | OUTPATIENT
Start: 2019-01-02 | End: 2019-01-05 | Stop reason: HOSPADM

## 2019-01-02 RX ORDER — METOPROLOL SUCCINATE 25 MG/1
12.5 TABLET, EXTENDED RELEASE ORAL DAILY
Status: DISCONTINUED | OUTPATIENT
Start: 2019-01-03 | End: 2019-01-05 | Stop reason: HOSPADM

## 2019-01-02 RX ORDER — NITROGLYCERIN 0.4 MG/1
0.4 TABLET SUBLINGUAL
Status: DISCONTINUED | OUTPATIENT
Start: 2019-01-02 | End: 2019-01-05 | Stop reason: HOSPADM

## 2019-01-02 RX ORDER — LEVOTHYROXINE SODIUM 0.12 MG/1
125 TABLET ORAL DAILY
Status: DISCONTINUED | OUTPATIENT
Start: 2019-01-03 | End: 2019-01-05 | Stop reason: HOSPADM

## 2019-01-02 RX ADMIN — TAMSULOSIN HYDROCHLORIDE 0.4 MG: 0.4 CAPSULE ORAL at 18:48

## 2019-01-02 RX ADMIN — INSULIN LISPRO 1 UNITS: 100 INJECTION, SOLUTION INTRAVENOUS; SUBCUTANEOUS at 18:48

## 2019-01-02 NOTE — ED PROVIDER NOTES
History  Chief Complaint   Patient presents with    Dizziness     Pt c/o vertigo x 2 days  Pt sts he took meclizine this morning and had some relief  Pt denies any chest pain/SOB, +vomiting x2      HPI patient is a 75-year-old male, he apparently is helping a person who fell recently by living in their home  Apparently she dropped her cane 2 nights ago and he startled and pulled his head up  He reports since that time he has been very dizzy  Patient reports a sense of the room is spinning  He also reports that when he looks at things occasionally he has blurry vision  He denies any headache  He reports he has had some nausea and vomiting when he turns his head  He reports when the dizziness gets severe he becomes nauseous  He denies any focal weakness  He denies any injury  He denies any numbness  Past medical history of dizziness, vertigo, Lyme disease  Family history noncontributory  Social history, nonsmoker no history of drug abuse  Prior to Admission Medications   Prescriptions Last Dose Informant Patient Reported? Taking? ONE TOUCH ULTRA TEST test strip   No Yes   Sig: CHECK BLOOD SUGAR DAILY *DX: E11 9   aspirin (ASPIRIN LOW DOSE) 81 MG tablet 1/1/2019 at Unknown time  Yes Yes   Sig: Take 1 tablet by mouth daily   atorvastatin (LIPITOR) 10 mg tablet 1/1/2019 at Unknown time  No Yes   Sig: TAKE 1 TABLET DAILY     cyanocobalamin (VITAMIN B-12) 1,000 mcg tablet 1/1/2019 at Unknown time  Yes Yes   Sig: Take by mouth   glipiZIDE (GLUCOTROL) 10 mg tablet 1/1/2019 at Unknown time  No Yes   Sig: Take 2 tablets (20 mg total) by mouth 2 (two) times a day   glucose blood (ONE TOUCH ULTRA TEST) test strip   Yes Yes   Sig: by In Vitro route   glucose blood test strip   Yes Yes   levothyroxine 125 mcg tablet 1/1/2019 at Unknown time  No Yes   Sig: Take 1 tablet (125 mcg total) by mouth daily   meclizine (ANTIVERT) 12 5 MG tablet 1/2/2019 at Unknown time  No Yes   Sig: Take 1 tablet (12 5 mg total) by mouth every 8 (eight) hours as needed for dizziness   metFORMIN (GLUCOPHAGE-XR) 500 mg 24 hr tablet 1/1/2019 at Unknown time  No Yes   Sig: Take 1 tablet (500 mg total) by mouth 2 (two) times a day with meals   metoprolol succinate (TOPROL-XL) 25 mg 24 hr tablet 1/1/2019 at Unknown time  Yes Yes   Sig: Take 0 5 tablets by mouth daily   nateglinide (STARLIX) 120 mg tablet 1/1/2019 at Unknown time  No Yes   Sig: TAKE 1 TABLET THREE TIMES A DAY BEFORE MEALS   nitroglycerin (NITROSTAT) 0 4 mg SL tablet Unknown at Unknown time  Yes No   Sig: Place 1 tablet under the tongue every 5 (five) minutes as needed   omeprazole (PRILOSEC) 20 mg delayed release capsule 1/1/2019 at Unknown time  No Yes   Sig: Take 1 capsule (20 mg total) by mouth daily   pioglitazone (ACTOS) 45 mg tablet 1/1/2019 at Unknown time  Yes Yes   Sig: Take 1 tablet by mouth daily   polyethylene glycol (MIRALAX) 17 g packet Unknown at Unknown time  Yes No   potassium-sodium phosphateS (PHOSPHA 250 NEUTRAL) 155-852-130 mg tablet Unknown at Unknown time  Yes No   tamsulosin (FLOMAX) 0 4 mg Unknown at Unknown time  No No   Sig: Take 1 capsule (0 4 mg total) by mouth daily with dinner for 30 days      Facility-Administered Medications: None       Past Medical History:   Diagnosis Date    BRBPR (bright red blood per rectum)     Last Assessed: 1/11/2016    Chest tightness or pressure     Last Assessed: 3/27/2014    Cholecystitis     Last Assessed: 9/1/2015    Diabetes mellitus ([facility] [address] )     Lyme disease     Shortness of breath     Last Assessed: 3/27/2014    Vertigo     Last Assessed: 12/31/2015       Past Surgical History:   Procedure Laterality Date    CATARACT EXTRACTION      Last Assessed: 11/11/2016    HERNIA REPAIR      LAPAROSCOPIC CHOLECYSTECTOMY      TONSILLECTOMY         Family History   Problem Relation Age of Onset    No Known Problems Mother     Bipolar disorder Family         II    Tongue cancer Family     Prostate cancer Family     Other Family         cardiac disorder     I have reviewed and agree with the history as documented  Social History   Substance Use Topics    Smoking status: Never Smoker    Smokeless tobacco: Never Used    Alcohol use No        Review of Systems   Constitutional: Negative for diaphoresis, fatigue and fever  HENT: Negative for congestion, ear pain, nosebleeds and sore throat  Eyes: Negative for photophobia, pain, discharge and visual disturbance  Respiratory: Negative for cough, choking, chest tightness, shortness of breath and wheezing  Cardiovascular: Negative for chest pain and palpitations  Gastrointestinal: Negative for abdominal distention, abdominal pain, diarrhea and vomiting  Genitourinary: Negative for dysuria, flank pain and frequency  Musculoskeletal: Negative for back pain, gait problem and joint swelling  Skin: Negative for color change and rash  Neurological: Positive for dizziness  Negative for syncope and headaches  Psychiatric/Behavioral: Negative for behavioral problems and confusion  The patient is not nervous/anxious  All other systems reviewed and are negative  Physical Exam  Physical Exam   Constitutional: He is oriented to person, place, and time  He appears well-developed and well-nourished  HENT:   Head: Normocephalic  Right Ear: External ear normal    Left Ear: External ear normal    Nose: Nose normal    Mouth/Throat: Oropharynx is clear and moist    Eyes: Pupils are equal, round, and reactive to light  EOM and lids are normal    Neck: Normal range of motion  Neck supple  Cardiovascular: Normal rate, regular rhythm, normal heart sounds and intact distal pulses  Pulmonary/Chest: Effort normal and breath sounds normal  No respiratory distress  Abdominal: Soft  Bowel sounds are normal  There is no tenderness  Musculoskeletal: Normal range of motion  He exhibits no deformity  Neurological: He is alert and oriented to person, place, and time   He has normal strength  A sensory deficit is present  No cranial nerve deficit  Coordination normal  GCS eye subscore is 4  GCS verbal subscore is 5  GCS motor subscore is 6  There is increased dizziness when the patient turns his head  Skin: Skin is warm and dry  Psychiatric: He has a normal mood and affect  Nursing note and vitals reviewed     Pulse oximetry 97% on room air adequate oxygenation, there is no hypoxia    Vital Signs  ED Triage Vitals [01/02/19 1328]   Temperature Pulse Respirations Blood Pressure SpO2   98 °F (36 7 °C) 80 18 168/70 97 %      Temp Source Heart Rate Source Patient Position - Orthostatic VS BP Location FiO2 (%)   Axillary Monitor Lying Right arm --      Pain Score       No Pain           Vitals:    01/02/19 1328 01/02/19 1443 01/02/19 1647 01/02/19 1658   BP: 168/70 122/59 136/63 153/63   Pulse: 80 92 83 78   Patient Position - Orthostatic VS: Lying Sitting Sitting Sitting       Visual Acuity  Visual Acuity      Most Recent Value   L Pupil Size (mm)  3   R Pupil Size (mm)  3   L Pupil Shape  Round   R Pupil Shape  Round          ED Medications  Medications   aspirin (ECOTRIN LOW STRENGTH) EC tablet 81 mg (not administered)   atorvastatin (LIPITOR) tablet 10 mg (not administered)   levothyroxine tablet 125 mcg (not administered)   metoprolol succinate (TOPROL-XL) 24 hr tablet 12 5 mg (not administered)   nitroglycerin (NITROSTAT) SL tablet 0 4 mg (not administered)   pantoprazole (PROTONIX) EC tablet 20 mg (not administered)   tamsulosin (FLOMAX) capsule 0 4 mg (0 4 mg Oral Given 1/2/19 1848)   meclizine (ANTIVERT) tablet 25 mg (not administered)   enoxaparin (LOVENOX) subcutaneous injection 40 mg (not administered)   insulin lispro (HumaLOG) 100 units/mL subcutaneous injection 1-5 Units (1 Units Subcutaneous Given 1/2/19 1848)       Diagnostic Studies  Results Reviewed     Procedure Component Value Units Date/Time    UA w Reflex to Microscopic w Reflex to Culture [[id number]]  (Abnormal) Collected:  01/02/19 1721    Lab Status:  Final result Specimen:  Urine from Urine, Clean Catch Updated:  01/02/19 1730     Color, UA Yellow     Clarity, UA Clear     Specific Gravity, UA 1 015     pH, UA 7 0     Leukocytes, UA Negative     Nitrite, UA Negative     Protein, UA Negative mg/dl      Glucose, UA Negative mg/dl      Ketones, UA 15 (1+) (A) mg/dl      Urobilinogen, UA 0 2 E U /dl      Bilirubin, UA Negative     Blood, UA Negative    Troponin I [[id number]]  (Normal) Collected:  01/02/19 1407    Lab Status:  Final result Specimen:  Blood from Arm, Left Updated:  01/02/19 1434     Troponin I <0 02 ng/mL     Basic metabolic panel [[id number]]  (Abnormal) Collected:  01/02/19 1407    Lab Status:  Final result Specimen:  Blood from Arm, Left Updated:  01/02/19 1430     Sodium 140 mmol/L      Potassium 4 3 mmol/L      Chloride 104 mmol/L      CO2 28 mmol/L      ANION GAP 8 mmol/L      BUN 19 mg/dL      Creatinine 0 95 mg/dL      Glucose 162 (H) mg/dL      Calcium 9 6 mg/dL      eGFR 71 ml/min/1 73sq m     Narrative:         National Kidney Disease Education Program recommendations are as follows:  GFR calculation is accurate only with a steady state creatinine  Chronic Kidney disease less than 60 ml/min/1 73 sq  meters  Kidney failure less than 15 ml/min/1 73 sq  meters      Hepatic function panel [[id number]]  (Abnormal) Collected:  01/02/19 1407    Lab Status:  Final result Specimen:  Blood from Arm, Left Updated:  01/02/19 1430     Total Bilirubin 0 30 mg/dL      Bilirubin, Direct 0 08 mg/dL      Alkaline Phosphatase 73 U/L      AST 19 U/L      ALT 22 U/L      Total Protein 7 8 g/dL      Albumin 2 9 (L) g/dL     Protime-INR [[id number]]  (Normal) Collected:  01/02/19 1407    Lab Status:  Final result Specimen:  Blood from Arm, Left Updated:  01/02/19 1424     Protime 13 1 seconds      INR 1 00    CBC and differential [[id number]] Collected:  01/02/19 1407    Lab Status:  Final result Specimen:  Blood from Arm, Left Updated:  01/02/19 1415     WBC 8 53 Thousand/uL      RBC 4 35 Million/uL      Hemoglobin 13 1 g/dL      Hematocrit 40 6 %      MCV 93 fL      MCH 30 1 pg      MCHC 32 3 g/dL      RDW 12 7 %      MPV 9 9 fL      Platelets 322 Thousands/uL      nRBC 0 /100 WBCs      Neutrophils Relative 60 %      Immat GRANS % 1 %      Lymphocytes Relative 25 %      Monocytes Relative 9 %      Eosinophils Relative 4 %      Basophils Relative 1 %      Neutrophils Absolute 5 17 Thousands/µL      Immature Grans Absolute 0 09 Thousand/uL      Lymphocytes Absolute 2 16 Thousands/µL      Monocytes Absolute 0 73 Thousand/µL      Eosinophils Absolute 0 32 Thousand/µL      Basophils Absolute 0 06 Thousands/µL                  XR chest pa & lateral   Final Result by Ga Gray DO (01/02 1439)      No acute cardiopulmonary disease  Workstation performed: [id number]         CT head without contrast   Final Result by Alphonse Ruiz MD (01/02 1423)      No acute intracranial abnormality  Workstation performed: [id number]                    Procedures  Procedures       Phone Contacts  ED Phone Contact    ED Course           Identification of Seniors at Risk      Most Recent Value   (ISAR) Identification of Seniors at Risk   Before the illness or injury that brought you to the Emergency, did you need someone to help you on a regular basis? 0 Filed at: 01/02/2019 1331   In the last 24 hours, have you needed more help than usual?  0 Filed at: 01/02/2019 1331   Have you been hospitalized for one or more nights during the past 6 months? 1 Filed at: 01/02/2019 1331   In general, do you see well?  0 Filed at: 01/02/2019 1331   In general, do you have serious problems with your memory? 0 Filed at: 01/02/2019 1331   Do you take more than three different medications every day?   1 Filed at: 01/02/2019 1331   ISAR Score  2 Filed at: 01/02/2019 1331         Chest x-ray: Chest x-ray showed a normal cardiac silhouette, no pneumothorax no infiltrates, No sign of pathology, interpreted by me, I was the primary   CT scan of brain was normal   Urinalysis showed no sign of infection  Cardiac troponin was normal no sign of cardiac ischemia  Patient's electrolytes were within normal limits  No sign of dehydration, glucose was minimally elevated at 162  Patient's liver functions were normal    patient's white count was normal at 8 5 no sign of inflammation, hemoglobin was normal at 13 no sign of anemia                MDM medical decision making 43-year-old male presents emergency department with dizziness and blurry vision especially with turning his head, symptoms most consistent with peripheral vertigo but the patient is 88 in unsteady with standing  I am concerned about vertebral basilar insufficiency and brainstem stroke  CT was negative  Patient will require further diagnostic testing and he needs to stay in the hospital cause he may fall  Discussed with hospitalist service will admit discussed the patient agrees to admission  CritCare Time    Disposition  Final diagnoses:   Dizziness     Time reflects when diagnosis was documented in both MDM as applicable and the Disposition within this note     Time User Action Codes Description Comment    1/2/2019  3:07 PM Frida Mancuso Add [R42] Dizziness       ED Disposition     ED Disposition Condition Comment    Admit  Case was discussed with hospitalist service and the patient's admission status was agreed to be 2 midnights the service of Dr Neville        Follow-up Information    None         Current Discharge Medication List      CONTINUE these medications which have NOT CHANGED    Details   aspirin (ASPIRIN LOW DOSE) 81 MG tablet Take 1 tablet by mouth daily      atorvastatin (LIPITOR) 10 mg tablet TAKE 1 TABLET DAILY    Qty: 30 tablet, Refills: 8    Associated Diagnoses: HLD (hyperlipidemia)      cyanocobalamin (VITAMIN B-12) 1,000 mcg tablet Take by mouth      glipiZIDE (GLUCOTROL) 10 mg tablet Take 2 tablets (20 mg total) by mouth 2 (two) times a day  Qty: 60 tablet, Refills: 5    Associated Diagnoses: Type 2 diabetes mellitus without complication, without long-term current use of insulin ([address] )      ! ! glucose blood (ONE TOUCH ULTRA TEST) test strip by In Vitro route      !! glucose blood test strip       levothyroxine 125 mcg tablet Take 1 tablet (125 mcg total) by mouth daily  Qty: 90 tablet, Refills: 3    Associated Diagnoses: Hypothyroidism, unspecified type      meclizine (ANTIVERT) 12 5 MG tablet Take 1 tablet (12 5 mg total) by mouth every 8 (eight) hours as needed for dizziness  Qty: 30 tablet, Refills: 0    Associated Diagnoses: Vertigo      metFORMIN (GLUCOPHAGE-XR) 500 mg 24 hr tablet Take 1 tablet (500 mg total) by mouth 2 (two) times a day with meals  Qty: 180 tablet, Refills: 3    Associated Diagnoses: Type 2 diabetes mellitus without complication, without long-term current use of insulin ([facility])      metoprolol succinate (TOPROL-XL) 25 mg 24 hr tablet Take 0 5 tablets by mouth daily      nateglinide (STARLIX) 120 mg tablet TAKE 1 TABLET THREE TIMES A DAY BEFORE MEALS  Qty: 270 tablet, Refills: 3    Associated Diagnoses: Type 2 diabetes mellitus without complication, without long-term current use of insulin ([facility])      omeprazole (PRILOSEC) 20 mg delayed release capsule Take 1 capsule (20 mg total) by mouth daily  Qty: 180 capsule, Refills: 0    Associated Diagnoses: Gastroesophageal reflux disease, esophagitis presence not specified      !! ONE TOUCH ULTRA TEST test strip CHECK BLOOD SUGAR DAILY *DX: E11 9  Qty: 100 each, Refills: 3    Comments: DX Code Needed      Associated Diagnoses: Type 2 diabetes mellitus without complication, without long-term current use of insulin ([facility])      pioglitazone (ACTOS) 45 mg tablet Take 1 tablet by mouth daily      nitroglycerin (NITROSTAT) 0 4 mg SL tablet Place 1 tablet under the tongue every 5 (five) minutes as needed polyethylene glycol (MIRALAX) 17 g packet       potassium-sodium phosphateS (PHOSPHA 250 NEUTRAL) 155-852-130 mg tablet       tamsulosin (FLOMAX) 0 4 mg Take 1 capsule (0 4 mg total) by mouth daily with dinner for 30 days  Qty: 30 capsule, Refills: 0    Associated Diagnoses: Benign prostatic hyperplasia       !! - Potential duplicate medications found  Please discuss with provider  No discharge procedures on file      ED Provider  Electronically Signed by           Queta Blanco MD  01/02/19 5232

## 2019-01-02 NOTE — TELEPHONE ENCOUNTER
Visiting nurse went to patient home and found him very weak with vomiting and dizziness  She is recommending and sending patient to ER  If you have any questions, you can call her at [phone]

## 2019-01-02 NOTE — H&P
History and Physical - [facility]    Patient Information: Chin Genao 80 y o  male MRN: [id number]  Unit/Bed#: ED 17 Encounter: [id number]  Admitting Physician: Nimesh Moe MD  PCP: eJb Wills MD  Date of Admission:  01/02/19    Assessment/Plan:    Hospital Problem List:     Principal Problem:    Dizziness  Active Problems:    Hypothyroidism    Diabetes mellitus, type 2 ([address] )    Benign prostatic hyperplasia    DDD (degenerative disc disease), lumbar      Plan for the Primary Problem(s):  · 1  Dizziness- CT head negative  Will give meclizine prn  Neurology to evaluate  · 2  DM- on ISS  · 3  BPH- on flomax  · 4  Hypothyroidism- on levothyroxine  ·     Plan for Additional Problems:   ·     VTE Prophylaxis: Enoxaparin (Lovenox)  / sequential compression device   Code Status: Full  POLST: There is no POLST form on file for this patient (pre-hospital)    Anticipated Length of Stay:  Patient will be admitted on an Inpatient basis with an anticipated length of stay of  More than 2 midnights  Justification for Hospital Stay: dizziness    Total Time for Visit, including Counseling / Coordination of Care: 30 minutes  Greater than 50% of this total time spent on direct patient counseling and coordination of care  Chief Complaint:   Dizziness    History of Present Illness:    Chin Genao is a 80 y o  male who presents with pmhx of DM, hypothyroidism, BPH comes with complaints of dizziness with nausea and vomiting when siting up this morning out of bed  Patient felt like the room was spinning  He denies any ringing in the ears  Patient states that he was also very nauseated and vomit twice  Patient denies any blurry vision or headache  No slurred speech, facial weakness or extremity weakness  Patient will be admitted for further evaluation  Review of Systems:    Review of Systems   HENT: Negative  Eyes: Negative  Respiratory: Negative  Cardiovascular: Negative  Gastrointestinal: Positive for vomiting  Negative for abdominal distention, abdominal pain, anal bleeding, blood in stool, constipation, diarrhea and rectal pain  Musculoskeletal: Negative  Skin: Negative  Neurological: Positive for dizziness, weakness and light-headedness  Negative for tremors, seizures, syncope, facial asymmetry, speech difficulty, numbness and headaches  Past Medical and Surgical History:     Past Medical History:   Diagnosis Date    BRBPR (bright red blood per rectum)     Last Assessed: 1/11/2016    Chest tightness or pressure     Last Assessed: 3/27/2014    Cholecystitis     Last Assessed: 9/1/2015    Diabetes mellitus ([facility] [address] )     Lyme disease     Shortness of breath     Last Assessed: 3/27/2014    Vertigo     Last Assessed: 12/31/2015       Past Surgical History:   Procedure Laterality Date    CATARACT EXTRACTION      Last Assessed: 11/11/2016    HERNIA REPAIR      LAPAROSCOPIC CHOLECYSTECTOMY      TONSILLECTOMY         Meds/Allergies:    Prior to Admission medications    Medication Sig Start Date End Date Taking? Authorizing Provider   aspirin (ASPIRIN LOW DOSE) 81 MG tablet Take 1 tablet by mouth daily 4/8/14   Historical Provider, MD   atorvastatin (LIPITOR) 10 mg tablet TAKE 1 TABLET DAILY   8/27/18   Avis Lang PA-C   cyanocobalamin (VITAMIN B-12) 1,000 mcg tablet Take by mouth    Historical Provider, MD   glipiZIDE (GLUCOTROL) 10 mg tablet Take 2 tablets (20 mg total) by mouth 2 (two) times a day 11/27/18   Stephanie Barnes PA-C   glucose blood (ONE TOUCH ULTRA TEST) test strip by In Vitro route 3/9/15   Historical Provider, MD   glucose blood test strip  8/1/15   Historical Provider, MD   levothyroxine 125 mcg tablet Take 1 tablet (125 mcg total) by mouth daily 12/31/18   Yoana Hernández MD   meclizine (ANTIVERT) 12 5 MG tablet Take 1 tablet (12 5 mg total) by mouth every 8 (eight) hours as needed for dizziness 12/6/18   Humaira Llanos MD   metFORMIN (GLUCOPHAGE-XR) 500 mg 24 hr tablet Take 1 tablet (500 mg total) by mouth 2 (two) times a day with meals 2/19/18   Estuardo Moreno MD   metoprolol succinate (TOPROL-XL) 25 mg 24 hr tablet Take 0 5 tablets by mouth daily 6/10/14   Historical Provider, MD   nateglinide (STARLIX) 120 mg tablet TAKE 1 TABLET THREE TIMES A DAY BEFORE MEALS 8/13/18   Estuardo Moreno MD   nitroglycerin (NITROSTAT) 0 4 mg SL tablet Place 1 tablet under the tongue every 5 (five) minutes as needed 9/23/14   Historical Provider, MD   omeprazole (PRILOSEC) 20 mg delayed release capsule Take 1 capsule (20 mg total) by mouth daily 9/24/18   Estuardo Moreno MD   ONE TOUCH ULTRA TEST test strip CHECK BLOOD SUGAR DAILY *DX: E11 9 4/9/18   Estuardo Moreno MD   pioglitazone (ACTOS) 45 mg tablet Take 1 tablet by mouth daily 5/18/17   Historical Provider, MD   polyethylene glycol (MIRALAX) 17 g packet  8/21/15   Historical Provider, MD   potassium-sodium phosphateS (PHOSPHA 250 NEUTRAL) 155-852-130 mg tablet  8/21/15   Historical Provider, MD   tamsulosin (FLOMAX) 0 4 mg Take 1 capsule (0 4 mg total) by mouth daily with dinner for 30 days 12/7/18 1/6/19  Jeremias Abdullahi MD     I have reviewed home medications with patient personally  Allergies: No Known Allergies    Social History:     Marital Status:     Occupation:   Patient Pre-hospital Living Situation: home  Patient Pre-hospital Level of Mobility: walks  Patient Pre-hospital Diet Restrictions: diabetic  Substance Use History:   History   Alcohol Use No     History   Smoking Status    Never Smoker   Smokeless Tobacco    Never Used     History   Drug Use No       Family History:    non-contributory    Physical Exam:     Vitals:   Blood Pressure: 122/59 (01/02/19 1443)  Pulse: 92 (01/02/19 1443)  Temperature: 98 °F (36 7 °C) (01/02/19 1328)  Temp Source: Axillary (01/02/19 1328)  Respirations: 18 (01/02/19 1328)  Height: 5' 7" (170 2 cm) (01/02/19 1328)  Weight - Scale: 69 6 kg (153 lb 7 oz) (01/02/19 1328)  SpO2: 96 % (01/02/19 1443)    Physical Exam   Constitutional: He is oriented to person, place, and time  He appears well-developed and well-nourished  HENT:   Head: Normocephalic and atraumatic  Eyes: Pupils are equal, round, and reactive to light  Conjunctivae and EOM are normal    Neck: Normal range of motion  Neck supple  No JVD present  No tracheal deviation present  No thyromegaly present  Cardiovascular: Normal rate, regular rhythm and normal heart sounds  Exam reveals no gallop and no friction rub  No murmur heard  Pulmonary/Chest: Effort normal and breath sounds normal  No respiratory distress  He has no wheezes  He has no rales  Abdominal: Soft  Bowel sounds are normal  He exhibits no distension  There is no tenderness  There is no rebound  Musculoskeletal: Normal range of motion  He exhibits no edema  Neurological: He is alert and oriented to person, place, and time  Skin: Skin is warm and dry  No rash noted  No erythema  Vitals reviewed  Additional Data:     Lab Results: I have personally reviewed pertinent reports  Results from last 7 days  Lab Units 01/02/19  1407   WBC Thousand/uL 8 53   HEMOGLOBIN g/dL 13 1   HEMATOCRIT % 40 6   PLATELETS Thousands/uL 285   NEUTROS PCT % 60   LYMPHS PCT % 25   MONOS PCT % 9   EOS PCT % 4       Results from last 7 days  Lab Units 01/02/19  1407   POTASSIUM mmol/L 4 3   CHLORIDE mmol/L 104   CO2 mmol/L 28   BUN mg/dL 19   CREATININE mg/dL 0 95   CALCIUM mg/dL 9 6   ALK PHOS U/L 73   ALT U/L 22   AST U/L 19       Results from last 7 days  Lab Units 01/02/19  1407   INR  1 00       Imaging: I have personally reviewed pertinent reports  Xr Chest Pa & Lateral    Result Date: 1/2/2019  Narrative: CHEST INDICATION:   Dizziness and weakness  COMPARISON:  9/24/2018 EXAM PERFORMED/VIEWS:  XR CHEST AP & LATERAL FINDINGS:  Monitoring leads and clips project over the chest  Cardiomediastinal silhouette appears unremarkable   No acute infiltrates  Minimal reticular densities noted in the costophrenic angles  Mild elevation right diaphragm  No pneumothorax or pleural effusion  Degenerative changes of the spine  Impression: No acute cardiopulmonary disease  Workstation performed: [id number]     Ct Head Without Contrast    Result Date: 1/2/2019  Narrative: CT BRAIN - WITHOUT CONTRAST INDICATION:   Dizziness  COMPARISON:  December 4, 2018  TECHNIQUE:  CT examination of the brain was performed  In addition to axial images, coronal 2D reformatted images were created and submitted for interpretation  Radiation dose length product (DLP) for this visit:  [phone] mGy-cm   This examination, like all CT scans performed in the [facility], was performed utilizing techniques to minimize radiation dose exposure, including the use of iterative reconstruction and automated exposure control  IMAGE QUALITY:  Diagnostic  FINDINGS: PARENCHYMA: Decreased attenuation is noted in periventricular and subcortical white matter demonstrating an appearance that is statistically most likely to represent mild microangiopathic change; this appearance is similar when compared to most recent prior examination  Atherosclerotic carotid and vertebral artery calcification is noted  No CT signs of acute infarction  No intracranial mass, mass effect or midline shift  No acute parenchymal hemorrhage  VENTRICLES AND EXTRA-AXIAL SPACES:  Ventricles and extra-axial CSF spaces are prominent commensurate with the degree of volume loss  No hydrocephalus  No acute extra-axial hemorrhage  VISUALIZED ORBITS AND PARANASAL SINUSES:  Unremarkable  CALVARIUM AND EXTRACRANIAL SOFT TISSUES:  Normal      Impression: No acute intracranial abnormality  Workstation performed: [id number]     Ct Head Without Contrast    Result Date: 12/4/2018  Narrative: CT BRAIN - WITHOUT CONTRAST INDICATION:   dizzy, fall  COMPARISON:  None   TECHNIQUE:  CT examination of the brain was performed  In addition to axial images, coronal 2D reformatted images were created and submitted for interpretation  Radiation dose length product (DLP) for this visit:  939 mGy-cm   This examination, like all CT scans performed in the [facility], was performed utilizing techniques to minimize radiation dose exposure, including the use of iterative reconstruction and automated exposure control  IMAGE QUALITY:  Diagnostic  FINDINGS: PARENCHYMA: Decreased attenuation is noted in periventricular and subcortical white matter demonstrating an appearance that is statistically most likely to represent mild microangiopathic change  No CT signs of acute infarction  No intracranial mass, mass effect or midline shift  No acute parenchymal hemorrhage  VENTRICLES AND EXTRA-AXIAL SPACES:  Normal for the patient's age  VISUALIZED ORBITS AND PARANASAL SINUSES:  Right lens replacement CALVARIUM AND EXTRACRANIAL SOFT TISSUES:  Normal      Impression: No acute intracranial abnormality  Microangiopathic changes  Workstation performed: [id number]       EKG, Pathology, and Other Studies Reviewed on Admission:   · EKG:     Allscripts / Epic Records Reviewed: Yes     ** Please Note: This note has been constructed using a voice recognition system   **

## 2019-01-03 ENCOUNTER — APPOINTMENT (INPATIENT)
Dept: MRI IMAGING | Facility: HOSPITAL | Age: 84
DRG: 149 | End: 2019-01-03
Payer: MEDICARE

## 2019-01-03 LAB
ANION GAP SERPL CALCULATED.3IONS-SCNC: 7 MMOL/L (ref 4–13)
BUN SERPL-MCNC: 18 MG/DL (ref 5–25)
CALCIUM SERPL-MCNC: 9.5 MG/DL (ref 8.3–10.1)
CHLORIDE SERPL-SCNC: 105 MMOL/L (ref 100–108)
CO2 SERPL-SCNC: 28 MMOL/L (ref 21–32)
CREAT SERPL-MCNC: 0.92 MG/DL (ref 0.6–1.3)
ERYTHROCYTE [DISTWIDTH] IN BLOOD BY AUTOMATED COUNT: 12.6 % (ref 11.6–15.1)
GFR SERPL CREATININE-BSD FRML MDRD: 74 ML/MIN/1.73SQ M
GLUCOSE SERPL-MCNC: 198 MG/DL (ref 65–140)
GLUCOSE SERPL-MCNC: 214 MG/DL (ref 65–140)
GLUCOSE SERPL-MCNC: 269 MG/DL (ref 65–140)
GLUCOSE SERPL-MCNC: 301 MG/DL (ref 65–140)
GLUCOSE SERPL-MCNC: 382 MG/DL (ref 65–140)
HCT VFR BLD AUTO: 38.5 % (ref 36.5–49.3)
HGB BLD-MCNC: 12.5 G/DL (ref 12–17)
MCH RBC QN AUTO: 30.3 PG (ref 26.8–34.3)
MCHC RBC AUTO-ENTMCNC: 32.5 G/DL (ref 31.4–37.4)
MCV RBC AUTO: 93 FL (ref 82–98)
PLATELET # BLD AUTO: 250 THOUSANDS/UL (ref 149–390)
PMV BLD AUTO: 9.4 FL (ref 8.9–12.7)
POTASSIUM SERPL-SCNC: 4.3 MMOL/L (ref 3.5–5.3)
RBC # BLD AUTO: 4.12 MILLION/UL (ref 3.88–5.62)
SODIUM SERPL-SCNC: 140 MMOL/L (ref 136–145)
WBC # BLD AUTO: 6.84 THOUSAND/UL (ref 4.31–10.16)

## 2019-01-03 PROCEDURE — 97163 PT EVAL HIGH COMPLEX 45 MIN: CPT

## 2019-01-03 PROCEDURE — G8988 SELF CARE GOAL STATUS: HCPCS

## 2019-01-03 PROCEDURE — G8979 MOBILITY GOAL STATUS: HCPCS

## 2019-01-03 PROCEDURE — G8987 SELF CARE CURRENT STATUS: HCPCS

## 2019-01-03 PROCEDURE — 70553 MRI BRAIN STEM W/O & W/DYE: CPT

## 2019-01-03 PROCEDURE — 99232 SBSQ HOSP IP/OBS MODERATE 35: CPT | Performed by: INTERNAL MEDICINE

## 2019-01-03 PROCEDURE — G8978 MOBILITY CURRENT STATUS: HCPCS

## 2019-01-03 PROCEDURE — 82948 REAGENT STRIP/BLOOD GLUCOSE: CPT

## 2019-01-03 PROCEDURE — 99222 1ST HOSP IP/OBS MODERATE 55: CPT | Performed by: PSYCHIATRY & NEUROLOGY

## 2019-01-03 PROCEDURE — 97167 OT EVAL HIGH COMPLEX 60 MIN: CPT

## 2019-01-03 PROCEDURE — 80048 BASIC METABOLIC PNL TOTAL CA: CPT | Performed by: INTERNAL MEDICINE

## 2019-01-03 PROCEDURE — 70543 MRI ORBT/FAC/NCK W/O &W/DYE: CPT

## 2019-01-03 PROCEDURE — 85027 COMPLETE CBC AUTOMATED: CPT | Performed by: INTERNAL MEDICINE

## 2019-01-03 PROCEDURE — A9585 GADOBUTROL INJECTION: HCPCS | Performed by: PHYSICIAN ASSISTANT

## 2019-01-03 RX ORDER — INSULIN GLARGINE 100 [IU]/ML
20 INJECTION, SOLUTION SUBCUTANEOUS EVERY MORNING
Status: DISCONTINUED | OUTPATIENT
Start: 2019-01-03 | End: 2019-01-05 | Stop reason: HOSPADM

## 2019-01-03 RX ORDER — ASPIRIN 81 MG/1
81 TABLET ORAL DAILY
Status: DISCONTINUED | OUTPATIENT
Start: 2019-01-04 | End: 2019-01-05 | Stop reason: HOSPADM

## 2019-01-03 RX ADMIN — INSULIN LISPRO 2 UNITS: 100 INJECTION, SOLUTION INTRAVENOUS; SUBCUTANEOUS at 16:20

## 2019-01-03 RX ADMIN — INSULIN GLARGINE 20 UNITS: 100 INJECTION, SOLUTION SUBCUTANEOUS at 12:16

## 2019-01-03 RX ADMIN — ASPIRIN 81 MG: 81 TABLET, COATED ORAL at 08:57

## 2019-01-03 RX ADMIN — MECLIZINE HYDROCHLORIDE 25 MG: 25 TABLET ORAL at 04:51

## 2019-01-03 RX ADMIN — ENOXAPARIN SODIUM 40 MG: 40 INJECTION SUBCUTANEOUS at 08:58

## 2019-01-03 RX ADMIN — TAMSULOSIN HYDROCHLORIDE 0.4 MG: 0.4 CAPSULE ORAL at 16:21

## 2019-01-03 RX ADMIN — INSULIN LISPRO 3 UNITS: 100 INJECTION, SOLUTION INTRAVENOUS; SUBCUTANEOUS at 10:44

## 2019-01-03 RX ADMIN — INSULIN LISPRO 1 UNITS: 100 INJECTION, SOLUTION INTRAVENOUS; SUBCUTANEOUS at 08:58

## 2019-01-03 RX ADMIN — ATORVASTATIN CALCIUM 10 MG: 10 TABLET, FILM COATED ORAL at 08:57

## 2019-01-03 RX ADMIN — PANTOPRAZOLE SODIUM 20 MG: 20 TABLET, DELAYED RELEASE ORAL at 04:47

## 2019-01-03 RX ADMIN — METOPROLOL SUCCINATE 12.5 MG: 25 TABLET, EXTENDED RELEASE ORAL at 08:57

## 2019-01-03 RX ADMIN — GADOBUTROL 6 ML: 604.72 INJECTION INTRAVENOUS at 13:54

## 2019-01-03 NOTE — CONSULTS
Consultation - Neurology   Bernardino Negron 80 y o  male MRN: [id number]  Unit/Bed#: -01 Encounter: [id number]      Assessment/Plan   Assessment:  40-year-old male with history of hyperlipidemia, diabetes, thyroid dysfunction, longstanding history of vertigo/dizziness, who neurology is asked to evaluate in regards to dizziness and nauseousness (worsened with head movements and position changes)  Description of symptoms and clinical exam consistent with peripheral etiology  Plan:  1  Dizziness: description and clinical presentation consistent with peripheral/inner ear pathology as origin of symptoms  -CT head negative for acute intracranial pathology -With proptosis and gaze restriction on exam (and to rule out central causes of dizziness) will recommend getting MRI brain and orbits to rule out acute intracranial pathology  -TSH in early December 2018 within normal limits  -PT/OT following    -Recommend vestibular rehab as outpatient once medically stable for discharge  Epley maneuver education     -Meclizine/antiemetics as needed for symptoms  2  Medical management per primary team     Discussed plan of care with attending neurologist        History of Present Illness      Reason for Consult / Principal Problem: Dizziness    HPI: Bernardino Negron is a 80 y o  male with history of diabetes, thyroid dysfunction neurology is asked to evaluate in regards to episode of dizziness upon standing up/lifting his head several nights ago  History obtained via chart review and discussion with patient: Patient lives with a friend who dropped her cane at home several nights ago; patient got out of bed to help  the cane, and upon standing up he felt severely dizzy (room spinning) Since that initial episode a few nights ago he has been dizzy and nauseous with position all changes/head turning   Patient stayed at home the next or two with no improvement in symptoms and given this the patient's friend called EMS to come bring the patient to the hospital      Workup thus far revealed CT head negative for acute intracranial pathology, UA unremarkable, no febrile state no leukocytosis, chest x-ray unremarkable  No gross metabolic derangements  Per review of chart as well, patient was recently admitted for dizziness and urinary symptoms, was found to have UTI and given fluids and meclizine at that time with improvement of vertiginous symptoms  Patient states therapies/aides have been coming to patient's home the past several weeks/months  Inpatient consult to Neurology  Consult performed by: Adriana Baez ordered by: Salvador Ramires          Review of Systems   Constitutional: Negative  HENT: Negative for trouble swallowing  Eyes: Negative for photophobia and visual disturbance  Respiratory: Negative  Cardiovascular: Negative  Gastrointestinal: Negative  Musculoskeletal: Positive for gait problem  Skin: Negative  Neurological: Positive for dizziness and light-headedness  Negative for tremors, seizures, syncope, facial asymmetry, speech difficulty, weakness, numbness and headaches  All other ROS reviewed and negative       Historical Information   Past Medical History:   Diagnosis Date    BRBPR (bright red blood per rectum)     Last Assessed: 1/11/2016    Chest tightness or pressure     Last Assessed: 3/27/2014    Cholecystitis     Last Assessed: 9/1/2015    Diabetes mellitus ([facility] [address] )     Lyme disease     Shortness of breath     Last Assessed: 3/27/2014    Vertigo     Last Assessed: 12/31/2015     Past Surgical History:   Procedure Laterality Date    CATARACT EXTRACTION      Last Assessed: 11/11/2016    HERNIA REPAIR      LAPAROSCOPIC CHOLECYSTECTOMY      TONSILLECTOMY       Social History   History   Alcohol Use No     History   Drug Use No     History   Smoking Status    Never Smoker   Smokeless Tobacco    Never Used     Family History:   Family History   Problem Relation Age of Onset    No Known Problems Mother     Bipolar disorder Family         II    Tongue cancer Family     Prostate cancer Family     Other Family         cardiac disorder       Review of previous medical records was completed  Meds/Allergies   current meds:   Current Facility-Administered Medications   Medication Dose Route Frequency    aspirin (ECOTRIN LOW STRENGTH) EC tablet 81 mg  81 mg Oral Daily    atorvastatin (LIPITOR) tablet 10 mg  10 mg Oral Daily    enoxaparin (LOVENOX) subcutaneous injection 40 mg  40 mg Subcutaneous Daily    insulin lispro (HumaLOG) 100 units/mL subcutaneous injection 1-5 Units  1-5 Units Subcutaneous TID AC    levothyroxine tablet 125 mcg  125 mcg Oral Daily    meclizine (ANTIVERT) tablet 25 mg  25 mg Oral Q8H PRN    metoprolol succinate (TOPROL-XL) 24 hr tablet 12 5 mg  12 5 mg Oral Daily    nitroglycerin (NITROSTAT) SL tablet 0 4 mg  0 4 mg Sublingual Q5 Min PRN    pantoprazole (PROTONIX) EC tablet 20 mg  20 mg Oral Early Morning    tamsulosin (FLOMAX) capsule 0 4 mg  0 4 mg Oral Daily With Dinner    and PTA meds:   Prior to Admission Medications   Prescriptions Last Dose Informant Patient Reported? Taking? ONE TOUCH ULTRA TEST test strip   No Yes   Sig: CHECK BLOOD SUGAR DAILY *DX: E11 9   aspirin (ASPIRIN LOW DOSE) 81 MG tablet 1/1/2019 at Unknown time  Yes Yes   Sig: Take 1 tablet by mouth daily   atorvastatin (LIPITOR) 10 mg tablet 1/1/2019 at Unknown time  No Yes   Sig: TAKE 1 TABLET DAILY     cyanocobalamin (VITAMIN B-12) 1,000 mcg tablet 1/1/2019 at Unknown time  Yes Yes   Sig: Take by mouth   glipiZIDE (GLUCOTROL) 10 mg tablet 1/1/2019 at Unknown time  No Yes   Sig: Take 2 tablets (20 mg total) by mouth 2 (two) times a day   glucose blood (ONE TOUCH ULTRA TEST) test strip   Yes Yes   Sig: by In Vitro route   glucose blood test strip   Yes Yes   levothyroxine 125 mcg tablet 1/1/2019 at Unknown time  No Yes   Sig: Take 1 tablet (125 mcg total) by mouth daily   meclizine (ANTIVERT) 12 5 MG tablet 1/2/2019 at Unknown time  No Yes   Sig: Take 1 tablet (12 5 mg total) by mouth every 8 (eight) hours as needed for dizziness   metFORMIN (GLUCOPHAGE-XR) 500 mg 24 hr tablet 1/1/2019 at Unknown time  No Yes   Sig: Take 1 tablet (500 mg total) by mouth 2 (two) times a day with meals   metoprolol succinate (TOPROL-XL) 25 mg 24 hr tablet 1/1/2019 at Unknown time  Yes Yes   Sig: Take 0 5 tablets by mouth daily   nateglinide (STARLIX) 120 mg tablet 1/1/2019 at Unknown time  No Yes   Sig: TAKE 1 TABLET THREE TIMES A DAY BEFORE MEALS   nitroglycerin (NITROSTAT) 0 4 mg SL tablet Unknown at Unknown time  Yes No   Sig: Place 1 tablet under the tongue every 5 (five) minutes as needed   omeprazole (PRILOSEC) 20 mg delayed release capsule 1/1/2019 at Unknown time  No Yes   Sig: Take 1 capsule (20 mg total) by mouth daily   pioglitazone (ACTOS) 45 mg tablet 1/1/2019 at Unknown time  Yes Yes   Sig: Take 1 tablet by mouth daily   polyethylene glycol (MIRALAX) 17 g packet Unknown at Unknown time  Yes No   potassium-sodium phosphateS (PHOSPHA 250 NEUTRAL) 155-852-130 mg tablet Unknown at Unknown time  Yes No   tamsulosin (FLOMAX) 0 4 mg Unknown at Unknown time  No No   Sig: Take 1 capsule (0 4 mg total) by mouth daily with dinner for 30 days      Facility-Administered Medications: None       No Known Allergies    Objective   Vitals:Blood pressure 119/66, pulse 66, temperature 98 3 °F (36 8 °C), temperature source Oral, resp  rate 18, height 5' 7" (1 702 m), weight 69 6 kg (153 lb 7 oz), SpO2 96 %  ,Body mass index is 24 03 kg/m²  Intake/Output Summary (Last 24 hours) at 01/03/19 0826  Last data filed at 01/03/19 0655   Gross per 24 hour   Intake              510 ml   Output              700 ml   Net             -190 ml       Invasive Devices:    Invasive Devices     Peripheral Intravenous Line            Peripheral IV 12/05/18 Left Forearm 29 days    Peripheral IV 01/03/19 Left Arm less than 1 day                Physical Exam   Constitutional: He is oriented to person, place, and time  He appears well-developed and well-nourished  HENT:   Head: Normocephalic and atraumatic  Eyes: Pupils are equal, round, and reactive to light  Conjunctivae and EOM are normal    Neck: Normal range of motion  Neck supple  Cardiovascular: Normal rate and regular rhythm  Pulmonary/Chest: Effort normal and breath sounds normal    Abdominal: Soft  Bowel sounds are normal    Neurological: He is alert and oriented to person, place, and time  He has an abnormal Heel to [employer] (Ataxic bilaterally)  He has a normal Finger-Nose-Finger Test    Reflex Scores:       Tricep reflexes are 2+ on the right side and 2+ on the left side  Bicep reflexes are 2+ on the right side and 2+ on the left side  Brachioradialis reflexes are 2+ on the right side and 2+ on the left side  Patellar reflexes are 1+ on the right side and 1+ on the left side  Achilles reflexes are 0 on the right side and 0 on the left side  Skin: Skin is warm and dry  Psychiatric: His speech is normal      Neurologic Exam     Mental Status   Oriented to person, place, and time  Follows 2 step commands  Attention: normal  Concentration: normal    Speech: speech is normal   Able to read  Able to repeat  Normal comprehension  Cranial Nerves     CN II   Visual fields full to confrontation  CN III, IV, VI   Pupils are equal, round, and reactive to light  Extraocular motions are normal    Nystagmus: bilateral   Nystagmus type: horizontal  Conjugate gaze: present    CN V   Facial sensation intact  CN VII   Right facial weakness: Minimal flattening of R NL Fold  CN VIII   CN VIII normal      CN IX, X   CN IX normal    CN X normal      CN XI   CN XI normal      CN XII   CN XII normal    Restricted endgaze to the R and L with both eyes  Some proptosis/protrusion of R eye noted compared to L        Motor Exam   Muscle bulk: decreased (In LE, symmertric)  Overall muscle tone: normal  Right arm pronator drift: absent  Left arm pronator drift: absent  5/5 UE and LE strength throughout  No motor focality or laterality on exam       Sensory Exam   Light touch normal    Right leg vibration: decreased from knee  Left leg vibration: decreased from knee  Right leg proprioception: decreased from ankle  Left leg proprioception: decreased from ankle  Temperature sensation intact throughout  PP not tested  Gait, Coordination, and Reflexes     Gait  Gait: (ataxic)    Coordination   Finger to nose coordination: normal  Heel to shin coordination: abnormal (Ataxic bilaterally)    Tremor   Resting tremor: absent  Intention tremor: absent    Reflexes   Right brachioradialis: 2+  Left brachioradialis: 2+  Right biceps: 2+  Left biceps: 2+  Right triceps: 2+  Left triceps: 2+  Right patellar: 1+  Left patellar: 1+  Right achilles: 0  Left achilles: 0  Right plantar: upgoing  Left plantar: normal  Right ankle clonus: absent  Left ankle clonus: absent  Room spinning dizziness going from lying to sitting in bed  No gross truncal ataxia while seated  No nystagmus with Alexia Bony         Lab Results:   CBC:   Results from last 7 days  Lab Units 01/03/19  0441 01/02/19  1716 01/02/19  1407   WBC Thousand/uL 6 84  --  8 53   RBC Million/uL 4 12  --  4 35   HEMOGLOBIN g/dL 12 5  --  13 1   HEMATOCRIT % 38 5  --  40 6   MCV fL 93  --  93   PLATELETS Thousands/uL 250 261 285   , BMP/CMP:   Results from last 7 days  Lab Units 01/03/19  0441 01/02/19  1407   SODIUM mmol/L 140 140   POTASSIUM mmol/L 4 3 4 3   CHLORIDE mmol/L 105 104   CO2 mmol/L 28 28   BUN mg/dL 18 19   CREATININE mg/dL 0 92 0 95   CALCIUM mg/dL 9 5 9 6   AST U/L  --  19   ALT U/L  --  22   ALK PHOS U/L  --  73   EGFR ml/min/1 73sq m 74 71   , Vitamin B12:   , HgBA1C:   , TSH:   , Coagulation:   Results from last 7 days  Lab Units 01/02/19  1407   INR  1 00   , Lipid Profile:   , Ammonia:   , Urinalysis:   Results from last 7 days  Lab Units 01/02/19  1721   COLOR UA  Yellow   CLARITY UA  Clear   SPEC GRAV UA  1 015   PH UA  7 0   LEUKOCYTES UA  Negative   NITRITE UA  Negative   GLUCOSE UA mg/dl Negative   KETONES UA mg/dl 15 (1+)*   BILIRUBIN UA  Negative   BLOOD UA  Negative   , Drug Screen:   , Medication Drug Levels:       Invalid input(s): CARBAMAZEPINE,  PHENOBARB, LACOSAMIDE, OXCARBAZEPINE  Imaging Studies: I have personally reviewed pertinent films in PACS   CT head 1/2/2019: No acute intracranial abnormality      EKG, Pathology, and Other Studies: I have personally reviewed pertinent reports  VTE Prophylaxis: Sequential compression device (Venodyne)  and Enoxaparin (Lovenox)    Code Status: Level 1 - Full Code    Counseling / Coordination of Care  Total time spent today 55 minutes  Greater than 50% of total time was spent with the patient and / or family counseling and / or coordination of care   A description of the counseling / coordination of care: Discussed plan of care with patient and primary team

## 2019-01-03 NOTE — OCCUPATIONAL THERAPY NOTE
Occupational Therapy Evaluation        Patient Name: Renetta Desir  Today's Date: 1/3/2019     01/03/19 1215   Note Type   Note type Eval only   Restrictions/Precautions   Weight Bearing Precautions Per Order No   Braces or Orthoses Other (Comment)  (none at baseline)   Other Precautions Chair Alarm; Bed Alarm; Fall Risk;Telemetry   Pain Assessment   Pain Assessment No/denies pain   Pain Score No Pain   Home Living   Type of [address] One level;Performs ADLs on one level; Able to live on main level with bedroom/bathroom  (2 RAGHAVENDRA)   Bathroom Shower/Tub Tub/shower unit   Bathroom Toilet Standard   Bathroom Equipment Shower chair;Grab bars in shower   [address] Walker;Cane;Wheelchair-manual  (ambulatory with [facility])   Prior Function   Level of [place] Independent with ADLs and functional mobility   Lives With Significant other   Receives Help From Family;Home health   ADL Assistance Independent   IADLs Independent   Falls in the last 6 months 1 to 4   Vocational Retired   Lifestyle   Autonomy Patient reporting indepependent with ADLs/ IADLs  Patient lives with a  friend (significant other)  in a one story house 2 [address], Patient drives, ambulates with  SPC, manages his affairs independently  Psychosocial   Psychosocial (WDL) WDL   ADL   Eating Assistance 5  Supervision/Setup   Grooming Assistance 5  Supervision/Setup   UB Bathing Assistance 5  Supervision/Setup   LB Bathing Assistance 4  Minimal Assistance   UB Dressing Assistance 4  Minimal Assistance   LB Dressing Assistance 4  Minimal [address]  4  [address] [facility] 4  Minimal Assistance   Bed Mobility   Supine to Sit 4  Minimal assistance   Additional items Assist x 1;HOB elevated; Increased time required; Bedrails;LE management  (log roll technique)   Additional Comments pt reports improvement in dizziness, although reports such with transitional movements  Pt able to turn head towards clock without LOB  Transfers   Sit to Stand 4  Minimal assistance   Additional items Assist x 2;Armrests; Increased time required;Verbal cues   Stand to Sit 4  Minimal assistance   Additional items Assist x 2;Armrests; Increased time required;Verbal cues   Functional Mobility   Functional Mobility 3  Moderate assistance   Balance   Static Sitting Fair   Dynamic Sitting Fair -   Static Standing Fair -   Dynamic Standing Poor +   Activity Tolerance   Activity Tolerance Patient limited by fatigue;Treatment limited secondary to medical complications (Comment)  (dizziness)   Nurse Made Aware RN Monica Farrell verbalized pt appropriate to see, made aware of session outcome/recs   RUE Assessment   RUE Assessment WFL   LUE Assessment   LUE Assessment WFL   Hand Function   Gross Motor Coordination Functional   Fine Motor Coordination Functional   Sensation   Light Touch Partial deficits in the RLE;Partial deficits in the LLE  (bilateral ankles)   Vision-Basic Assessment   Current Vision Wears glasses all the time   Cognition   Overall Cognitive Status [facility]   Arousal/Participation Alert; Responsive; Cooperative   Attention Within functional limits   Orientation Level Oriented X4   Memory Within functional limits   Following Commands Follows all commands and directions without difficulty   Assessment   Limitation Decreased ADL status; Decreased endurance;Decreased self-care trans;Decreased high-level ADLs   Prognosis Good   Assessment Patient is a 80 y o  male seen for OT evaluation s/p admit to [address] [place] [address] on 1/2/2019 w/Dizziness  Pt presents with dizziness, c/o vertigo x2 days  Commorbidities affecting patient's functional performance at time of assessment include:BRBPR, chest tightness/pressure, cholecystitis, DM, lyme disease, SOB, vertigo, BPH, hypothyroidism   Orders placed for OT evaluation and treatment   Performed at least two patient identifiers during session including name and wristband  Prior to admission, Patient reporting indepependent with ADLs/ IADLs  Patient lives with a  friend (significant other)  in a one story house 2 [address], Patient drives, ambulates with  SPC, manages his affairs independently  Personal factors affecting patient at time of initial evaluation include: decreased initiation and engagement, difficulty performing ADLs and difficulty performing IADLs  Upon evaluation, patient requires minimal  assist for UB ADLs, minimal  and moderate assist for LB ADLs, Occupational performance is affected by the following deficits: degenerative arthritic joint changes, dynamic sit/ stand balance deficit with poor standing tolerance time for self care and functional mobility, decreased activity tolerance, decreased safety awareness and postural control and postural alignment deficit, requiring external assistance to complete transitional movements  Therapist completed  extensive additional review of medical records and additional review of physical, cognitive or psychosocial history, clinical examination identifying 5 or more performance deficits, clinical decision making of a high complexity , consistent with a high complexity level evaluation  Patient to benefit from continued Occupational Therapy treatment while in the hospital to address deficits as defined above and maximize level of functional independence with ADLs and functional mobility  Occupational Performance areas to address include: grooming , bathing/ shower, dressing, toilet hygiene, transfer to all surfaces, functional mobility, emergency response, health maintenance, medication routine/ management, IADLS: Household maintenance, IADLs: safety procedures, IADLs: meal prep/ clean up, Leisure Participation and Social participation  From OT standpoint, recommendation at time of d/c would be Short Term Rehab  Plan   Treatment Interventions ADL retraining;Functional transfer training; Endurance training;Patient/family training;Equipment evaluation/education; Compensatory technique education; Energy conservation; Activityengagement   Goal Expiration Date 01/17/19   OT Frequency 2-3x/wk   Recommendation   OT Discharge Recommendation Short Term Rehab   Barthel Index   Feeding 10   Bathing 0   Grooming Score 0   Dressing Score 5   Bladder Score 10   Bowels Score 10   Toilet Use Score 5   Transfers (Bed/Chair) Score 10   Mobility (Level Surface) Score 0   Stairs Score 0   Barthel Index Score 50   Modified Citrus Scale   Modified Citrus Scale 4     Occupational Therapy goals: In 7-14 days:     1- Patient will verbalize and demonstrate use of energy conservation/ deep breathing technique and work simplification skills during functional activity with no verbal cues  2-Patient will verbalize and demonstrate good body mechanics and joint protection techniques during  ADLs/ IADLs with no verbal cues  3- Patient will increase OOB/ sitting tolerance to 2-4 hours per day for increased participation in self care and leisure tasks with no s/s of exertion  4-Patient will increase standing tolerance time to 5  minutes with unilateral UE support to complete sink level ADLs@ mod I level   5- Patient will increase sitting tolerance at edge of bed to 20 minutes to complete UB ADLs @ set up assist level  6- Patient will transfer bed to Chair / toilet at Set up assist level with AD as indicated  7- Patient will complete UB ADLs with set up assist  8- Patient will complete LB ADLs with min assist with the use of adaptive equipment  9- Patient will complete toileting hygiene with set up assist/ supervision for thoroughness    10-Patient/ Family  will demonstrate competency with UE Home Exercise Program

## 2019-01-03 NOTE — PROGRESS NOTES
[facility] Internal Medicine Progress Note  Patient: Blaire Quintero 80 y o  male   MRN: [id number]  PCP: Mari Adams MD  Unit/Bed#: -Tim Encounter: [id number]  Date Of Visit: 19    Assessment:    Principal Problem:    Dizziness  Active Problems:    Hypothyroidism    Diabetes mellitus, type 2 ([address] )    Benign prostatic hyperplasia    DDD (degenerative disc disease), lumbar      Plan:    Dizziness likely BPPV  MRI of the brain ordered to rule out central etiology  Check orthostatic  PT/OT/vestibular rehab    Hypothyroidism  Continue sent    BPH  Continue Flomax    Type 2 diabetes  A1c 11 3% as of 2018  Add long-acting insulin continue sliding scale  Continue to monitor sugars      VTE Pharmacologic Prophylaxis:   Pharmacologic: Heparin  Mechanical VTE Prophylaxis in Place: Yes    Patient Centered Rounds: I have performed bedside rounds with nursing staff today  Discussions with Specialists or Other Care Team Provider:  Case management  Education and Discussions with Family / Patient:  Patient    Time Spent for Care: 20 minutes  More than 50% of total time spent on counseling and coordination of care as described above  Current Length of Stay: 1 day(s)    Current Patient Status: Inpatient   Certification Statement: The patient will continue to require additional inpatient hospital stay due to MRI of the brain    Discharge Plan:  Continue to monitor clinically    Code Status: Level 1 - Full Code      Subjective:   Patient seen and examined  He feels dizzy and off balance when he is attempting to stand and turn his head  No nausea or vomiting  No hearing problems  Objective:     Vitals:   Temp (24hrs), Av 9 °F (36 6 °C), Min:97 5 °F (36 4 °C), Max:98 3 °F (36 8 °C)    Temp:  [97 5 °F (36 4 °C)-98 3 °F (36 8 °C)] 98 3 °F (36 8 °C)  HR:  [66-83] 66  Resp:  [18-19] 18  BP: (113-153)/(58-66) 119/66  SpO2:  [96 %-97 %] 96 %  Body mass index is 24 03 kg/m²       Input and Output Summary (last 24 hours): Intake/Output Summary (Last 24 hours) at 01/03/19 1514  Last data filed at 01/03/19 1440   Gross per 24 hour   Intake              190 ml   Output             1025 ml   Net             -835 ml       Physical Exam:     Alert oriented x3  Lungs are clear to auscultation  Heart sounds are regular S1-S2  Abdomen soft nontender  Extremities no edema      Additional Data:     Labs:      Results from last 7 days  Lab Units 01/03/19  0441  01/02/19  1407   WBC Thousand/uL 6 84  --  8 53   HEMOGLOBIN g/dL 12 5  --  13 1   HEMATOCRIT % 38 5  --  40 6   PLATELETS Thousands/uL 250  < > 285   NEUTROS PCT %  --   --  60   LYMPHS PCT %  --   --  25   MONOS PCT %  --   --  9   EOS PCT %  --   --  4   < > = values in this interval not displayed  Results from last 7 days  Lab Units 01/03/19  0441 01/02/19  1407   POTASSIUM mmol/L 4 3 4 3   CHLORIDE mmol/L 105 104   CO2 mmol/L 28 28   BUN mg/dL 18 19   CREATININE mg/dL 0 92 0 95   CALCIUM mg/dL 9 5 9 6   ALK PHOS U/L  --  73   ALT U/L  --  22   AST U/L  --  19       Results from last 7 days  Lab Units 01/02/19  1407   INR  1 00       * I Have Reviewed All Lab Data Listed Above  * Additional Pertinent Lab Tests Reviewed:  [facility] Admission Reviewed    Imaging:    Imaging Reports Reviewed Today Include:  CT head  Imaging Personally Reviewed by Myself Includes:      Recent Cultures (last 7 days):           Last 24 Hours Medication List:     Current Facility-Administered Medications:  [START ON 1/4/2019] aspirin 81 mg Oral Daily Ani Sinclair MD   atorvastatin 10 mg Oral Daily Ani Sinclair MD   enoxaparin 40 mg Subcutaneous Daily Ani Sinclair MD   insulin glargine 20 Units Subcutaneous QAM Sharad Franklin MD   insulin lispro 1-5 Units Subcutaneous TID AC Ani Sinclair MD   levothyroxine 125 mcg Oral Daily Ani Sinclair MD   meclizine 25 mg Oral Q8H PRN Ani Sinclair MD   metoprolol succinate 12 5 mg Oral Daily Ani Sinclair MD   nitroglycerin 0 4 mg Sublingual Q5 Min PRN Blayne Merritt MD   pantoprazole 20 mg Oral Early Morning Blayne Merritt MD   tamsulosin 0 4 mg Oral Daily With Noel Burnham MD        Today, Patient Was Seen By: Luis Enrique Kramer MD    ** Please Note: Dragon 360 Dictation voice to text software may have been used in the creation of this document   **

## 2019-01-03 NOTE — UTILIZATION REVIEW
Initial Clinical Review    Admission: Date/Time/Statement: 1/2/19 @ 1507     Orders Placed This Encounter   Procedures    Inpatient Admission (expected length of stay for this patient is greater than two midnights)     Standing Status:   Standing     Number of Occurrences:   1     Order Specific Question:   Admitting Physician     Answer:   Stanislaw Farah [[id number]]     Order Specific Question:   Level of Care     Answer:   Med Surg [[id number]]     Order Specific Question:   Estimated length of stay     Answer:   More than 2 Midnights     Order Specific Question:   Certification     Answer:   I certify that inpatient services are medically necessary for this patient for a duration of greater than two midnights  See H&P and MD Progress Notes for additional information about the patient's course of treatment  ED: Date/Time/Mode of Arrival:   ED Arrival Information     Expected Arrival Acuity Means of Arrival Escorted By Service Admission Type    - 1/2/2019 13:22 Urgent Ambulance [facility] Hospitalist Urgent    Arrival Complaint    Dizziness          Chief Complaint:   Chief Complaint   Patient presents with    Dizziness     Pt c/o vertigo x 2 days  Pt sts he took meclizine this morning and had some relief  Pt denies any chest pain/SOB, +vomiting x2        History of Illness: 81 yo male to ED from home via EMS w/ c/o dizziness , N/V , felt like the room was spinning , nausea and vomited x2       ED Vital Signs:   ED Triage Vitals [01/02/19 1328]   Temperature Pulse Respirations Blood Pressure SpO2   98 °F (36 7 °C) 80 18 168/70 97 %      Temp Source Heart Rate Source Patient Position - Orthostatic VS BP Location FiO2 (%)   Axillary Monitor Lying Right arm --      Pain Score       No Pain        Wt Readings from Last 1 Encounters:   01/02/19 69 6 kg (153 lb 7 oz)       Vital Signs (abnormal):  wnl     Abnormal Labs/Diagnostic Test Results: gluc 162, alb  2 9  CXR -wnl   CT head- wnl   EKG- NSR w/ PVCs RBBB    ED Treatment:   Medication Administration from 01/02/2019 1322 to 01/02/2019 1658     None          Past Medical/Surgical History: Active Ambulatory Problems     Diagnosis Date Noted    CAD (coronary artery disease) 02/19/2018    Hyperlipidemia 02/19/2018    Hypothyroidism 02/19/2018    Diabetes mellitus, type 2 ([facility] [address] ) 02/19/2018    Chronic cough 02/19/2018    Acid reflux disease 02/19/2018    Benign prostatic hyperplasia 11/17/2014    DDD (degenerative disc disease), lumbar 11/17/2014    Diverticulosis 11/17/2014    UTI (urinary tract infection) 12/04/2018    Dizziness 12/04/2018     Past Medical History:   Diagnosis Date    BRBPR (bright red blood per rectum)     Chest tightness or pressure     Cholecystitis     Diabetes mellitus (HCC)     Lyme disease     Shortness of breath     Vertigo        Admitting Diagnosis: Dizziness [R42]    Age/Sex: 80 y o  male    Assessment/Plan: Plan for the Primary Problem(s):  · 1  Dizziness- CT head negative  Will give meclizine prn  Neurology to evaluate  · 2  DM- on ISS  · 3  BPH- on flomax  · 4  Hypothyroidism- on levothyroxine  ?     Anticipated Length of Stay:  Patient will be admitted on an Inpatient basis with an anticipated length of stay of  More than 2 midnights     Justification for Hospital Stay: dizziness    Admission Orders:  Scheduled Meds:   Current Facility-Administered Medications:  aspirin 81 mg Oral Daily    atorvastatin 10 mg Oral Daily    enoxaparin 40 mg Subcutaneous Daily    insulin glargine 20 Units Subcutaneous QAM    insulin lispro 1-5 Units Subcutaneous TID AC    levothyroxine 125 mcg Oral Daily    meclizine 25 mg Oral Q8H PRN    metoprolol succinate 12 5 mg Oral Daily    nitroglycerin 0 4 mg Sublingual Q5 Min PRN    pantoprazole 20 mg Oral Early Morning    tamsulosin 0 4 mg Oral Daily With Dinner        Orthostatic BP   Fingerstick ac and hs   Cons carb diet   BRP   Act as demetra   Neuro consult   OT PT eval   1/3 bmp, cbc Gluc  214, 198, 301    Neuro consult  1/3  Plan:  1  Dizziness: description and clinical presentation consistent with peripheral/inner ear pathology as origin of symptoms  -CT head negative for acute intracranial pathology   -With proptosis and gaze restriction on exam (and to rule out central causes of dizziness) will recommend getting MRI brain and orbits to rule out acute intracranial pathology  -TSH in early December 2018 within normal limits  -PT/OT following               -Recommend vestibular rehab as outpatient once medically stable for discharge   Epley maneuver education                -Meclizine/antiemetics as needed for symptoms    2  Medical management per primary team

## 2019-01-03 NOTE — SOCIAL WORK
LOS 1  LACE 74  Patient is a 30 day readmission for unrelated readmission  CM met with pt at bedside to discuss DCP  Per recent CM open "Pt reports he is currently living in [facility] with a friend Hugo Sandoval in a one story home with 3 [address]A  He reports he has a cane, quad cane and walker at home  He has had prior VNA but is unsure of the agency and no hx of STR  He reports he typically drives himself to appts or he has a neighbor who assists when he is unable to drive "    CM discussed with pt recommendation for STR at d/c - per pt he does not want to go to [facility] [place] or [place]  He prefers to go home with VNA if able to if STR is necessary he would consider [facility] or [facility]  Referral placed in [place]  Discussed with RN and SLIM  CM will continue to follow  CM reviewed discharge planning process including the following: identifying caregivers at home, preference for d/c planning needs, availability of treatment team to discuss questions or concerns patient and/or family may have regarding diagnosis, plan of care, old or new medications and discharge planning  Discharge Checklist reviewed and CM will continue to monitor for progress toward discharge goals in nursing and provider rounds

## 2019-01-03 NOTE — PHYSICIAN ADVISOR
Current patient class: Inpatient  The patient is currently on Hospital Day: 2 at [address]        The patient was admitted to the hospital  on 1/2/19 at [phone] for the following diagnosis:  Dizziness [R42]       There is documentation in the medical record of an expected length of stay of at least 2 midnights  The patient is therefore expected to satisfy the 2 midnight benchmark and given the 2 midnight presumption is appropriate for INPATIENT ADMISSION  Given this expectation of a satisfying stay, CMS instructs us that the patient is most often appropriate for inpatient admission under part A provided medical necessity is documented in the chart  After review of the relevant documentation, labs, vital signs and test results, the patient is appropriate for INPATIENT ADMISSION  Admission to the hospital as an inpatient is a complex decision making process which requires the practitioner to consider the patients presenting complaint, history and physical examination and all relevant testing  With this in mind, in this case, the patient was deemed appropriate for INPATIENT ADMISSION  After review of the documentation and testing available at the time of the admission I concur with this clinical determination of medical necessity  The patient does have an inpatient admission within the previous 30 days  The patient was admitted on 12/4/18 and discharged on 12/6/18 as an inpatient  At that time, the patient was admitted with a UTI and associated dizziness  The patient therefore required readmission review  In this case the patient should be considered a SEPARATE and UNRELATED INPATIENT ADMISSION  The patient had been discharged in stable condition with a completed care plan  There were no unresolved acute medical issues at the time of discharged which would have reasonably been expected to prompt this readmission  Rationale is as follows:     The patient is a 80 yrs male with previous diagnosis of vertigo who presented to the ED secondary to dizziness described as room spinning with associated nausea and vomiting as well as blurry vision  The patient was found to have reproduction of symptoms with head movement  He was treated with meclizine and IVF hydration however unsteady with ambulation and no resolution of sxms with medication  He therefore also had a CT with concern for vertebral basilar insufficiency and possible brain stem stroke and admitted for further evaluation and treatment  Upon admission, the patient has consultations to neurology with recommendation for MRI as the patient was found to have gaze restriction on exam and to rule out central cause of dizziness  The patient also requires PT/OT and vestibular rehab  Given the need for further imaging, evaluation and treatment of the patient's dizziness with MRI and continued neurological evaluation, he is currently appropriate for INPATIENT ADMISSION  Furthermore, given his primary admitting diagnosis previously was an acute UTI, the current admission should be considered [facility] [employer]          The patients vitals on arrival were ED Triage Vitals [01/02/19 1328]   Temperature Pulse Respirations Blood Pressure SpO2   98 °F (36 7 °C) 80 18 168/70 97 %      Temp Source Heart Rate Source Patient Position - Orthostatic VS BP Location FiO2 (%)   Axillary Monitor Lying Right arm --      Pain Score       No Pain           Past Medical History:   Diagnosis Date    BRBPR (bright red blood per rectum)     Last Assessed: 1/11/2016    Chest tightness or pressure     Last Assessed: 3/27/2014    Cholecystitis     Last Assessed: 9/1/2015    Diabetes mellitus ([facility] [address] )     Lyme disease     Shortness of breath     Last Assessed: 3/27/2014    Vertigo     Last Assessed: 12/31/2015     Past Surgical History:   Procedure Laterality Date    CATARACT EXTRACTION      Last Assessed: 11/11/2016    HERNIA REPAIR      LAPAROSCOPIC CHOLECYSTECTOMY      TONSILLECTOMY             Consults have been placed to:   IP CONSULT TO NEUROLOGY    Vitals:    01/02/19 1647 01/02/19 1658 01/02/19 2300 01/03/19 0655   BP: 136/63 153/63 113/58 119/66   BP Location: Right arm Right arm Right arm Right arm   Pulse: 83 78 74 66   Resp:  19 18 18   Temp:  97 5 °F (36 4 °C) 98 °F (36 7 °C) 98 3 °F (36 8 °C)   TempSrc:  Oral Oral Oral   SpO2: 96% 97% 97% 96%   Weight:  69 6 kg (153 lb 7 oz)     Height:  5' 7" (1 702 m)         Most recent labs:    Recent Labs      01/02/19   1407   01/03/19   0441   WBC  8 53   --   6 84   HGB  13 1   --   12 5   HCT  40 6   --   38 5   PLT  285   < >  250   K  4 3   --   4 3   CALCIUM  9 6   --   9 5   BUN  19   --   18   CREATININE  0 95   --   0 92   INR  1 00   --    --    TROPONINI  <0 02   --    --    AST  19   --    --    ALT  22   --    --    ALKPHOS  73   --    --     < > = values in this interval not displayed         Scheduled Meds:  Current Facility-Administered Medications:  [START ON 1/4/2019] aspirin 81 mg Oral Daily Oriana Stewart MD   atorvastatin 10 mg Oral Daily Oriana Stewart MD   enoxaparin 40 mg Subcutaneous Daily Oriana Stewart MD   insulin glargine 20 Units Subcutaneous QAM Lesa Spencer MD   insulin lispro 1-5 Units Subcutaneous TID AC Oriana Stewart MD   levothyroxine 125 mcg Oral Daily Oriana Stewart MD   meclizine 25 mg Oral Q8H PRN Oriana Stewart MD   metoprolol succinate 12 5 mg Oral Daily Oriana Stewart MD   nitroglycerin 0 4 mg Sublingual Q5 Min PRN Oriana Stewart MD   pantoprazole 20 mg Oral Early Morning Oriana Stewart MD   tamsulosin 0 4 mg Oral Daily With Gladys Hummel MD     Continuous Infusions:   PRN Meds: meclizine    nitroglycerin    Surgical procedures (if appropriate):

## 2019-01-03 NOTE — PLAN OF CARE
Problem: PHYSICAL THERAPY ADULT  Goal: Performs mobility at highest level of function for planned discharge setting  See evaluation for individualized goals  Treatment/Interventions: Functional transfer training, LE strengthening/ROM, Elevations, Therapeutic exercise, Endurance training, Patient/family training, Equipment eval/education, Bed mobility, Gait training, Spoke to MD, Spoke to nursing, OT  Equipment Recommended:  (TBD)       See flowsheet documentation for full assessment, interventions and recommendations  Prognosis: Good  Problem List: Decreased strength, Decreased endurance, Impaired balance, Decreased mobility, Decreased safety awareness, Impaired sensation  Assessment: Pt is 80 y o  male seen for PT evaluation on 1/3/2019 s/p admit to [facility] on 1/2/2019 w/ Dizziness  Pt presents with dizziness, c/o vertigo x2 days  PT consulted to assess pt's functional mobility and d/c needs  Order placed for PT eval and tx, w/ up w/ A and activity as tolerated order  Performed at least 2 patient identifiers during session: Name and wristband  Comorbidities affecting pt's physical performance at time of assessment include: BRBPR, chest tightness/pressure, cholecystitis, DM, lyme disease, SOB, vertigo, BPH, hypothyroidism  PTA, pt was independent w/ all functional mobility w/ SPC, ambulates unrestricted distances and all terrain, has 2 RAGHAVENDRA, lives w/ S O  in 1 level home and retired  Personal factors affecting pt at time of IE include: inaccessible home environment, stairs to enter home, inability to ambulate household distances, inability to navigate level surfaces w/o external assistance, unable to perform dynamic tasks in community, positive fall history and limited insight into impairments   Please find objective findings from PT assessment regarding body systems outlined above with impairments and limitations including weakness, impaired balance, decreased endurance, gait deviations, decreased activity tolerance, decreased safety awareness and fall risk, as well as mobility assessment (need for min x1-2 assist w/ all phases of mobility when usually ambulating independently and need for cueing for mobility technique)  The following objective measures performed on IE also reveal limitations: Barthel Index: 50/100 and Modified Randall: 4 (moderate/severe disability)  Pt's clinical presentation is currently unstable/unpredictable seen in pt's presentation of need for input for task focus and mobility technique, on telemetry monitoring, ongoing medical assessment and awaiting MRI to r/o acute intracranial pathology  Pt to benefit from continued PT tx to address deficits as defined above and maximize level of functional independent mobility and consistency  From PT/mobility standpoint, recommendation at time of d/c would be STR pending progress in order to facilitate return to PLOF  Barriers to Discharge: Inaccessible home environment, Decreased caregiver support     Recommendation: Short-term skilled PT     PT - OK to Discharge: Yes (when medically cleared if to STR)    See flowsheet documentation for full assessment

## 2019-01-03 NOTE — PHYSICAL THERAPY NOTE
Physical Therapy Evaluation     Patient's Name: Summer Ardno    Admitting Diagnosis  Dizziness [R42]    Problem List  Patient Active Problem List   Diagnosis    CAD (coronary artery disease)    Hyperlipidemia    Hypothyroidism    Diabetes mellitus, type 2 ([facility] )    Chronic cough    Acid reflux disease    Benign prostatic hyperplasia    DDD (degenerative disc disease), lumbar    Diverticulosis    UTI (urinary tract infection)    Dizziness       Past Medical History  Past Medical History:   Diagnosis Date    BRBPR (bright red blood per rectum)     Last Assessed: 1/11/2016    Chest tightness or pressure     Last Assessed: 3/27/2014    Cholecystitis     Last Assessed: 9/1/2015    Diabetes mellitus ([facility] )     Lyme disease     Shortness of breath     Last Assessed: 3/27/2014    Vertigo     Last Assessed: 12/31/2015       Past Surgical History  Past Surgical History:   Procedure Laterality Date    CATARACT EXTRACTION      Last Assessed: 11/11/2016    HERNIA REPAIR      LAPAROSCOPIC CHOLECYSTECTOMY      TONSILLECTOMY          01/03/19 1230   Note Type   Note type Eval only   Pain Assessment   Pain Assessment No/denies pain   Pain Score No Pain   Home Living   Type of [address] One level;Performs ADLs on one level; Able to live on main level with bedroom/bathroom;Stairs to enter with rails  (2 RAGHAVENDRA)   Bathroom Shower/Tub Tub/shower unit   Bathroom Toilet Standard   Bathroom Equipment Shower chair;Grab bars in shower   [address] Walker;Cane;Wheelchair-manual  (patient ambulatory with [facility] prior to admission)   Additional Comments Pt between his home and S O 's home  Set up above is significant other's where he was staying PTA  Pt reports his home set is a [facility] with a 1st floor set up  Prior Function   Level of [place] Independent with ADLs and functional mobility   Lives With Significant other  Marta Richards)   Receives Help From Friend(s); Home health ADL Assistance Independent   IADLs Independent   Falls in the last 6 months 1 to 4   Vocational Retired   Comments (+) driving   Restrictions/Precautions   [place] Bearing Precautions Per Order No   Braces or Orthoses (none per pt)   Other Precautions Chair Alarm; Bed Alarm; Fall Risk;Telemetry   General   Family/Caregiver Present No   Cognition   Overall Cognitive Status WFL   Arousal/Participation Alert   Orientation Level Oriented X4   Memory Within functional limits   Following Commands Follows all commands and directions without difficulty   Comments pt agreeable to PT evaluation   RUE Assessment   RUE Assessment (defer to OT eval for comments)   LUE Assessment   LUE Assessment (defer to OT eval for comments)   RLE Assessment   RLE Assessment X  (grossly 4/5)   LLE Assessment   LLE Assessment X  (grossly 4/5)   Coordination   Movements are Fluid and Coordinated 0   Sensation X  (pt reports N/T intermittent to distal LEs, mainly feet)   Light Touch   RLE Light Touch Grossly intact   LLE Light Touch Grossly intact   Bed Mobility   Supine to Sit 4  Minimal assistance   Additional items Assist x 1;HOB elevated; Bedrails; Increased time required;Verbal cues  (log roll technique)   Additional Comments pt reports improvement in dizziness, although reports such with transitional movements  Pt able to turn head towards clock without LOB  Transfers   Sit to Stand 4  Minimal assistance   Additional items Assist x 2;Armrests; Increased time required;Verbal cues   Stand to Sit 4  Minimal assistance   Additional items Assist x 2;Armrests; Increased time required;Verbal cues   Ambulation/Elevation   Gait pattern Wide PADDY; Decreased foot clearance; Short stride; Step to;Excessively slow   Gait Assistance 4  Minimal assist   Additional items Assist x 2;Verbal cues; Tactile cues   Assistive Device (HHA)   Distance 3' from EOB to stretcher   Stair Management Assistance Not tested   Balance   Static Sitting Fair  (tactile cues to align vertically)   Dynamic Sitting Fair -   Static Standing Fair -   Dynamic Standing Poor +   Ambulatory Poor +   Endurance Deficit   Endurance Deficit Yes   Activity Tolerance   Activity Tolerance Patient limited by fatigue; Other (Comment)  (dizziness)   William Organ Dr Narciso Ribeiro verbalized pt appropriate to see  OT Joanie Guzman  Nurse Made Aware JUSTIN Rothman verbalized pt appropriate to see, made aware of session outcome/recs   Assessment   Prognosis Good   Problem List Decreased strength;Decreased endurance; Impaired balance;Decreased mobility; Decreased safety awareness; Impaired sensation   Assessment Pt is 80 y o  male seen for PT evaluation on 1/3/2019 [facility] 1[facility] w/ Dizziness  Pt presents with dizziness, c/o vertigo x2 days  PT consulted to assess pt's functional mobility and d/c needs  Order placed for PT eval and tx, w/ up w/ A and activity as tolerated order  Performed at least 2 patient identifiers during session: Name and wristband  Comorbidities affecting pt's physical performance at time of assessment include: BRBPR, chest tightness/pressure, cholecystitis, DM, lyme disease, SOB, vertigo, BPH, hypothyroidism  PTA, pt was independent w/ all functional mobility w/ SPC, ambulates unrestricted distances and all terrain, has 2 RAGHAVENDRA, lives w/ S O  in 1 level home and retired  Personal factors affecting pt at time of IE include: inaccessible home environment, stairs to enter home, inability to ambulate household distances, inability to navigate level surfaces w/o external assistance, unable to perform dynamic tasks in community, positive fall history and limited insight into impairments   Please find objective findings from PT assessment regarding body systems outlined above with impairments and limitations including weakness, impaired balance, decreased endurance, gait deviations, decreased activity tolerance, decreased safety awareness and fall risk, as well as mobility assessment (need for min x1-2 assist w/ all phases of mobility when usually ambulating independently and need for cueing for mobility technique)  The following objective measures performed on IE also reveal limitations: Barthel Index: 50/100 and Modified Brielle: 4 (moderate/severe disability)  Pt's clinical presentation is currently unstable/unpredictable seen in pt's presentation of need for input for task focus and mobility technique, on telemetry monitoring, ongoing medical assessment and awaiting MRI to r/o acute intracranial pathology  Pt to benefit from continued PT tx to address deficits as defined above and maximize level of functional independent mobility and consistency  From PT/mobility standpoint, recommendation at time of d/c would be STR pending progress in order to facilitate return to PLOF  Barriers to Discharge Inaccessible home environment;Decreased caregiver support   Goals   Patient Goals to return home   STG Expiration Date 01/13/19   Short Term Goal #1 In 7-10 days: Increase bilateral LE strength 1/2 grade to facilitate independent mobility, Perform all bed mobility tasks modified independent to decrease caregiver burden, Perform all transfers modified independent to improve independence, Ambulate > 150 ft  with least restrictive assistive device modified independent w/o LOB and w/ normalized gait pattern 100% of the time, Navigate 2 stairs modified independent with unilateral handrail to facilitate return to previous living environment, Increase all balance 1/2 grade to decrease risk for falls, Complete exercise program independently, Tolerate 4 hr OOB to faciliate upright tolerance, Improve Barthel Index score to 65 or greater to facilitate independence and PT provider will perform functional balance assessment to determine fall risk   Treatment Day 0   Plan   Treatment/Interventions Functional transfer training;LE strengthening/ROM; Elevations; Therapeutic exercise; Endurance training;Patient/family training;Equipment eval/education; Bed mobility;Gait training;Spoke to MD;Spoke to nursing;OT   PT Frequency 5x/wk   Recommendation   Recommendation Short-term skilled PT   Equipment Recommended (TBD)   PT - OK to Discharge Yes  (when medically cleared if to STR)   Modified [place] Scale   Modified [place] Scale 4   Barthel Index   Feeding 10   Bathing 0   Grooming Score 0   Dressing Score 5   Bladder Score 10   Bowels Score 10   Toilet Use Score 5   Transfers (Bed/Chair) Score 10   Mobility (Level Surface) Score 0   Stairs Score 0   Barthel Index Score 50         Priyanka Patel, PT, DPT

## 2019-01-04 LAB
GLUCOSE SERPL-MCNC: 111 MG/DL (ref 65–140)
GLUCOSE SERPL-MCNC: 227 MG/DL (ref 65–140)
GLUCOSE SERPL-MCNC: 239 MG/DL (ref 65–140)
GLUCOSE SERPL-MCNC: 362 MG/DL (ref 65–140)
GLUCOSE SERPL-MCNC: 73 MG/DL (ref 65–140)

## 2019-01-04 PROCEDURE — 82948 REAGENT STRIP/BLOOD GLUCOSE: CPT

## 2019-01-04 PROCEDURE — 99232 SBSQ HOSP IP/OBS MODERATE 35: CPT | Performed by: INTERNAL MEDICINE

## 2019-01-04 RX ADMIN — ENOXAPARIN SODIUM 40 MG: 40 INJECTION SUBCUTANEOUS at 08:59

## 2019-01-04 RX ADMIN — INSULIN LISPRO 2 UNITS: 100 INJECTION, SOLUTION INTRAVENOUS; SUBCUTANEOUS at 09:00

## 2019-01-04 RX ADMIN — INSULIN LISPRO 4 UNITS: 100 INJECTION, SOLUTION INTRAVENOUS; SUBCUTANEOUS at 11:58

## 2019-01-04 RX ADMIN — PANTOPRAZOLE SODIUM 20 MG: 20 TABLET, DELAYED RELEASE ORAL at 05:09

## 2019-01-04 RX ADMIN — ATORVASTATIN CALCIUM 10 MG: 10 TABLET, FILM COATED ORAL at 09:00

## 2019-01-04 RX ADMIN — INSULIN GLARGINE 20 UNITS: 100 INJECTION, SOLUTION SUBCUTANEOUS at 08:59

## 2019-01-04 RX ADMIN — INSULIN LISPRO 2 UNITS: 100 INJECTION, SOLUTION INTRAVENOUS; SUBCUTANEOUS at 16:50

## 2019-01-04 RX ADMIN — ASPIRIN 81 MG: 81 TABLET, COATED ORAL at 09:00

## 2019-01-04 RX ADMIN — METOPROLOL SUCCINATE 12.5 MG: 25 TABLET, EXTENDED RELEASE ORAL at 09:00

## 2019-01-04 RX ADMIN — LEVOTHYROXINE SODIUM 125 MCG: 125 TABLET ORAL at 09:00

## 2019-01-04 RX ADMIN — TAMSULOSIN HYDROCHLORIDE 0.4 MG: 0.4 CAPSULE ORAL at 16:50

## 2019-01-04 NOTE — PLAN OF CARE
Problem: DISCHARGE PLANNING - CARE MANAGEMENT  Goal: Discharge to post-acute care or home with appropriate resources  INTERVENTIONS:  - Conduct assessment to determine patient/family and health care team treatment goals, and need for post-acute services based on payer coverage, community resources, and patient preferences, and barriers to discharge  - Address psychosocial, clinical, and financial barriers to discharge as identified in assessment in conjunction with the patient/family and health care team  - Arrange appropriate level of post-acute services according to patients   needs and preference and payer coverage in collaboration with the physician and health care team  - Communicate with and update the patient/family, physician, and health care team regarding progress on the discharge plan  - Arrange appropriate transportation to post-acute venues  Outcome: Progressing  LOS 1  LACE 74  Patient is a 30 day readmission for unrelated readmission  CM met with pt at bedside to discuss DCP  Per recent CM open "Pt reports he is currently living in [place] with a friend Josiah Krishnamurthy in a one story home with 3 RAGHAVENDRA  He reports he has a cane, quad cane and walker at home  He has had prior VNA but is unsure of the agency and no hx of STR  He reports he typically drives himself to appts or he has a neighbor who assists when he is unable to drive "    CM discussed with pt recommendation for STR at d/c - per pt he does not want to go to [facility] or [facility]  He prefers to go home with VNA if able to if STR is necessary he would consider [facility] or [facility]  Referral placed in [facility]  Discussed with RN and SLIM  CM will continue to follow      CM reviewed discharge planning process including the following: identifying caregivers at home, preference for d/c planning needs, availability of treatment team to discuss questions or concerns patient and/or family may have regarding diagnosis, plan of care, old or new medications and discharge planning  Discharge Checklist reviewed and CM will continue to monitor for progress toward discharge goals in nursing and provider rounds

## 2019-01-04 NOTE — SOCIAL WORK
Per Abhay Ruiz, CM pt is accepted at [facility] for tomorrow and denied by   Abhay Ruiz requested assistance to follow up with pt  CM met with pt at bedside to discuss STR  Pt is agreeable to PVM  Pt explained his friend, Hugo Sandoval may be moving out of state shortly to stay with family but he is open to rehab for 3 weeks  Pt is agreeable to transport via [facility] s/w [place] to request transport  CM awaiting cb

## 2019-01-05 VITALS
BODY MASS INDEX: 24.08 KG/M2 | HEIGHT: 67 IN | SYSTOLIC BLOOD PRESSURE: 110 MMHG | WEIGHT: 153.44 LBS | OXYGEN SATURATION: 92 % | DIASTOLIC BLOOD PRESSURE: 60 MMHG | TEMPERATURE: 97.4 F | RESPIRATION RATE: 18 BRPM | HEART RATE: 72 BPM

## 2019-01-05 LAB — GLUCOSE SERPL-MCNC: 124 MG/DL (ref 65–140)

## 2019-01-05 PROCEDURE — 82948 REAGENT STRIP/BLOOD GLUCOSE: CPT

## 2019-01-05 PROCEDURE — 99238 HOSP IP/OBS DSCHRG MGMT 30/<: CPT | Performed by: INTERNAL MEDICINE

## 2019-01-05 RX ORDER — INSULIN GLARGINE 100 [IU]/ML
20 INJECTION, SOLUTION SUBCUTANEOUS DAILY
Refills: 0
Start: 2019-01-05 | End: 2019-02-19

## 2019-01-05 RX ADMIN — ATORVASTATIN CALCIUM 10 MG: 10 TABLET, FILM COATED ORAL at 10:14

## 2019-01-05 RX ADMIN — METOPROLOL SUCCINATE 12.5 MG: 25 TABLET, EXTENDED RELEASE ORAL at 10:11

## 2019-01-05 RX ADMIN — PANTOPRAZOLE SODIUM 20 MG: 20 TABLET, DELAYED RELEASE ORAL at 05:29

## 2019-01-05 RX ADMIN — INSULIN GLARGINE 20 UNITS: 100 INJECTION, SOLUTION SUBCUTANEOUS at 10:19

## 2019-01-05 RX ADMIN — LEVOTHYROXINE SODIUM 125 MCG: 125 TABLET ORAL at 10:11

## 2019-01-05 RX ADMIN — ENOXAPARIN SODIUM 40 MG: 40 INJECTION SUBCUTANEOUS at 10:11

## 2019-01-05 RX ADMIN — ASPIRIN 81 MG: 81 TABLET, COATED ORAL at 10:11

## 2019-01-05 NOTE — PROGRESS NOTES
[facility] Progress Note  Patient: Margoth Yates 80 y o  male   MRN: [id number]  PCP: Merry Kumar MD  Unit/Bed#: -01 Encounter: [id number]  Date Of Visit: 19    Assessment:    Principal Problem:    Dizziness  Active Problems:    Hypothyroidism    Diabetes mellitus, type 2 ([address] )    Benign prostatic hyperplasia    DDD (degenerative disc disease), lumbar      Plan:    Dizziness likely secondary to BPPV  PT/OT  Vestibular rehab     Type 2 diabetes, uncontrolled A1c 11 6%  Continue Lantus  Patient on multiple oral hypoglycemic medications, will have to be adjusted on discharge  Close outpatient follow-up with PCP  Monitor and avoid hypoglycemia    BPh  Continue Flomax    VTE Pharmacologic Prophylaxis:   Pharmacologic: Heparin  Mechanical VTE Prophylaxis in Place: Yes    Patient Centered Rounds: I have performed bedside rounds with nursing staff today  Discussions with Specialists or Other Care Team Provider:  Neurology    Education and Discussions with Family / Patient:  Patient    Time Spent for Care: 20 minutes  More than 50% of total time spent on counseling and coordination of care as described above  Current Length of Stay: 3 day(s)    Current Patient Status: Inpatient   Certification Statement: The patient will continue to require additional inpatient hospital stay due to Placement    Discharge Plan:  Pending placement    Code Status: Level 1 - Full Code      Subjective:   Patient seen and examined  No complaints at this time  Pending placement    Objective:     Vitals:   Temp (24hrs), Av 8 °F (36 6 °C), Min:97 4 °F (36 3 °C), Max:98 3 °F (36 8 °C)    Temp:  [97 4 °F (36 3 °C)-98 3 °F (36 8 °C)] 97 4 °F (36 3 °C)  HR:  [58-76] 72  Resp:  [16-20] 18  BP: (110-127)/(58-63) 110/60  SpO2:  [92 %-97 %] 92 %  Body mass index is 24 03 kg/m²  Input and Output Summary (last 24 hours):        Intake/Output Summary (Last 24 hours) at 19 1027  Last data filed at 19 6842   Gross per 24 hour   Intake              420 ml   Output              550 ml   Net             -130 ml       Physical Exam:     Alert and oriented x3  Mucous membranes are moist  Lungs are clear to auscultation  Heart sounds are regular S1-S2  Abdomen soft nontender  Extremities no edema    Additional Data:     Labs:      Results from last 7 days  Lab Units 01/03/19  0441  01/02/19  1407   WBC Thousand/uL 6 84  --  8 53   HEMOGLOBIN g/dL 12 5  --  13 1   HEMATOCRIT % 38 5  --  40 6   PLATELETS Thousands/uL 250  < > 285   NEUTROS PCT %  --   --  60   LYMPHS PCT %  --   --  25   MONOS PCT %  --   --  9   EOS PCT %  --   --  4   < > = values in this interval not displayed  Results from last 7 days  Lab Units 01/03/19  0441 01/02/19  1407   POTASSIUM mmol/L 4 3 4 3   CHLORIDE mmol/L 105 104   CO2 mmol/L 28 28   BUN mg/dL 18 19   CREATININE mg/dL 0 92 0 95   CALCIUM mg/dL 9 5 9 6   ALK PHOS U/L  --  73   ALT U/L  --  22   AST U/L  --  19       Results from last 7 days  Lab Units 01/02/19  1407   INR  1 00       * I Have Reviewed All Lab Data Listed Above  * Additional Pertinent Lab Tests Reviewed:  [address] [facility] Admission Reviewed    Imaging:    Imaging Reports Reviewed Today Include:  MRI of the brain  Imaging Personally Reviewed by Myself Includes:     Recent Cultures (last 7 days):           Last 24 Hours Medication List:     Current Facility-Administered Medications:  aspirin 81 mg Oral Daily Rissa Shin MD   atorvastatin 10 mg Oral Daily Rissa Shin MD   enoxaparin 40 mg Subcutaneous Daily Rissa Shin MD   insulin glargine 20 Units Subcutaneous QAM Genna Cottrell MD   insulin lispro 1-5 Units Subcutaneous TID AC Rissa Shin MD   levothyroxine 125 mcg Oral Daily Rissa Shin MD   meclizine 25 mg Oral Q8H PRN Rissa Shin MD   metoprolol succinate 12 5 mg Oral Daily Rissa Shin MD   nitroglycerin 0 4 mg Sublingual Q5 Min PRN Rissa Shin MD   pantoprazole 20 mg Oral Early Morning Nimesh Moe MD   tamsulosin 0 4 mg Oral Daily With Suma Valentine MD        Today, Patient Was Seen By: Semaj Jorge MD    ** Please Note: Dragon 360 Dictation voice to text software may have been used in the creation of this document   **

## 2019-01-05 NOTE — DISCHARGE SUMMARY
Discharge Summary - [address] [facility]    Patient Information: Larry Hinkle 80 y o  male MRN: [id number]  Unit/Bed#: -01 Encounter: [id number]    Discharging Physician / Practitioner: Roberta Lynch MD  PCP: Arturo Barker MD  Admission Date: 1/2/2019  Discharge Date: 01/05/19    Disposition:     Other: STR at [facility]    Reason for Admission:     Discharge Diagnoses:  Dizziness likely secondary to BPPV    Principal Problem:    Dizziness  Active Problems:    CAD (coronary artery disease)    Hyperlipidemia    Hypothyroidism    Diabetes mellitus, type 2 ([facility] )    Benign prostatic hyperplasia    DDD (degenerative disc disease), lumbar  Resolved Problems:    * No resolved hospital problems  *      Consultations During Hospital Stay:  · Neurology    Procedures Performed:     · None    Significant Findings / Test Results:   MRI of the brain  Mild chronic microangiopathic ischemic changes involving supratentorial white matter  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  Findings are stable  No acute ischemic disease  Normal appearance of the orbits, cavernous sinuses and brainstem     CT of the head  No acute intracranial abnormality  Incidental Findings:   · None    Test Results Pending at Discharge (will require follow up): · None     Outpatient Tests Requested:  · None    Complications:  None    Hospital Course:     Larry Hinkle is a 80 y o  male patient with past medical history of uncontrolled diabetes, hyperlipidemia, long history of vertigo/dizziness who originally presented to the hospital on 1/2/2019 due to episodes of dizziness upon standing/lifting his head for the last few days  Also admits to have had nausea with change in position otherwise no other complaints  No focal weakness or deficits  CT scan of the head was negative for any acute intracranial pathology  UA is unremarkable  He did not have any other source of infection    His white count was normal   Chest x-ray was unremarkable  He was evaluated by Neurology, later underwent MRI of the brain which is negative for stroke  These symptoms are likely believed secondary to BPPV  Patient recommended to have close vestibular rehab after discharge  He was evaluated by PT/OT who recommended short-term rehab, patient was discharged to [place] [address]  Patient's diabetes was uncontrolled, A1c 11 3%  Patient was started on Lantus 20 units QAM   His glipizide was discontinued on discharge  He was on multiple oral hypoglycemics, was medication should be adjusted as outpatient by his PCP  Recommended close outpatient follow-up for further management of diabetes  Condition at Discharge: stable     Discharge Day Visit / Exam:     Subjective:  Patient seen and examined  Currently denies any complaints at this time  He denies any dizziness  He is able to ambulate with the help of walker/aid to the bathroom without feeling dizzy    Vitals: Blood Pressure: 110/60 (01/05/19 0714)  Pulse: 72 (01/05/19 0714)  Temperature: (!) 97 4 °F (36 3 °C) (01/05/19 0714)  Temp Source: Oral (01/05/19 0714)  Respirations: 18 (01/05/19 0714)  Height: 5' 7" (170 2 cm) (01/02/19 1658)  Weight - Scale: 69 6 kg (153 lb 7 oz) (01/02/19 1658)  SpO2: 92 % (01/05/19 0714)  Exam:   Physical Exam   Constitutional: He is oriented to person, place, and time  He appears well-developed and well-nourished  HENT:   Head: Normocephalic and atraumatic  Eyes: Pupils are equal, round, and reactive to light  Conjunctivae and EOM are normal    Neck: Normal range of motion  Neck supple  Cardiovascular: Normal rate, regular rhythm and normal heart sounds  Pulmonary/Chest: Effort normal and breath sounds normal    Abdominal: Soft  Bowel sounds are normal    Musculoskeletal: Normal range of motion  Neurological: He is alert and oriented to person, place, and time         Discussion with Family: patient    Discharge instructions/Information to patient and family: See after visit summary for information provided to patient and family  Provisions for Follow-Up Care:  See after visit summary for information related to follow-up care and any pertinent home health orders  Planned Readmission: none     Discharge Statement:  I spent 29 minutes discharging the patient  This time was spent on the day of discharge  I had direct contact with the patient on the day of discharge  Greater than 50% of the total time was spent examining patient, answering all patient questions, arranging and discussing plan of care with patient as well as directly providing post-discharge instructions  Additional time then spent on discharge activities  Discharge Medications:  See after visit summary for reconciled discharge medications provided to patient and family        ** Please Note: This note has been constructed using a voice recognition system **

## 2019-01-07 ENCOUNTER — TRANSITIONAL CARE MANAGEMENT (OUTPATIENT)
Dept: FAMILY MEDICINE CLINIC | Facility: CLINIC | Age: 84
End: 2019-01-07

## 2019-01-08 ENCOUNTER — TELEPHONE (OUTPATIENT)
Dept: NEUROLOGY | Facility: CLINIC | Age: 84
End: 2019-01-08

## 2019-01-30 ENCOUNTER — OFFICE VISIT (OUTPATIENT)
Dept: NEUROLOGY | Facility: CLINIC | Age: 84
End: 2019-01-30
Payer: MEDICARE

## 2019-01-30 VITALS
DIASTOLIC BLOOD PRESSURE: 70 MMHG | HEIGHT: 67 IN | HEART RATE: 86 BPM | WEIGHT: 150 LBS | SYSTOLIC BLOOD PRESSURE: 122 MMHG | BODY MASS INDEX: 23.54 KG/M2

## 2019-01-30 DIAGNOSIS — Z86.73 HISTORY OF CVA (CEREBROVASCULAR ACCIDENT): ICD-10-CM

## 2019-01-30 DIAGNOSIS — H81.10 BENIGN PAROXYSMAL POSITIONAL VERTIGO, UNSPECIFIED LATERALITY: Primary | ICD-10-CM

## 2019-01-30 PROCEDURE — 99214 OFFICE O/P EST MOD 30 MIN: CPT | Performed by: PSYCHIATRY & NEUROLOGY

## 2019-01-30 RX ORDER — ACETAMINOPHEN 325 MG/1
650 TABLET ORAL EVERY 6 HOURS PRN
COMMUNITY
End: 2021-01-01

## 2019-01-30 RX ORDER — BISACODYL 10 MG
10 SUPPOSITORY, RECTAL RECTAL
COMMUNITY

## 2019-01-30 NOTE — PROGRESS NOTES
Progress Note - Neurology   Cam Magaña 80 y o  male MRN: [id number]  Unit/Bed#:  Encounter: [id number]      Subjective:   Patient is here for a follow-up visit with a history of benign positional vertigo, and was last seen by me over 5 years ago for similar symptoms  In the recent past the patient has been hospitalized twice months for a UTI, and vertigo and again admitted in January with similar symptoms  Patient generally gets prompt relief with the help of meclizine 12 5 mg 3 times a day  He was evaluated by Neurology on this occasion, had an MRI of the brain and orbits which was essentially unremarkable with chronic T2 changes and an 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  Patient denies any history of CVA but has a history of coronary artery disease hypertension hyperlipidemia and diabetes mellitus  Currently in a skilled nursing facility  Patient was also treated by myself in the past for Lyme disease  He denies any new neurological symptoms and at the skilled nursing facility has been ambulating with the help of a walker  His vertigo has now resolved  ROS:   Review of Systems   Constitutional: Positive for fatigue  Negative for appetite change and fever  HENT: Negative  Negative for hearing loss, tinnitus, trouble swallowing and voice change  Eyes: Negative  Negative for photophobia and pain  Respiratory: Negative  Negative for shortness of breath  Cardiovascular: Negative  Negative for palpitations  Gastrointestinal: Negative  Negative for nausea and vomiting  Endocrine: Negative  Negative for cold intolerance and heat intolerance  Genitourinary: Negative  Negative for dysuria, frequency and urgency  Musculoskeletal: Negative  Negative for myalgias and neck pain  Hip pain when sleeping on his side   Skin: Negative  Negative for rash  Neurological: Positive for dizziness   Negative for tremors, seizures, syncope, facial asymmetry, speech difficulty, weakness, light-headedness, numbness and headaches  Hematological: Negative  Does not bruise/bleed easily  Psychiatric/Behavioral: Positive for sleep disturbance  Negative for confusion and hallucinations  Vitals: There were no vitals filed for this visit  ,There is no height or weight on file to calculate BMI  MEDS:      Current Outpatient Prescriptions:     aspirin (ASPIRIN LOW DOSE) 81 MG tablet, Take 1 tablet by mouth daily, Disp: , Rfl:     atorvastatin (LIPITOR) 10 mg tablet, TAKE 1 TABLET DAILY  , Disp: 30 tablet, Rfl: 8    cyanocobalamin (VITAMIN B-12) 1,000 mcg tablet, Take by mouth, Disp: , Rfl:     glucose blood (ONE TOUCH ULTRA TEST) test strip, by In Vitro route, Disp: , Rfl:     glucose blood test strip, , Disp: , Rfl:     insulin glargine (LANTUS) 100 units/mL subcutaneous injection, Inject 20 Units under the skin daily, Disp: , Rfl: 0    levothyroxine 125 mcg tablet, Take 1 tablet (125 mcg total) by mouth daily, Disp: 90 tablet, Rfl: 3    meclizine (ANTIVERT) 12 5 MG tablet, Take 1 tablet (12 5 mg total) by mouth every 8 (eight) hours as needed for dizziness, Disp: 30 tablet, Rfl: 0    metFORMIN (GLUCOPHAGE-XR) 500 mg 24 hr tablet, Take 1 tablet (500 mg total) by mouth 2 (two) times a day with meals, Disp: 180 tablet, Rfl: 3    metoprolol succinate (TOPROL-XL) 25 mg 24 hr tablet, Take 0 5 tablets by mouth daily, Disp: , Rfl:     nateglinide (STARLIX) 120 mg tablet, TAKE 1 TABLET THREE TIMES A DAY BEFORE MEALS, Disp: 270 tablet, Rfl: 3    nitroglycerin (NITROSTAT) 0 4 mg SL tablet, Place 1 tablet under the tongue every 5 (five) minutes as needed, Disp: , Rfl:     omeprazole (PRILOSEC) 20 mg delayed release capsule, Take 1 capsule (20 mg total) by mouth daily, Disp: 180 capsule, Rfl: 0    ONE TOUCH ULTRA TEST test strip, CHECK BLOOD SUGAR DAILY *DX: E11 9, Disp: 100 each, Rfl: 3    pioglitazone (ACTOS) 45 mg tablet, Take 1 tablet by mouth daily, Disp: , Rfl:     polyethylene glycol (MIRALAX) 17 g packet, , Disp: , Rfl:     potassium-sodium phosphateS (PHOSPHA 250 NEUTRAL) 155-852-130 mg tablet, , Disp: , Rfl:     tamsulosin (FLOMAX) 0 4 mg, Take 1 capsule (0 4 mg total) by mouth daily with dinner for 30 days, Disp: 30 capsule, Rfl: 0  :    Physical Exam:  General appearance: alert, appears stated age and cooperative  Head: Normocephalic, without obvious abnormality, atraumatic    Patient is alert awake oriented, high functions are intact, speech is fluent  No evidence of any aphasia or dysarthria  Cranial nerve examination reveals visual fields are full to threat, pupils equal and reactive, extraocular movements intact, fundi showed sharp disc margins, sensation in the V1 V2 V3 distribution is symmetric, no obvious facial asymmetry noted,Hearing is preserved, tongue is midline and gag is adequate, shoulder shrug is symmetric bilaterally  Motor examination reveals normal tone and bulk, no evidence of any drift to the outstretched extremities, strength is 5/5 preserved bilaterally in both upper and lower extremities, deep tendon reflexes are hypoactive throughout, toes are downgoing  Sensory examination to pinprick light touch proprioception and vibration is preserved bilaterally, patient does not extinguish double simultaneous stimuli  Coordination no evidence of any finger-to-nose dysmetria  Gait is somewhat wide-based based Romberg sign is negative  Lab Results: I have personally reviewed pertinent reports  Imaging Studies: I have personally reviewed pertinent reports  Assessment:  1  Benign positional vertigo  2  History of right frontal subcortical CVA  Plan:  Patient is advised to continue aspirin, lipid-lowering agents, tight blood pressure and blood sugar control is encouraged, and for the benign positional vertigo, he has been on meclizine 12 5 mg 3 times a day on a p r n  Basis  Patient will continue the same    Home exercise program is encouraged and he will return back to see me in 3-4 months  1/30/2019,12:51 PM    Dictation voice to text software has been used in the creation of this document  Please consider this in light of any contextual or grammatical errors

## 2019-01-31 NOTE — PROGRESS NOTES
Assessment and Plan:    Problem List Items Addressed This Visit     None      Visit Diagnoses     Medicare annual wellness visit, subsequent    -  Primary        Health Maintenance Due   Topic Date Due    SLP PLAN OF CARE  07/31/1930    DTaP,Tdap,and Td Vaccines (1 - Tdap) 07/31/1951    URINE MICROALBUMIN  05/15/2018    INFLUENZA VACCINE  09/01/2018         HPI:  Robert Priest is a 80 y o  male here for his Subsequent Wellness Visit  Patient Active Problem List   Diagnosis    CAD (coronary artery disease)    Hyperlipidemia    Hypothyroidism    Diabetes mellitus, type 2 (HCC)    Chronic cough    Acid reflux disease    Benign prostatic hyperplasia    DDD (degenerative disc disease), lumbar    Diverticulosis    Abrasion     Past Medical History:   Diagnosis Date    BRBPR (bright red blood per rectum)     Last Assessed: 1/11/2016    Chest tightness or pressure     Last Assessed: 3/27/2014    Cholecystitis     Last Assessed: 9/1/2015    Lyme disease     Shortness of breath     Last Assessed: 3/27/2014    Vertigo     Last Assessed: 12/31/2015     Past Surgical History:   Procedure Laterality Date    CATARACT EXTRACTION      Last Assessed: 11/11/2016    HERNIA REPAIR      LAPAROSCOPIC CHOLECYSTECTOMY      TONSILLECTOMY       Family History   Problem Relation Age of Onset    No Known Problems Mother     Bipolar disorder Family         II    Tongue cancer Family     Prostate cancer Family     Other Family         cardiac disorder     History   Smoking Status    Never Smoker   Smokeless Tobacco    Never Used     History   Alcohol Use No      History   Drug Use No       Current Outpatient Prescriptions   Medication Sig Dispense Refill    aspirin (ASPIRIN LOW DOSE) 81 MG tablet Take 1 tablet by mouth daily      atorvastatin (LIPITOR) 10 mg tablet TAKE 1 TABLET DAILY   30 tablet 8    cyanocobalamin (VITAMIN B-12) 1,000 mcg tablet Take by mouth      glipiZIDE (GLUCOTROL) 10 mg tablet Take 2 tablets by mouth 2 (two) times a day      glucose blood (ONE TOUCH ULTRA TEST) test strip by In Vitro route      glucose blood test strip       levothyroxine 125 mcg tablet Take 1 tablet by mouth daily      metFORMIN (GLUCOPHAGE-XR) 500 mg 24 hr tablet Take 1 tablet (500 mg total) by mouth 2 (two) times a day with meals 180 tablet 3    metoprolol succinate (TOPROL-XL) 25 mg 24 hr tablet Take 0 5 tablets by mouth daily      nateglinide (STARLIX) 120 mg tablet TAKE 1 TABLET THREE TIMES A DAY BEFORE MEALS 270 tablet 3    nitroglycerin (NITROSTAT) 0 4 mg SL tablet Place 1 tablet under the tongue every 5 (five) minutes as needed      omeprazole (PRILOSEC) 20 mg delayed release capsule Take 2 capsules (40 mg total) by mouth daily 180 capsule 3    omeprazole (PriLOSEC) 40 MG capsule Take 1 capsule (40 mg total) by mouth daily 90 capsule 3    ONE TOUCH ULTRA TEST test strip CHECK BLOOD SUGAR DAILY *DX: E11 9 100 each 3    pioglitazone (ACTOS) 45 mg tablet Take 1 tablet by mouth daily      polyethylene glycol (MIRALAX) 17 g packet       potassium-sodium phosphateS (PHOSPHA 250 NEUTRAL) 155-852-130 mg tablet        No current facility-administered medications for this visit  No Known Allergies  Immunization History   Administered Date(s) Administered    Influenza 10/14/2017, 08/28/2018    Influenza Split High Dose Preservative Free IM 09/13/2016    Pneumococcal Conjugate 13-Valent 09/13/2016    Pneumococcal Polysaccharide PPV23 10/15/2009, 10/19/2017       Patient Care Team:  Lexis Wilder MD as PCP - Sharan Claude, MD    Medicare Screening Tests and Risk Assessments:  Last Medicare Wellness visit information reviewed, patient interviewed and updates made to the record today  Health Risk Assessment:  Patient rates overall health as good  Patient feels that their physical health rating is Same  Eyesight was rated as Same  Hearing was rated as Same   Patient feels that their emotional and mental health rating is Same  Pain experienced by patient in the last 7 days has been None  Patient states that he has experienced no weight loss or gain in last 6 months  Emotional/Mental Health:  Patient has been feeling nervous/anxious  PHQ-9 Depression Screening:    Frequency of the following problems over the past two weeks:      1  Little interest or pleasure in doing things: 0 - not at all      2  Feeling down, depressed, or hopeless: 0 - not at all  PHQ-2 Score: 0          Broken Bones/Falls: Fall Risk Assessment:    In the past year, patient has experienced: No history of falling in past year          Bladder/Bowel:  Patient has not leaked urine accidently in the last six months  Patient reports no loss of bowel control  Immunizations:  Patient has had a flu vaccination within the last year  Patient has received a pneumonia shot  Patient has not received a shingles shot  Patient has received tetanus/diphtheria shot  Home Safety:  Patient does not have trouble with stairs inside or outside of their home  Patient currently reports that there are no safety hazards present in home, working smoke alarms, working carbon monoxide detectors  Preventative Screenings:   prostate cancer screen performed, no colon cancer screen completed, cholesterol screen completed, glaucoma eye exam completed,     Nutrition:  Current diet: Regular with servings of the following:    Medications:  Patient is not currently taking any over-the-counter supplements  Patient is able to manage medications  Lifestyle Choices:  Patient reports no tobacco use  Patient has not smoked or used tobacco in the past   Patient reports no alcohol use  Patient drives a vehicle  Patient wears seat belt  Current level of exercise of physical activity described by patient as: none          Activities of Daily Living:  Can get out of bed by his or her self, able to dress self, able to make own meals, able to do own shopping, able to bathe self, can do own laundry/housekeeping, can manage own money, pay bills and track expenses    Previous Hospitalizations:  No hospitalization or ED visit in past 12 months        Advanced Directives:  Patient has decided on a power of   Patient has spoken to designated power of   Patient has completed advanced directive  Preventative Screening/Counseling:      Cardiovascular:      General: Screening Current          Diabetes:      General: Screening Current          Colorectal Cancer:      General: Screening Not Indicated          Prostate Cancer:      General: Screening Current          Osteoporosis:      General: Screening Not Indicated          AAA:      General: Screening Not Indicated          HIV:      General: Screening Not Indicated          Hepatitis C:      General: Screening Not Indicated        Advanced Directives:   Patient has living will for healthcare, has durable POA for healthcare, patient has an advanced directive  Information on ACP and/or AD provided       Immunizations:      Influenza: Influenza UTD This Year      Shingrix: Risks & Benefits Discussed      TDAP: Risks & Benefits Discussed no

## 2019-02-05 ENCOUNTER — TELEPHONE (OUTPATIENT)
Dept: FAMILY MEDICINE CLINIC | Facility: CLINIC | Age: 84
End: 2019-02-05

## 2019-02-05 NOTE — TELEPHONE ENCOUNTER
Pt was d/c from [facility]"" and transferred to [facility]  He was d/c from [facility] on 2/2/19  Pt was d/c on Lantus Pen  That is too expensive for him  Could  It be switched to something else? Pt's metformin has also been increased to 500 mg  2 tabs BID  metoprolol succ ER 1/2 tab 1 qd 25 mg   [facility]: [phone]    Only call patient on his cell phone: Mobile: [phone]  His pipes burst at home and he is staying in a hotel

## 2019-02-05 NOTE — TELEPHONE ENCOUNTER
Antonia Solorio from [facility] called regarding pt  He was d/c [facility] and admitted to [facility]  He was d/c to home from [facility] on 2/2  However, pt's pipes burst and he is now residing in a hotel  If you need to contact pt to make a TCM or f/u appt please call on Pt cell number      Antonia Solorio: [phone]    PT: Mobile: [phone]

## 2019-02-06 NOTE — TELEPHONE ENCOUNTER
Pt reached out about scheduling him TCM appt  Please follow up on his cell phone [phone]  He is in a hotel right now and can only be reached on this number

## 2019-02-06 NOTE — TELEPHONE ENCOUNTER
Patient is scheduled for a f/u from the hospital, records were requested he is seeing jose on 02/15/19

## 2019-02-07 ENCOUNTER — OFFICE VISIT (OUTPATIENT)
Dept: FAMILY MEDICINE CLINIC | Facility: CLINIC | Age: 84
End: 2019-02-07
Payer: MEDICARE

## 2019-02-07 VITALS
HEART RATE: 96 BPM | DIASTOLIC BLOOD PRESSURE: 60 MMHG | RESPIRATION RATE: 18 BRPM | BODY MASS INDEX: 24.33 KG/M2 | OXYGEN SATURATION: 94 % | SYSTOLIC BLOOD PRESSURE: 102 MMHG | TEMPERATURE: 98.3 F | WEIGHT: 155 LBS | HEIGHT: 67 IN

## 2019-02-07 DIAGNOSIS — K21.9 GASTROESOPHAGEAL REFLUX DISEASE, ESOPHAGITIS PRESENCE NOT SPECIFIED: ICD-10-CM

## 2019-02-07 DIAGNOSIS — I25.10 CORONARY ARTERY DISEASE INVOLVING NATIVE CORONARY ARTERY OF NATIVE HEART WITHOUT ANGINA PECTORIS: ICD-10-CM

## 2019-02-07 DIAGNOSIS — E11.9 TYPE 2 DIABETES MELLITUS WITHOUT COMPLICATION, WITHOUT LONG-TERM CURRENT USE OF INSULIN (HCC): Primary | ICD-10-CM

## 2019-02-07 DIAGNOSIS — R42 DIZZINESS: ICD-10-CM

## 2019-02-07 DIAGNOSIS — E78.5 HYPERLIPIDEMIA, UNSPECIFIED HYPERLIPIDEMIA TYPE: ICD-10-CM

## 2019-02-07 DIAGNOSIS — E03.9 HYPOTHYROIDISM, UNSPECIFIED TYPE: ICD-10-CM

## 2019-02-07 PROCEDURE — 99214 OFFICE O/P EST MOD 30 MIN: CPT | Performed by: FAMILY MEDICINE

## 2019-02-07 RX ORDER — INSULIN GLARGINE 100 [IU]/ML
INJECTION, SOLUTION SUBCUTANEOUS
COMMUNITY
Start: 2019-02-02 | End: 2019-02-19

## 2019-02-07 RX ORDER — METOPROLOL SUCCINATE 25 MG/1
12.5 TABLET, EXTENDED RELEASE ORAL DAILY
Qty: 45 TABLET | Refills: 3 | Status: SHIPPED | OUTPATIENT
Start: 2019-02-07 | End: 2020-02-10

## 2019-02-07 NOTE — ASSESSMENT & PLAN NOTE
Lab Results   Component Value Date    HGBA1C 11 3 (H) 12/04/2018       No results for input(s): POCGLU in the last 72 hours  Blood Sugar Average: Last 72 hrs:   we are stopping Lantus insulin 20 units daily because of expense  He will start Humulin 70/30 10 units b i d  which I feel will be less expensive for him

## 2019-02-07 NOTE — PROGRESS NOTES
Assessment/Plan:    Return visit in 1 week       Problem List Items Addressed This Visit     CAD (coronary artery disease)    Relevant Medications    metoprolol succinate (TOPROL-XL) 25 mg 24 hr tablet    Hyperlipidemia     Continue atorvastatin 10 mg daily         Hypothyroidism     Continue levothyroxine 125 mcg daily         Relevant Medications    metoprolol succinate (TOPROL-XL) 25 mg 24 hr tablet    Diabetes mellitus, type 2 ([facility] [address] ) - Primary     Lab Results   Component Value Date    HGBA1C 11 3 (H) 12/04/2018       No results for input(s): POCGLU in the last 72 hours  Blood Sugar Average: Last 72 hrs:   we are stopping Lantus insulin 20 units daily because of expense  He will start Humulin 70/30 10 units b i d  which I feel will be less expensive for him  Relevant Medications    LANTUS SOLOSTAR 100 units/mL injection pen    insulin NPH-insulin regular (HUMULIN 70/30) 100 units/mL subcutaneous injection    Acid reflux disease     Continue omeprazole 20 mg daily         Dizziness     Currently stable  He has followed up with Neurology  Subjective:      Patient ID: Margoth Yates is a 80 y o  male  Patient comes in for hospital follow-up  He was admitted for dizziness  He has felt to have benign positional vertigo  While in the hospital he was started on Lantus insulin for elevated blood sugar  This is very expensive and he refuses to pay for this  The following portions of the patient's history were reviewed and updated as appropriate: allergies, current medications, past family history, past medical history, past social history, past surgical history and problem list     Review of Systems   Constitutional: Negative  HENT: Negative  Respiratory: Negative  Cardiovascular: Negative            Objective:      /60   Pulse 96   Temp 98 3 °F (36 8 °C)   Resp 18   Ht 5' 7" (1 702 m)   Wt 70 3 kg (155 lb)   SpO2 94%   BMI 24 28 kg/m²          Physical Exam Constitutional: He is oriented to person, place, and time  He appears well-developed and well-nourished  HENT:   Head: Normocephalic and atraumatic  Right Ear: Tympanic membrane and external ear normal    Left Ear: Tympanic membrane and external ear normal    Eyes: Pupils are equal, round, and reactive to light  EOM are normal    Neck: Neck supple  Cardiovascular: Normal rate, regular rhythm and normal heart sounds  Pulmonary/Chest: Effort normal and breath sounds normal    Abdominal: Soft  Bowel sounds are normal    Musculoskeletal: Normal range of motion  Neurological: He is alert and oriented to person, place, and time  Skin: Skin is warm and dry  Psychiatric: He has a normal mood and affect   Thought content normal

## 2019-02-11 ENCOUNTER — TELEPHONE (OUTPATIENT)
Dept: FAMILY MEDICINE CLINIC | Facility: CLINIC | Age: 84
End: 2019-02-11

## 2019-02-11 NOTE — TELEPHONE ENCOUNTER
Angelia Perez from [facility] called stating that [facility] need clarification on the Humulin insulin prescription before they can fill it    Please advise

## 2019-02-19 ENCOUNTER — OFFICE VISIT (OUTPATIENT)
Dept: FAMILY MEDICINE CLINIC | Facility: CLINIC | Age: 84
End: 2019-02-19
Payer: MEDICARE

## 2019-02-19 VITALS
RESPIRATION RATE: 16 BRPM | DIASTOLIC BLOOD PRESSURE: 60 MMHG | TEMPERATURE: 97.5 F | WEIGHT: 156 LBS | OXYGEN SATURATION: 97 % | BODY MASS INDEX: 24.48 KG/M2 | HEIGHT: 67 IN | HEART RATE: 92 BPM | SYSTOLIC BLOOD PRESSURE: 106 MMHG

## 2019-02-19 DIAGNOSIS — N40.0 BENIGN PROSTATIC HYPERPLASIA: ICD-10-CM

## 2019-02-19 DIAGNOSIS — E78.5 HYPERLIPIDEMIA, UNSPECIFIED HYPERLIPIDEMIA TYPE: ICD-10-CM

## 2019-02-19 DIAGNOSIS — E11.9 TYPE 2 DIABETES MELLITUS WITHOUT COMPLICATION, WITHOUT LONG-TERM CURRENT USE OF INSULIN (HCC): Primary | ICD-10-CM

## 2019-02-19 DIAGNOSIS — E03.9 HYPOTHYROIDISM, UNSPECIFIED TYPE: ICD-10-CM

## 2019-02-19 DIAGNOSIS — R42 VERTIGO: ICD-10-CM

## 2019-02-19 DIAGNOSIS — I25.10 CORONARY ARTERY DISEASE INVOLVING NATIVE CORONARY ARTERY OF NATIVE HEART WITHOUT ANGINA PECTORIS: ICD-10-CM

## 2019-02-19 PROCEDURE — 99214 OFFICE O/P EST MOD 30 MIN: CPT | Performed by: FAMILY MEDICINE

## 2019-02-19 RX ORDER — TAMSULOSIN HYDROCHLORIDE 0.4 MG/1
0.4 CAPSULE ORAL
Qty: 90 CAPSULE | Refills: 5 | Status: SHIPPED | OUTPATIENT
Start: 2019-02-19 | End: 2019-12-03 | Stop reason: SDUPTHER

## 2019-02-19 RX ORDER — LEVOTHYROXINE SODIUM 0.12 MG/1
125 TABLET ORAL DAILY
Qty: 90 TABLET | Refills: 5 | Status: SHIPPED | OUTPATIENT
Start: 2019-02-19 | End: 2020-03-09

## 2019-02-19 RX ORDER — MECLIZINE HCL 12.5 MG/1
12.5 TABLET ORAL EVERY 8 HOURS PRN
Qty: 30 TABLET | Refills: 5 | Status: SHIPPED | OUTPATIENT
Start: 2019-02-19

## 2019-02-19 NOTE — ASSESSMENT & PLAN NOTE
Lab Results   Component Value Date    HGBA1C 11 3 (H) 12/04/2018       No results for input(s): POCGLU in the last 72 hours  Blood Sugar Average: Last 72 hrs:   continue Humulin 70 30 insulin 10 units b i d

## 2019-02-19 NOTE — PROGRESS NOTES
Assessment/Plan:      Return visit in 3 months with fasting blood work prior to visit     Problem List Items Addressed This Visit        Endocrine    Hypothyroidism    Relevant Medications    levothyroxine 125 mcg tablet    Other Relevant Orders    TSH, 3rd generation with Free T4 reflex    Diabetes mellitus, type 2 ([facility] [address] ) - Primary     Lab Results   Component Value Date    HGBA1C 11 3 (H) 12/04/2018       No results for input(s): POCGLU in the last 72 hours  Blood Sugar Average: Last 72 hrs:   continue Humulin 70 30 insulin 10 units b i d  Relevant Medications    Insulin Pen Needle (BD AUTOSHIELD DUO) 30G X 5 MM MISC    Other Relevant Orders    Hemoglobin A1C       Cardiovascular and Mediastinum    CAD (coronary artery disease)     Continue Toprol-XL 25 milligrams daily            Genitourinary    Benign prostatic hyperplasia    Relevant Medications    tamsulosin (FLOMAX) 0 4 mg       Other    Hyperlipidemia     Continue Lipitor 10 milligrams daily         Relevant Orders    Comprehensive metabolic panel    Lipid panel    Vertigo    Relevant Medications    meclizine (ANTIVERT) 12 5 MG tablet            Subjective:      Patient ID: Waylon Lerner is a 80 y o  male  Patient comes in for a recheck  He is doing well with switching to Humulin 70 30 insulin  His blood sugars are running in the mid 100s  He had episode of dizziness yesterday which is chronic in nature and sounds like benign positional vertigo  He has had neurologic workup for this in the past       The following portions of the patient's history were reviewed and updated as appropriate: allergies, current medications, past family history, past medical history, past social history, past surgical history and problem list     Review of Systems   Constitutional: Negative  HENT: Negative  Respiratory: Negative  Cardiovascular: Negative  Neurological: Positive for dizziness  Negative for syncope           Objective:      /60 Pulse 92   Temp 97 5 °F (36 4 °C)   Resp 16   Ht 5' 7" (1 702 m)   Wt 70 8 kg (156 lb)   SpO2 97%   BMI 24 43 kg/m²          Physical Exam   Constitutional: He is oriented to person, place, and time  He appears well-developed and well-nourished  HENT:   Head: Normocephalic and atraumatic  Right Ear: Tympanic membrane and external ear normal    Left Ear: Tympanic membrane and external ear normal    Mouth/Throat: Oropharynx is clear and moist    Eyes: Pupils are equal, round, and reactive to light  EOM are normal    Neck: Neck supple  Cardiovascular: Normal rate, regular rhythm and normal heart sounds  Pulmonary/Chest: Effort normal and breath sounds normal    Abdominal: Soft  Bowel sounds are normal    Musculoskeletal: Normal range of motion  Neurological: He is alert and oriented to person, place, and time  Skin: Skin is warm and dry  Psychiatric: He has a normal mood and affect   Thought content normal

## 2019-03-11 DIAGNOSIS — E11.9 TYPE 2 DIABETES MELLITUS WITHOUT COMPLICATION, WITHOUT LONG-TERM CURRENT USE OF INSULIN (HCC): ICD-10-CM

## 2019-03-11 PROBLEM — E11.3313 TYPE 2 DIABETES MELLITUS WITH MODERATE NONPROLIFERATIVE DIABETIC RETINOPATHY WITH MACULAR EDEMA, BILATERAL (HCC): Status: ACTIVE | Noted: 2019-03-11

## 2019-03-11 RX ORDER — METFORMIN HYDROCHLORIDE 500 MG/1
500 TABLET, EXTENDED RELEASE ORAL 2 TIMES DAILY WITH MEALS
Qty: 180 TABLET | Refills: 0 | Status: SHIPPED | OUTPATIENT
Start: 2019-03-11 | End: 2019-06-15 | Stop reason: SDUPTHER

## 2019-03-11 NOTE — TELEPHONE ENCOUNTER
[facility] pharmacy #[id number] [phone] Rx request  Dr Wale Bradshaw is out of the office please assist

## 2019-03-15 ENCOUNTER — TELEPHONE (OUTPATIENT)
Dept: FAMILY MEDICINE CLINIC | Facility: CLINIC | Age: 84
End: 2019-03-15

## 2019-03-15 DIAGNOSIS — K21.9 GASTROESOPHAGEAL REFLUX DISEASE WITHOUT ESOPHAGITIS: Primary | ICD-10-CM

## 2019-03-15 RX ORDER — FAMOTIDINE 40 MG/1
40 TABLET, FILM COATED ORAL DAILY
Qty: 30 TABLET | Refills: 0 | Status: SHIPPED | OUTPATIENT
Start: 2019-03-15 | End: 2019-04-22 | Stop reason: SDUPTHER

## 2019-03-15 NOTE — TELEPHONE ENCOUNTER
Nurse from [facility] called to inform us omeprazole not on formulary - asking if Pepcid could be sent in instead ?   (BD PT)

## 2019-04-22 DIAGNOSIS — K21.9 GASTROESOPHAGEAL REFLUX DISEASE WITHOUT ESOPHAGITIS: ICD-10-CM

## 2019-04-22 RX ORDER — FAMOTIDINE 40 MG/1
TABLET, FILM COATED ORAL
Qty: 30 TABLET | Refills: 0 | Status: SHIPPED | OUTPATIENT
Start: 2019-04-22 | End: 2019-05-27 | Stop reason: SDUPTHER

## 2019-04-29 ENCOUNTER — TELEPHONE (OUTPATIENT)
Dept: FAMILY MEDICINE CLINIC | Facility: CLINIC | Age: 84
End: 2019-04-29

## 2019-05-03 DIAGNOSIS — E11.9 TYPE 2 DIABETES MELLITUS WITHOUT COMPLICATION, WITHOUT LONG-TERM CURRENT USE OF INSULIN (HCC): ICD-10-CM

## 2019-05-27 DIAGNOSIS — K21.9 GASTROESOPHAGEAL REFLUX DISEASE WITHOUT ESOPHAGITIS: ICD-10-CM

## 2019-05-27 RX ORDER — FAMOTIDINE 40 MG/1
TABLET, FILM COATED ORAL
Qty: 30 TABLET | Refills: 0 | Status: SHIPPED | OUTPATIENT
Start: 2019-05-27 | End: 2019-05-28 | Stop reason: SDUPTHER

## 2019-05-28 ENCOUNTER — OFFICE VISIT (OUTPATIENT)
Dept: FAMILY MEDICINE CLINIC | Facility: CLINIC | Age: 84
End: 2019-05-28
Payer: MEDICARE

## 2019-05-28 VITALS
BODY MASS INDEX: 23.7 KG/M2 | RESPIRATION RATE: 16 BRPM | WEIGHT: 151 LBS | SYSTOLIC BLOOD PRESSURE: 102 MMHG | TEMPERATURE: 97.3 F | HEART RATE: 81 BPM | OXYGEN SATURATION: 97 % | HEIGHT: 67 IN | DIASTOLIC BLOOD PRESSURE: 60 MMHG

## 2019-05-28 DIAGNOSIS — D64.9 ANEMIA, UNSPECIFIED TYPE: ICD-10-CM

## 2019-05-28 DIAGNOSIS — N40.1 BENIGN PROSTATIC HYPERPLASIA WITH URINARY FREQUENCY: ICD-10-CM

## 2019-05-28 DIAGNOSIS — K21.9 GASTROESOPHAGEAL REFLUX DISEASE, ESOPHAGITIS PRESENCE NOT SPECIFIED: ICD-10-CM

## 2019-05-28 DIAGNOSIS — E11.3313 TYPE 2 DIABETES MELLITUS WITH BOTH EYES AFFECTED BY MODERATE NONPROLIFERATIVE RETINOPATHY AND MACULAR EDEMA, WITHOUT LONG-TERM CURRENT USE OF INSULIN (HCC): ICD-10-CM

## 2019-05-28 DIAGNOSIS — I25.10 CORONARY ARTERY DISEASE INVOLVING NATIVE CORONARY ARTERY OF NATIVE HEART WITHOUT ANGINA PECTORIS: ICD-10-CM

## 2019-05-28 DIAGNOSIS — R35.0 BENIGN PROSTATIC HYPERPLASIA WITH URINARY FREQUENCY: ICD-10-CM

## 2019-05-28 DIAGNOSIS — E78.5 HYPERLIPIDEMIA, UNSPECIFIED HYPERLIPIDEMIA TYPE: ICD-10-CM

## 2019-05-28 DIAGNOSIS — K21.9 GASTROESOPHAGEAL REFLUX DISEASE WITHOUT ESOPHAGITIS: ICD-10-CM

## 2019-05-28 DIAGNOSIS — E11.9 TYPE 2 DIABETES MELLITUS WITHOUT COMPLICATION, WITHOUT LONG-TERM CURRENT USE OF INSULIN (HCC): Primary | ICD-10-CM

## 2019-05-28 DIAGNOSIS — E03.9 HYPOTHYROIDISM, UNSPECIFIED TYPE: ICD-10-CM

## 2019-05-28 LAB — SL AMB POCT HEMOGLOBIN AIC: 10.3 (ref ?–6.5)

## 2019-05-28 PROCEDURE — 1124F ACP DISCUSS-NO DSCNMKR DOCD: CPT | Performed by: FAMILY MEDICINE

## 2019-05-28 PROCEDURE — 83036 HEMOGLOBIN GLYCOSYLATED A1C: CPT | Performed by: FAMILY MEDICINE

## 2019-05-28 PROCEDURE — 99214 OFFICE O/P EST MOD 30 MIN: CPT | Performed by: FAMILY MEDICINE

## 2019-05-28 RX ORDER — FAMOTIDINE 40 MG/1
40 TABLET, FILM COATED ORAL DAILY
Qty: 30 TABLET | Refills: 5 | Status: SHIPPED | OUTPATIENT
Start: 2019-05-28 | End: 2019-11-29 | Stop reason: SDUPTHER

## 2019-06-15 DIAGNOSIS — E11.9 TYPE 2 DIABETES MELLITUS WITHOUT COMPLICATION, WITHOUT LONG-TERM CURRENT USE OF INSULIN (HCC): ICD-10-CM

## 2019-06-17 RX ORDER — METFORMIN HYDROCHLORIDE 500 MG/1
TABLET, EXTENDED RELEASE ORAL
Qty: 180 TABLET | Refills: 0 | Status: SHIPPED | OUTPATIENT
Start: 2019-06-17 | End: 2019-09-23 | Stop reason: SDUPTHER

## 2019-09-23 DIAGNOSIS — E11.9 TYPE 2 DIABETES MELLITUS WITHOUT COMPLICATION, WITHOUT LONG-TERM CURRENT USE OF INSULIN (HCC): ICD-10-CM

## 2019-09-23 RX ORDER — METFORMIN HYDROCHLORIDE 500 MG/1
TABLET, EXTENDED RELEASE ORAL
Qty: 180 TABLET | Refills: 0 | Status: SHIPPED | OUTPATIENT
Start: 2019-09-23 | End: 2019-12-02 | Stop reason: SDUPTHER

## 2019-09-27 LAB — HBA1C MFR BLD HPLC: 9.4 %

## 2019-09-30 ENCOUNTER — OFFICE VISIT (OUTPATIENT)
Dept: FAMILY MEDICINE CLINIC | Facility: CLINIC | Age: 84
End: 2019-09-30
Payer: MEDICARE

## 2019-09-30 VITALS
DIASTOLIC BLOOD PRESSURE: 60 MMHG | HEIGHT: 67 IN | BODY MASS INDEX: 25.58 KG/M2 | WEIGHT: 163 LBS | SYSTOLIC BLOOD PRESSURE: 104 MMHG | HEART RATE: 80 BPM | OXYGEN SATURATION: 97 % | RESPIRATION RATE: 16 BRPM | TEMPERATURE: 98.5 F

## 2019-09-30 DIAGNOSIS — K21.9 GASTROESOPHAGEAL REFLUX DISEASE, ESOPHAGITIS PRESENCE NOT SPECIFIED: ICD-10-CM

## 2019-09-30 DIAGNOSIS — E11.9 TYPE 2 DIABETES MELLITUS WITHOUT COMPLICATION, WITHOUT LONG-TERM CURRENT USE OF INSULIN (HCC): ICD-10-CM

## 2019-09-30 DIAGNOSIS — E03.9 HYPOTHYROIDISM, UNSPECIFIED TYPE: ICD-10-CM

## 2019-09-30 DIAGNOSIS — Z00.00 MEDICARE ANNUAL WELLNESS VISIT, SUBSEQUENT: ICD-10-CM

## 2019-09-30 DIAGNOSIS — E11.3313 TYPE 2 DIABETES MELLITUS WITH BOTH EYES AFFECTED BY MODERATE NONPROLIFERATIVE RETINOPATHY AND MACULAR EDEMA, WITHOUT LONG-TERM CURRENT USE OF INSULIN (HCC): ICD-10-CM

## 2019-09-30 DIAGNOSIS — Z23 NEED FOR INFLUENZA VACCINATION: ICD-10-CM

## 2019-09-30 DIAGNOSIS — E78.5 HYPERLIPIDEMIA, UNSPECIFIED HYPERLIPIDEMIA TYPE: ICD-10-CM

## 2019-09-30 DIAGNOSIS — H61.21 IMPACTED CERUMEN OF RIGHT EAR: Primary | ICD-10-CM

## 2019-09-30 DIAGNOSIS — I25.10 CORONARY ARTERY DISEASE INVOLVING NATIVE CORONARY ARTERY OF NATIVE HEART WITHOUT ANGINA PECTORIS: ICD-10-CM

## 2019-09-30 PROCEDURE — 69210 REMOVE IMPACTED EAR WAX UNI: CPT | Performed by: FAMILY MEDICINE

## 2019-09-30 PROCEDURE — G0438 PPPS, INITIAL VISIT: HCPCS | Performed by: FAMILY MEDICINE

## 2019-09-30 PROCEDURE — G0008 ADMIN INFLUENZA VIRUS VAC: HCPCS | Performed by: FAMILY MEDICINE

## 2019-09-30 PROCEDURE — 90662 IIV NO PRSV INCREASED AG IM: CPT | Performed by: FAMILY MEDICINE

## 2019-09-30 PROCEDURE — 99214 OFFICE O/P EST MOD 30 MIN: CPT | Performed by: FAMILY MEDICINE

## 2019-09-30 NOTE — PROGRESS NOTES
Assessment/Plan:    Return visit in 4 months with fasting blood work prior to visit   Diagnoses and all orders for this visit:    Impacted cerumen of right ear  -     Ear cerumen removal    Medicare annual wellness visit, subsequent    Type 2 diabetes mellitus with both eyes affected by moderate nonproliferative retinopathy and macular edema, without long-term current use of insulin ([facility])  -     insulin isophane-insulin regular (HUMULIN 70/30 KWIKPEN) 100 units/mL injection pen; Inject 30 units q am and 15 units q 3 pm  -     Microalbumin / creatinine urine ratio  -     Hemoglobin A1C; Future    Coronary artery disease involving native coronary artery of native heart without angina pectoris    Hyperlipidemia, unspecified hyperlipidemia type  -     Lipid panel; Future  -     Comprehensive metabolic panel; Future    Hypothyroidism, unspecified type  -     TSH, 3rd generation with Free T4 reflex; Future  -     T3, uptake; Future  -     T4, free; Future    Gastroesophageal reflux disease, esophagitis presence not specified    Need for influenza vaccination  -     influenza vaccine, [id number], high-dose, PF 0 5 mL (FLUZONE HIGH-DOSE)    Type 2 diabetes mellitus without complication, without long-term current use of insulin ([facility] [address] )        No problem-specific Assessment & Plan notes found for this encounter  Subjective:      Patient ID: Adeline Robertson is a 80 y o  male  Patient comes in for checkup  He complains of decreased hearing right ear  The following portions of the patient's history were reviewed and updated as appropriate:   He has a past medical history of BRBPR (bright red blood per rectum), Chest tightness or pressure, Cholecystitis, Diabetes mellitus ([address] ), Diverticulosis, Hearing loss, Hypothyroidism, Lyme disease, Shortness of breath, and Vertigo  ,  does not have any pertinent problems on file  ,   has a past surgical history that includes Cataract extraction; Laparoscopic cholecystectomy;  Hernia repair; and Tonsillectomy  ,  family history includes Bipolar disorder in his family; No Known Problems in his mother; Other in his family; Prostate cancer in his family; Tongue cancer in his family  ,   reports that he has never smoked  He has never used smokeless tobacco  He reports that he does not drink alcohol or use drugs  ,  has No Known Allergies     Current Outpatient Medications   Medication Sig Dispense Refill    acetaminophen (TYLENOL) 325 mg tablet Take 650 mg by mouth every 6 (six) hours as needed for mild pain      aspirin (ASPIRIN LOW DOSE) 81 MG tablet Take 1 tablet by mouth daily      atorvastatin (LIPITOR) 10 mg tablet TAKE 1 TABLET DAILY   30 tablet 8    bisacodyl (DULCOLAX) 10 mg suppository Insert 10 mg into the rectum Every 4th Morning PRN      cyanocobalamin (VITAMIN B-12) 1,000 mcg tablet Take by mouth      famotidine (PEPCID) 40 MG tablet Take 1 tablet (40 mg total) by mouth daily 30 tablet 5    glucose blood (ONE TOUCH ULTRA TEST) test strip Check blood sugar bid 200 each 5    insulin isophane-insulin regular (HUMULIN 70/30 KWIKPEN) 100 units/mL injection pen Inject 30 units q am and 15 units q 3 pm 5 pen 5    Insulin Pen Needle (BD AUTOSHIELD DUO) 30G X 5 MM MISC by Does not apply route 2 (two) times a day 100 each 5    levothyroxine 125 mcg tablet Take 1 tablet (125 mcg total) by mouth daily 90 tablet 5    Magnesium Hydroxide (MILK OF MAGNESIA PO) Take by mouth daily as needed      meclizine (ANTIVERT) 12 5 MG tablet Take 1 tablet (12 5 mg total) by mouth every 8 (eight) hours as needed for dizziness 30 tablet 5    metFORMIN (GLUCOPHAGE-XR) 500 mg 24 hr tablet TAKE 1 TABLET BY MOUTH TWICE DAILY WITH MEALS 180 tablet 0    metoprolol succinate (TOPROL-XL) 25 mg 24 hr tablet Take 0 5 tablets (12 5 mg total) by mouth daily 45 tablet 3    nateglinide (STARLIX) 120 mg tablet TAKE 1 TABLET THREE TIMES A DAY BEFORE MEALS 270 tablet 3    nitroglycerin (NITROSTAT) 0 4 mg SL tablet Place 1 tablet under the tongue every 5 (five) minutes as needed      pioglitazone (ACTOS) 45 mg tablet Take 1 tablet by mouth daily      polyethylene glycol (MIRALAX) 17 g packet       potassium-sodium phosphateS (PHOSPHA 250 NEUTRAL) 155-852-130 mg tablet 1 tablet every morning        tamsulosin (FLOMAX) 0 4 mg Take 1 capsule (0 4 mg total) by mouth daily with dinner for 30 days 90 capsule 5     No current facility-administered medications for this visit  Review of Systems   Respiratory: Negative  Cardiovascular: Negative  Gastrointestinal: Negative  Objective:  Vitals:    09/30/19 1306   BP: 104/60   Pulse: 80   Resp: 16   Temp: 98 5 °F (36 9 °C)   SpO2: 97%   Weight: 73 9 kg (163 lb)   Height: 5' 7" (1 702 m)     Body mass index is 25 53 kg/m²  Physical Exam   Constitutional: He is oriented to person, place, and time  He appears well-developed and well-nourished  HENT:   Head: Normocephalic and atraumatic  Right Ear: External ear normal    Left Ear: External ear normal    Cerumen impaction right ear   Eyes: Pupils are equal, round, and reactive to light  EOM are normal    Neck: Neck supple  Cardiovascular: Normal rate, regular rhythm and normal heart sounds  Pulmonary/Chest: Effort normal and breath sounds normal    Abdominal: Soft  Bowel sounds are normal    Musculoskeletal: Normal range of motion  Neurological: He is alert and oriented to person, place, and time  Skin: Skin is warm and dry  Psychiatric: He has a normal mood and affect  Thought content normal        BMI Counseling: Body mass index is 25 53 kg/m²  The BMI is above normal  No BMI follow-up plan is appropriate  Patient is 72 years old and weight reduction/weight gain would further complicate their underlying physical disability   Ear cerumen removal  Date/Time: 9/30/2019 1:47 PM  Performed by: Louise Spence MD  Authorized by: Louise Spence MD     Patient location:  Clinic  Other Assisting Provider: Li Consent:     Consent obtained:  Verbal    Consent given by:  Patient    Risks discussed:  Dizziness    Alternatives discussed:  No treatment  Procedure details:     Location:  R ear    Procedure type: curette    Post-procedure details:     Hearing quality:  Improved    Patient tolerance of procedure:   Tolerated well, no immediate complications

## 2019-09-30 NOTE — PROGRESS NOTES
Assessment and Plan:     Problem List Items Addressed This Visit     None      Visit Diagnoses     Medicare annual wellness visit, subsequent    -  Primary           Preventive health issues were discussed with patient, and age appropriate screening tests were ordered as noted in patient's After Visit Summary  Personalized health advice and appropriate referrals for health education or preventive services given if needed, as noted in patient's After Visit Summary       History of Present Illness:     Patient presents for Medicare Annual Wellness visit    Patient Care Team:  Shannon Wood DO as PCP - General (Family Medicine)  Mary Adams MD     Problem List:     Patient Active Problem List   Diagnosis    CAD (coronary artery disease)    Hyperlipidemia    Hypothyroidism    Diabetes mellitus, type 2 ([address] )    Chronic cough    Acid reflux disease    Benign prostatic hyperplasia    DDD (degenerative disc disease), lumbar    Diverticulosis    Vertigo    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, bilateral ([address] )    Anemia      Past Medical and Surgical History:     Past Medical History:   Diagnosis Date    BRBPR (bright red blood per rectum)     Last Assessed: 1/11/2016    Chest tightness or pressure     Last Assessed: 3/27/2014    Cholecystitis     Last Assessed: 9/1/2015    Diabetes mellitus ([address] )     Diverticulosis     Hearing loss     Hypothyroidism     Lyme disease     Shortness of breath     Last Assessed: 3/27/2014    Vertigo     Last Assessed: 12/31/2015     Past Surgical History:   Procedure Laterality Date    CATARACT EXTRACTION      Last Assessed: 11/11/2016    HERNIA REPAIR      LAPAROSCOPIC CHOLECYSTECTOMY      TONSILLECTOMY        Family History:     Family History   Problem Relation Age of Onset    No Known Problems Mother     Bipolar disorder Family         II    Tongue cancer Family     Prostate cancer Family     Other Family         cardiac disorder      Social History:     Social History     Socioeconomic History    Marital status:      Spouse name: None    Number of children: None    Years of education: None    Highest education level: None   Occupational History    None   Social Needs    Financial resource strain: None    Food insecurity:     Worry: None     Inability: None    Transportation needs:     Medical: None     Non-medical: None   Tobacco Use    Smoking status: Never Smoker    Smokeless tobacco: Never Used   Substance and Sexual Activity    Alcohol use: No    Drug use: No    Sexual activity: None   Lifestyle    Physical activity:     Days per week: None     Minutes per session: None    Stress: None   Relationships    Social connections:     Talks on phone: None     Gets together: None     Attends [employer] service: None     Active member of club or organization: None     Attends meetings of clubs or organizations: None     Relationship status: None    Intimate partner violence:     Fear of current or ex partner: None     Emotionally abused: None     Physically abused: None     Forced sexual activity: None   Other Topics Concern    None   Social History Narrative    None       Medications and Allergies:     Current Outpatient Medications   Medication Sig Dispense Refill    acetaminophen (TYLENOL) 325 mg tablet Take 650 mg by mouth every 6 (six) hours as needed for mild pain      aspirin (ASPIRIN LOW DOSE) 81 MG tablet Take 1 tablet by mouth daily      atorvastatin (LIPITOR) 10 mg tablet TAKE 1 TABLET DAILY   30 tablet 8    bisacodyl (DULCOLAX) 10 mg suppository Insert 10 mg into the rectum Every 4th Morning PRN      cyanocobalamin (VITAMIN B-12) 1,000 mcg tablet Take by mouth      famotidine (PEPCID) 40 MG tablet Take 1 tablet (40 mg total) by mouth daily 30 tablet 5    glucose blood (ONE TOUCH ULTRA TEST) test strip Check blood sugar bid 200 each 5    insulin NPH-insulin regular (HUMULIN 70/30) 100 units/mL subcutaneous injection Inject 15 units bid , ac 10 mL 5    Insulin Pen Needle (BD AUTOSHIELD DUO) 30G X 5 MM MISC by Does not apply route 2 (two) times a day 100 each 5    levothyroxine 125 mcg tablet Take 1 tablet (125 mcg total) by mouth daily 90 tablet 5    Magnesium Hydroxide (MILK OF MAGNESIA PO) Take by mouth daily as needed      meclizine (ANTIVERT) 12 5 MG tablet Take 1 tablet (12 5 mg total) by mouth every 8 (eight) hours as needed for dizziness 30 tablet 5    metFORMIN (GLUCOPHAGE-XR) 500 mg 24 hr tablet TAKE 1 TABLET BY MOUTH TWICE DAILY WITH MEALS 180 tablet 0    metoprolol succinate (TOPROL-XL) 25 mg 24 hr tablet Take 0 5 tablets (12 5 mg total) by mouth daily 45 tablet 3    nateglinide (STARLIX) 120 mg tablet TAKE 1 TABLET THREE TIMES A DAY BEFORE MEALS 270 tablet 3    nitroglycerin (NITROSTAT) 0 4 mg SL tablet Place 1 tablet under the tongue every 5 (five) minutes as needed      pioglitazone (ACTOS) 45 mg tablet Take 1 tablet by mouth daily      polyethylene glycol (MIRALAX) 17 g packet       potassium-sodium phosphateS (PHOSPHA 250 NEUTRAL) 155-852-130 mg tablet 1 tablet every morning        tamsulosin (FLOMAX) 0 4 mg Take 1 capsule (0 4 mg total) by mouth daily with dinner for 30 days 90 capsule 5     No current facility-administered medications for this visit  No Known Allergies   Immunizations:     Immunization History   Administered Date(s) Administered    INFLUENZA 10/14/2017, 08/28/2018    Influenza Split High Dose Preservative Free IM 09/13/2016, 08/28/2018    Pneumococcal Conjugate 13-Valent 09/13/2016    Pneumococcal Polysaccharide PPV23 10/15/2009, 10/19/2017      Health Maintenance: There are no preventive care reminders to display for this patient        Topic Date Due    HEPATITIS B VACCINES (1 of 3 - Risk 3-dose series) 07/31/1949    DTaP,Tdap,and Td Vaccines (1 - Tdap) 07/31/1951    INFLUENZA VACCINE  07/01/2019      Medicare Health Risk Assessment:     /60   Pulse 80   Temp 98 5 °F (36 9 °C)   Resp 16   Ht 5' 7" (1 702 m)   Wt 73 9 kg (163 lb)   SpO2 97%   BMI 25 53 kg/m²      William Mccain is here for his Subsequent Wellness visit  Last Medicare Wellness visit information reviewed, patient interviewed and updates made to the record today  Health Risk Assessment:   Patient rates overall health as good  Patient feels that their physical health rating is same  Eyesight was rated as same  Hearing was rated as same  Patient feels that their emotional and mental health rating is same  Pain experienced in the last 7 days has been none  Patient states that he has experienced no weight loss or gain in last 6 months  Depression Screening:   PHQ-2 Score: 0      Home Safety:  Patient does not have trouble with stairs inside or outside of their home  Patient has no working smoke alarms and has working carbon monoxide detector  Home safety hazards include: none  Nutrition:   Current diet is Regular  Medications:   Patient is currently taking over-the-counter supplements  OTC medications include: see medication list  Patient is able to manage medications  Activities of Daily Living (ADLs)/Instrumental Activities of Daily Living (IADLs):   Walk and transfer into and out of bed and chair?: Yes  Dress and groom yourself?: Yes    Bathe or shower yourself?: Yes    Feed yourself? Yes  Do your laundry/housekeeping?: Yes  Manage your money, pay your bills and track your expenses?: Yes  Make your own meals?: Yes    Do your own shopping?: Yes    Previous Hospitalizations:   Any hospitalizations or ED visits within the last 12 months?: No      Advance Care Planning:   Living will: Yes    Durable POA for healthcare:  Yes    Advanced directive: Yes    Advanced directive counseling given: Yes    End of Life Decisions reviewed with patient: Yes    Provider agrees with end of life decisions: Yes      PREVENTIVE SCREENINGS Cardiovascular Screening:    General: History Lipid Disorder and Screening Current      Diabetes Screening:     General: Screening Not Indicated, History Diabetes and Screening Current      Colorectal Cancer Screening:     General: Screening Not Indicated      Prostate Cancer Screening:    General: Screening Not Indicated      Osteoporosis Screening:    General: Risks and Benefits Discussed      Abdominal Aortic Aneurysm (AAA) Screening:        General: Screening Not Indicated      Lung Cancer Screening:     General: Screening Not Indicated      Hepatitis C Screening:    General: Screening Current      Ranjit Cobos MD

## 2019-09-30 NOTE — PATIENT INSTRUCTIONS

## 2019-10-03 LAB
LEFT EYE DIABETIC RETINOPATHY: NORMAL
RIGHT EYE DIABETIC RETINOPATHY: NORMAL

## 2019-11-13 LAB
LEFT EYE DIABETIC RETINOPATHY: NORMAL
RIGHT EYE DIABETIC RETINOPATHY: NORMAL

## 2019-11-29 DIAGNOSIS — K21.9 GASTROESOPHAGEAL REFLUX DISEASE WITHOUT ESOPHAGITIS: ICD-10-CM

## 2019-11-29 DIAGNOSIS — E11.9 TYPE 2 DIABETES MELLITUS WITHOUT COMPLICATION, WITHOUT LONG-TERM CURRENT USE OF INSULIN (HCC): ICD-10-CM

## 2019-11-30 RX ORDER — NATEGLINIDE 120 MG/1
120 TABLET ORAL
Qty: 270 TABLET | Refills: 3 | Status: SHIPPED | OUTPATIENT
Start: 2019-11-30 | End: 2020-10-22

## 2019-11-30 RX ORDER — FAMOTIDINE 40 MG/1
40 TABLET, FILM COATED ORAL DAILY
Qty: 30 TABLET | Refills: 5 | Status: SHIPPED | OUTPATIENT
Start: 2019-11-30 | End: 2019-12-02 | Stop reason: SDUPTHER

## 2019-12-02 DIAGNOSIS — E11.9 TYPE 2 DIABETES MELLITUS WITHOUT COMPLICATION, WITHOUT LONG-TERM CURRENT USE OF INSULIN (HCC): ICD-10-CM

## 2019-12-02 DIAGNOSIS — K21.9 GASTROESOPHAGEAL REFLUX DISEASE WITHOUT ESOPHAGITIS: ICD-10-CM

## 2019-12-02 RX ORDER — FAMOTIDINE 40 MG/1
40 TABLET, FILM COATED ORAL DAILY
Qty: 30 TABLET | Refills: 5 | Status: SHIPPED | OUTPATIENT
Start: 2019-12-02 | End: 2020-12-09

## 2019-12-02 RX ORDER — METFORMIN HYDROCHLORIDE 500 MG/1
500 TABLET, EXTENDED RELEASE ORAL 2 TIMES DAILY WITH MEALS
Qty: 180 TABLET | Refills: 0 | Status: SHIPPED | OUTPATIENT
Start: 2019-12-02 | End: 2020-06-05

## 2019-12-03 DIAGNOSIS — N40.0 BENIGN PROSTATIC HYPERPLASIA: ICD-10-CM

## 2019-12-03 RX ORDER — TAMSULOSIN HYDROCHLORIDE 0.4 MG/1
0.4 CAPSULE ORAL
Qty: 90 CAPSULE | Refills: 5 | Status: SHIPPED | OUTPATIENT
Start: 2019-12-03 | End: 2021-01-01

## 2020-01-13 LAB
LEFT EYE DIABETIC RETINOPATHY: NORMAL
RIGHT EYE DIABETIC RETINOPATHY: NORMAL

## 2020-01-24 LAB
CREAT ?TM UR-SCNC: 154 UMOL/L
EXT MICROALBUMIN URINE RANDOM: 5.2
HBA1C MFR BLD HPLC: 7.4 %
MICROALBUMIN/CREAT UR: 33.8 MG/G{CREAT}

## 2020-01-30 ENCOUNTER — OFFICE VISIT (OUTPATIENT)
Dept: FAMILY MEDICINE CLINIC | Facility: CLINIC | Age: 85
End: 2020-01-30
Payer: MEDICARE

## 2020-01-30 VITALS
OXYGEN SATURATION: 96 % | RESPIRATION RATE: 16 BRPM | SYSTOLIC BLOOD PRESSURE: 106 MMHG | WEIGHT: 168 LBS | BODY MASS INDEX: 26.37 KG/M2 | HEART RATE: 76 BPM | HEIGHT: 67 IN | TEMPERATURE: 98.7 F | DIASTOLIC BLOOD PRESSURE: 60 MMHG

## 2020-01-30 DIAGNOSIS — E03.9 HYPOTHYROIDISM, UNSPECIFIED TYPE: ICD-10-CM

## 2020-01-30 DIAGNOSIS — E11.3313 TYPE 2 DIABETES MELLITUS WITH BOTH EYES AFFECTED BY MODERATE NONPROLIFERATIVE RETINOPATHY AND MACULAR EDEMA, WITH LONG-TERM CURRENT USE OF INSULIN (HCC): Primary | ICD-10-CM

## 2020-01-30 DIAGNOSIS — E78.5 HYPERLIPIDEMIA, UNSPECIFIED HYPERLIPIDEMIA TYPE: ICD-10-CM

## 2020-01-30 DIAGNOSIS — M54.12 CERVICAL RADICULOPATHY: ICD-10-CM

## 2020-01-30 DIAGNOSIS — E11.9 TYPE 2 DIABETES MELLITUS WITHOUT COMPLICATION, WITHOUT LONG-TERM CURRENT USE OF INSULIN (HCC): ICD-10-CM

## 2020-01-30 DIAGNOSIS — I25.10 CORONARY ARTERY DISEASE INVOLVING NATIVE CORONARY ARTERY OF NATIVE HEART WITHOUT ANGINA PECTORIS: ICD-10-CM

## 2020-01-30 DIAGNOSIS — Z79.4 TYPE 2 DIABETES MELLITUS WITH BOTH EYES AFFECTED BY MODERATE NONPROLIFERATIVE RETINOPATHY AND MACULAR EDEMA, WITH LONG-TERM CURRENT USE OF INSULIN (HCC): Primary | ICD-10-CM

## 2020-01-30 PROBLEM — H61.21 IMPACTED CERUMEN OF RIGHT EAR: Status: RESOLVED | Noted: 2019-09-30 | Resolved: 2020-01-30

## 2020-01-30 PROCEDURE — 99214 OFFICE O/P EST MOD 30 MIN: CPT | Performed by: FAMILY MEDICINE

## 2020-01-30 NOTE — PROGRESS NOTES
Assessment/Plan:  Return visit in 4 months with fasting blood work prior to visit     Diagnoses and all orders for this visit:    Type 2 diabetes mellitus with both eyes affected by moderate nonproliferative retinopathy and macular edema, with long-term current use of insulin ([facility])  -     Hemoglobin A1C; Future    Hypothyroidism, unspecified type  -     TSH, 3rd generation with Free T4 reflex; Future    Coronary artery disease involving native coronary artery of native heart without angina pectoris    Hyperlipidemia, unspecified hyperlipidemia type  -     CBC and differential; Future  -     Comprehensive metabolic panel; Future  -     Lipid panel; Future    Type 2 diabetes mellitus without complication, without long-term current use of insulin ([facility])    Cervical radiculopathy        Type 2 diabetes mellitus with both eyes affected by moderate nonproliferative retinopathy and macular edema, with long-term current use of insulin ([facility])    Continue Humulin 70/30 30 units in a m  and 15 units in p m  Raji Roubarbara Benign prostatic hyperplasia  Continue Flomax    Acid reflux disease  Continue Pepcid 40 mg daily    Hypothyroidism  Continue levothyroxine 125 mcg daily    Cervical radiculopathy  Trial of Aleve p m  Hyperlipidemia  Continue Lipitor 10 mg daily         Subjective:      Patient ID: Preethi Cameron is a 80 y o  male  Patient comes in for checkup  He complains of neck pain with radiation down his left arm  The following portions of the patient's history were reviewed and updated as appropriate:   He has a past medical history of BRBPR (bright red blood per rectum), Chest tightness or pressure, Cholecystitis, Diabetes mellitus ([address] ), Diverticulosis, Hearing loss, Hypothyroidism, Lyme disease, Shortness of breath, and Vertigo  ,  does not have any pertinent problems on file  ,   has a past surgical history that includes Cataract extraction; Laparoscopic cholecystectomy; Hernia repair; and Tonsillectomy  ,  family history includes Bipolar disorder in his family; No Known Problems in his mother; Other in his family; Prostate cancer in his family; Tongue cancer in his family  ,   reports that he has never smoked  He has never used smokeless tobacco  He reports that he does not drink alcohol or use drugs  ,  has No Known Allergies     Current Outpatient Medications   Medication Sig Dispense Refill    acetaminophen (TYLENOL) 325 mg tablet Take 650 mg by mouth every 6 (six) hours as needed for mild pain      aspirin (ASPIRIN LOW DOSE) 81 MG tablet Take 1 tablet by mouth daily      atorvastatin (LIPITOR) 10 mg tablet TAKE 1 TABLET DAILY   30 tablet 8    bisacodyl (DULCOLAX) 10 mg suppository Insert 10 mg into the rectum Every 4th Morning PRN      cyanocobalamin (VITAMIN B-12) 1,000 mcg tablet Take by mouth      famotidine (PEPCID) 40 MG tablet Take 1 tablet (40 mg total) by mouth daily 30 tablet 5    glucose blood (ONE TOUCH ULTRA TEST) test strip Check blood sugar bid 200 each 5    insulin isophane-insulin regular (HUMULIN 70/30 KWIKPEN) 100 units/mL injection pen Inject 30 units q am and 15 units q 3 pm 5 pen 5    Insulin Pen Needle (BD AUTOSHIELD DUO) 30G X 5 MM MISC by Does not apply route 2 (two) times a day 100 each 5    levothyroxine 125 mcg tablet Take 1 tablet (125 mcg total) by mouth daily 90 tablet 5    Magnesium Hydroxide (MILK OF MAGNESIA PO) Take by mouth daily as needed      meclizine (ANTIVERT) 12 5 MG tablet Take 1 tablet (12 5 mg total) by mouth every 8 (eight) hours as needed for dizziness 30 tablet 5    metFORMIN (GLUCOPHAGE-XR) 500 mg 24 hr tablet Take 1 tablet (500 mg total) by mouth 2 (two) times a day with meals 180 tablet 0    metoprolol succinate (TOPROL-XL) 25 mg 24 hr tablet Take 0 5 tablets (12 5 mg total) by mouth daily 45 tablet 3    nateglinide (STARLIX) 120 mg tablet Take 1 tablet (120 mg total) by mouth 3 (three) times a day before meals 270 tablet 3    nitroglycerin (NITROSTAT) 0 4 mg SL tablet Place 1 tablet under the tongue every 5 (five) minutes as needed      pioglitazone (ACTOS) 45 mg tablet Take 1 tablet by mouth daily      polyethylene glycol (MIRALAX) 17 g packet       potassium-sodium phosphateS (PHOSPHA 250 NEUTRAL) 155-852-130 mg tablet 1 tablet every morning        tamsulosin (FLOMAX) 0 4 mg Take 1 capsule (0 4 mg total) by mouth daily with dinner 90 capsule 5     No current facility-administered medications for this visit  Review of Systems   Constitutional: Negative  HENT: Negative  Respiratory: Negative  Cardiovascular: Negative  Objective:  Vitals:    01/30/20 1323   BP: 106/60   Pulse: 76   Resp: 16   Temp: 98 7 °F (37 1 °C)   SpO2: 96%   Weight: 76 2 kg (168 lb)   Height: 5' 7" (1 702 m)     Body mass index is 26 31 kg/m²  Physical Exam   Constitutional: He is oriented to person, place, and time  He appears well-developed and well-nourished  HENT:   Head: Normocephalic and atraumatic  Right Ear: Tympanic membrane and external ear normal    Left Ear: Tympanic membrane and external ear normal    Eyes: Pupils are equal, round, and reactive to light  EOM are normal    Neck: Neck supple  Cardiovascular: Normal rate, regular rhythm and normal heart sounds  Pulses are no weak pulses  Pulses:       Dorsalis pedis pulses are 2+ on the right side, and 2+ on the left side  Posterior tibial pulses are 2+ on the right side, and 2+ on the left side  Pulmonary/Chest: Effort normal and breath sounds normal    Abdominal: Soft  Bowel sounds are normal    Musculoskeletal: Normal range of motion  Feet:   Right Foot:   Skin Integrity: Negative for ulcer, skin breakdown, erythema, warmth, callus or dry skin  Neurological: He is alert and oriented to person, place, and time  Skin: Skin is warm and dry  Psychiatric: He has a normal mood and affect  Thought content normal      Diabetic Foot Exam    Patient's shoes and socks removed  Right Foot/Ankle   Right Foot Inspection  Skin Exam: skin normal and skin intact no dry skin, no warmth, no callus, no erythema, no maceration, no abnormal color, no pre-ulcer, no ulcer and no callus                            Sensory   Vibration: intact  Proprioception: intact   Monofilament testing: intact  Vascular    The right DP pulse is 2+  The right PT pulse is 2+  Left Foot/Ankle  Left Foot Inspection                                           Sensory   Vibration: intact  Proprioception: intact  Monofilament: intact  Vascular    The left DP pulse is 2+  The left PT pulse is 2+  Assign Risk Category:  No deformity present; No loss of protective sensation;  No weak pulses       Risk: 0

## 2020-02-10 DIAGNOSIS — I25.10 CORONARY ARTERY DISEASE INVOLVING NATIVE CORONARY ARTERY OF NATIVE HEART WITHOUT ANGINA PECTORIS: ICD-10-CM

## 2020-02-10 RX ORDER — METOPROLOL SUCCINATE 25 MG/1
TABLET, EXTENDED RELEASE ORAL
Qty: 45 TABLET | Refills: 0 | Status: SHIPPED | OUTPATIENT
Start: 2020-02-10 | End: 2020-03-04 | Stop reason: SDUPTHER

## 2020-02-24 LAB
LEFT EYE DIABETIC RETINOPATHY: NORMAL
RIGHT EYE DIABETIC RETINOPATHY: NORMAL

## 2020-02-25 ENCOUNTER — TELEPHONE (OUTPATIENT)
Dept: ADMINISTRATIVE | Facility: OTHER | Age: 85
End: 2020-02-25

## 2020-02-25 NOTE — TELEPHONE ENCOUNTER
Upon review of your request/inquiry, we most recent DM eye exam 1/13/20 HM already up to date    Any additional questions or concerns should be emailed to the Practice Liaisons via [email or web address] email, please do not reply via In Basket       Thank you  Allan Starks, [address]

## 2020-03-04 ENCOUNTER — OFFICE VISIT (OUTPATIENT)
Dept: CARDIOLOGY CLINIC | Facility: CLINIC | Age: 85
End: 2020-03-04
Payer: MEDICARE

## 2020-03-04 VITALS
DIASTOLIC BLOOD PRESSURE: 76 MMHG | SYSTOLIC BLOOD PRESSURE: 136 MMHG | WEIGHT: 166 LBS | BODY MASS INDEX: 26.06 KG/M2 | HEART RATE: 81 BPM | HEIGHT: 67 IN | OXYGEN SATURATION: 98 %

## 2020-03-04 DIAGNOSIS — E78.5 HLD (HYPERLIPIDEMIA): ICD-10-CM

## 2020-03-04 DIAGNOSIS — R94.39 ABNORMAL STRESS TEST: Primary | ICD-10-CM

## 2020-03-04 DIAGNOSIS — I25.10 CORONARY ARTERY DISEASE INVOLVING NATIVE CORONARY ARTERY OF NATIVE HEART WITHOUT ANGINA PECTORIS: ICD-10-CM

## 2020-03-04 DIAGNOSIS — E78.2 MIXED HYPERLIPIDEMIA: ICD-10-CM

## 2020-03-04 PROCEDURE — 3051F HG A1C>EQUAL 7.0%<8.0%: CPT | Performed by: INTERNAL MEDICINE

## 2020-03-04 PROCEDURE — 1036F TOBACCO NON-USER: CPT | Performed by: INTERNAL MEDICINE

## 2020-03-04 PROCEDURE — 99214 OFFICE O/P EST MOD 30 MIN: CPT | Performed by: INTERNAL MEDICINE

## 2020-03-04 PROCEDURE — 1160F RVW MEDS BY RX/DR IN RCRD: CPT | Performed by: INTERNAL MEDICINE

## 2020-03-04 PROCEDURE — 4040F PNEUMOC VAC/ADMIN/RCVD: CPT | Performed by: INTERNAL MEDICINE

## 2020-03-04 PROCEDURE — 3008F BODY MASS INDEX DOCD: CPT | Performed by: INTERNAL MEDICINE

## 2020-03-04 PROCEDURE — 2026F EYE IMG VALID EVC RTNOPTHY: CPT | Performed by: INTERNAL MEDICINE

## 2020-03-04 RX ORDER — METOPROLOL SUCCINATE 25 MG/1
25 TABLET, EXTENDED RELEASE ORAL DAILY
Qty: 90 TABLET | Refills: 3 | Status: SHIPPED | OUTPATIENT
Start: 2020-03-04 | End: 2020-11-24 | Stop reason: SDUPTHER

## 2020-03-04 RX ORDER — ATORVASTATIN CALCIUM 20 MG/1
20 TABLET, FILM COATED ORAL DAILY
Qty: 90 TABLET | Refills: 3 | Status: SHIPPED | OUTPATIENT
Start: 2020-03-04 | End: 2021-01-01

## 2020-03-04 RX ORDER — OMEPRAZOLE 20 MG/1
20 CAPSULE, DELAYED RELEASE ORAL DAILY
COMMUNITY
End: 2021-01-01

## 2020-03-04 NOTE — PROGRESS NOTES
CARDIOLOGY OFFICE VISIT  [facility]  [address], [facility] Doris Alan  Tel: [phone]      NAME: Ledy Keys  AGE: 80 y o  SEX: male  : 1930   MRN: [id number]      Chief Complaint:  Chief Complaint   Patient presents with    Follow-up         History of Present Illness:   Patient comes for follow up  States he is doing well from cardiac stand point and denies chest pain / pressure, SOB, palpitations, lightheadedness, syncope, swelling feet, orthopnea, PND, claudication  Presumed CAD -  States is doing well cardiac wise with no cardiac symptoms  Taking all medications regularly  Has not used SL NTG since the last clinic visit  HLP -  Has had hyperlipidemia for many years  Taking statin regularly along with diet control  Denies myalgia  PCP closely monitoring the blood work  Past Medical History:  Past Medical History:   Diagnosis Date    BRBPR (bright red blood per rectum)     Last Assessed: 2016    Chest tightness or pressure     Last Assessed: 3/27/2014    Cholecystitis     Last Assessed: 2015    Diabetes mellitus ([facility] [address] )     Diverticulosis     Hearing loss     Hypothyroidism     Lyme disease     Shortness of breath     Last Assessed: 3/27/2014    Vertigo     Last Assessed: 2015         Past Surgical History:  Past Surgical History:   Procedure Laterality Date    CATARACT EXTRACTION      Last Assessed: 2016    HERNIA REPAIR      LAPAROSCOPIC CHOLECYSTECTOMY      TONSILLECTOMY           Family History:  Family History   Problem Relation Age of Onset    No Known Problems Mother     Bipolar disorder Family         II    Tongue cancer Family     Prostate cancer Family     Other Family         cardiac disorder         Social History:  Social History     Socioeconomic History    Marital status:       Spouse name: None    Number of children: None    Years of education: None    Highest education level: None   Occupational History    None   Social Needs    Financial resource strain: None    Food insecurity:     Worry: None     Inability: None    Transportation needs:     Medical: None     Non-medical: None   Tobacco Use    Smoking status: Never Smoker    Smokeless tobacco: Never Used   Substance and Sexual Activity    Alcohol use: No    Drug use: No    Sexual activity: None   Lifestyle    Physical activity:     Days per week: None     Minutes per session: None    Stress: None   Relationships    Social connections:     Talks on phone: None     Gets together: None     Attends Congregational service: None     Active member of club or organization: None     Attends meetings of clubs or organizations: None     Relationship status: None    Intimate partner violence:     Fear of current or ex partner: None     Emotionally abused: None     Physically abused: None     Forced sexual activity: None   Other Topics Concern    None   Social History Narrative    None         Active Problems:  Patient Active Problem List   Diagnosis    CAD (coronary artery disease)    Hyperlipidemia    Hypothyroidism    Chronic cough    Acid reflux disease    Benign prostatic hyperplasia    DDD (degenerative disc disease), lumbar    Diverticulosis    Vertigo    Type 2 diabetes mellitus with both eyes affected by moderate nonproliferative retinopathy and macular edema, with long-term current use of insulin (HCC)    Anemia    Cervical radiculopathy         The following portions of the patient's history were reviewed and updated as appropriate: past medical history, past surgical history, past family history,  past social history, current medications, allergies and problem list       Review of Systems:  Constitutional: Denies fever, chills, fatigue  Eyes: Denies eye redness, eye discharge, double vision  ENT: Denies hearing loss, tinnitus, sneezing, nasal discharge, sore throat Respiratory: Denies cough, expectoration, hemoptysis, shortness of breath  Cardiovascular: Denies chest pain, palpitations, orthopnea, PND, lower extremity swelling  Gastrointestinal: Denies abdominal pain, nausea, vomiting, hematemesis, diarrhea, bloody stools  Genito-Urinary: Denies dysuria, incontinence  Musculoskeletal: Denies back pain  Neurologic: Denies confusion, lightheadedness, syncope, headache, focal weakness, sensory changes, seizures  Endocrine: Denies polyuria, polydipsia, temperature intolerance  Allergy and Immunology: Denies hives, insect bite sensitivity  Hematological and Lymphatic: Denies bleeding problems, swollen glands   Psychological: Denies depression, suicidal ideation, anxiety, panic  Dermatological: Denies pruritus, rash, skin lesion changes      Vitals:  Vitals:    03/04/20 1104   BP: 136/76   Pulse: 81   SpO2: 98%       Body mass index is 26 kg/m²  Weight (last 2 days)     Date/Time   Weight    03/04/20 1104   75 3 (166)                Physical Examination:  General: Patient is not in acute distress  Awake, alert, oriented in time, place and person  Responding to commands  Head: Normocephalic  Atraumatic  Eyes: Both pupils normal sized, round and reactive to light  Nonicteric  ENT: Normal external ear canals  Nares normal, no drainage  Lips and oral mucosa normal  Neck: Supple  JVP not raised  Trachea central  No thyromegaly  Lungs: Bilateral bronchovascular breath sounds with no crackles or rhonchi  Chest wall: No tenderness  Cardiovascular: RRR  S1 and S2 normal  No murmur, rub or gallop  Gastrointestinal: Abdomen soft, nontender  No guarding or rigidity  Liver and spleen not palpable  Bowel sounds present  Neurologic: Patient is awake, alert, oriented in time, place and person  Responding to command  Moving all extremities  Integumentary:  No skin rash  Lymphatic: No cervical lymphadenopathy  Back: Symmetric   No CVA tenderness  Extremities: No clubbing, cyanosis or edema      Laboratory Results:  CBC with diff:   Lab Results   Component Value Date    WBC 6 84 01/03/2019    WBC 6 1 05/11/2016    RBC 4 12 01/03/2019    RBC 4 34 05/11/2016    HGB 12 5 01/03/2019    HGB 12 5 (L) 05/11/2016    HCT 38 5 01/03/2019    HCT 39 1 05/11/2016    MCV 93 01/03/2019    MCV 90 0 05/11/2016    MCH 30 3 01/03/2019    MCH 28 8 05/11/2016    RDW 12 6 01/03/2019    RDW 15 3 (H) 05/11/2016     01/03/2019     05/11/2016       CMP:  Lab Results   Component Value Date    CREATININE 0 92 01/03/2019    CREATININE 1 00 08/15/2017    BUN 18 01/03/2019    BUN 16 05/15/2018     08/15/2017    K 4 3 01/03/2019    K 4 7 05/15/2018     01/03/2019     05/15/2018    CO2 28 01/03/2019    CO2 27 05/15/2018    PROT 7 6 08/15/2017    ALKPHOS 73 01/02/2019    ALKPHOS 61 05/15/2018    ALT 22 01/02/2019    ALT 16 05/15/2018    AST 19 01/02/2019    AST 19 05/15/2018    BILIDIR 0 08 01/02/2019       Lab Results   Component Value Date    HGBA1C 7 4 01/24/2020    HGBA1C 11 3 (H) 12/04/2018    HGBA1C 10 0 (H) 05/15/2018       Lab Results   Component Value Date    TROPONINI <0 02 01/02/2019    TROPONINI <0 02 12/04/2018       Lipid Profile:   Lab Results   Component Value Date    CHOL 155 08/15/2017    CHOL 189 05/15/2017    CHOL 187 09/09/2016     Lab Results   Component Value Date    HDL 36 (L) 05/15/2018    HDL 41 08/15/2017    HDL 37 (L) 05/15/2017     No results found for: [facility]  Lab Results   Component Value Date    TRIG 162 (H) 05/15/2018    TRIG 131 08/15/2017    TRIG 170 (H) 05/15/2017         Medications:    Current Outpatient Medications:     acetaminophen (TYLENOL) 325 mg tablet, Take 650 mg by mouth every 6 (six) hours as needed for mild pain, Disp: , Rfl:     aspirin (ASPIRIN LOW DOSE) 81 MG tablet, Take 1 tablet by mouth daily, Disp: , Rfl:     atorvastatin (LIPITOR) 10 mg tablet, TAKE 1 TABLET DAILY  , Disp: 30 tablet, Rfl: 8    bisacodyl (DULCOLAX) 10 mg suppository, Insert 10 mg into the rectum Every 4th Morning PRN, Disp: , Rfl:     cyanocobalamin (VITAMIN B-12) 1,000 mcg tablet, Take by mouth, Disp: , Rfl:     glucose blood (ONE TOUCH ULTRA TEST) test strip, Check blood sugar bid, Disp: 200 each, Rfl: 5    insulin isophane-insulin regular (HUMULIN 70/30 KWIKPEN) 100 units/mL injection pen, Inject 30 units q am and 15 units q 3 pm, Disp: 5 pen, Rfl: 5    Insulin Pen Needle (BD AUTOSHIELD DUO) 30G X 5 MM MISC, by Does not apply route 2 (two) times a day, Disp: 100 each, Rfl: 5    levothyroxine 125 mcg tablet, Take 1 tablet (125 mcg total) by mouth daily, Disp: 90 tablet, Rfl: 5    Magnesium Hydroxide (MILK OF MAGNESIA PO), Take by mouth daily as needed, Disp: , Rfl:     meclizine (ANTIVERT) 12 5 MG tablet, Take 1 tablet (12 5 mg total) by mouth every 8 (eight) hours as needed for dizziness, Disp: 30 tablet, Rfl: 5    metFORMIN (GLUCOPHAGE-XR) 500 mg 24 hr tablet, Take 1 tablet (500 mg total) by mouth 2 (two) times a day with meals, Disp: 180 tablet, Rfl: 0    metoprolol succinate (TOPROL-XL) 25 mg 24 hr tablet, TAKE 1/2 (ONE-HALF) TABLET BY MOUTH ONCE DAILY, Disp: 45 tablet, Rfl: 0    nateglinide (STARLIX) 120 mg tablet, Take 1 tablet (120 mg total) by mouth 3 (three) times a day before meals, Disp: 270 tablet, Rfl: 3    omeprazole (PriLOSEC) 20 mg delayed release capsule, Take 20 mg by mouth daily, Disp: , Rfl:     pioglitazone (ACTOS) 45 mg tablet, Take 1 tablet by mouth daily, Disp: , Rfl:     tamsulosin (FLOMAX) 0 4 mg, Take 1 capsule (0 4 mg total) by mouth daily with dinner, Disp: 90 capsule, Rfl: 5    famotidine (PEPCID) 40 MG tablet, Take 1 tablet (40 mg total) by mouth daily (Patient not taking: Reported on 3/4/2020), Disp: 30 tablet, Rfl: 5    nitroglycerin (NITROSTAT) 0 4 mg SL tablet, Place 1 tablet under the tongue every 5 (five) minutes as needed, Disp: , Rfl:     polyethylene glycol (MIRALAX) 17 g packet, , Disp: , Rfl:     potassium-sodium phosphateS (PHOSPHA 250 NEUTRAL) 155-852-130 mg tablet, 1 tablet every morning  , Disp: , Rfl:       Allergies:  No Known Allergies      Assessment and Plan:  1  Abnormal stress test - Presumed CAD  Pt's pharmacologic stress test had shown both a fixed and a small reversible defect  I had discussed the option of cardiac catheterization (to confirm the diagnosis + possible PCI) versus medical therapy  This risks, benefits, alternatives of both therapies were discussed  Patient chose the medical therapy, saying that he would try medications first and if they did not work, he would reconsider getting cardiac catheterization  At this age he preferred medications over procedures  Pt to continue his aspirin, Metoprolol succinate, Statin, SL NTG    2  Mixed hyperlipidemia   continue statin and diet control  His PCP closely monitors his blood work  Last lipid panel showed LDL was elevated so his atorvastatin dose has been increased from 10 mg to 20 mg daily    Recommend aggressive risk factor modification and therapeutic lifestyle changes  Low-salt, low-calorie, low-fat, low-cholesterol diet with regular exercise and to optimize weight  I will defer the ordering and monitoring of necessity lab studies to you, but I am available and happy to review and manage any of the data at your request in the future  Discussed concepts of atherosclerosis, including signs and symptoms of cardiac disease  Previous studies were reviewed  Safety measures were reviewed  Questions were entertained and answered  Patient was advised to report any problems requiring medical attention  Follow-up with PCP and appropriate specialist and lab work as discussed  Return for follow up visit as scheduled or earlier, if needed  Thank you for allowing me to participate in the care and evaluation of your patient  Should you have any questions, please feel free to contact me        Allie Belle MD  3/4/2020,12:23 PM

## 2020-03-09 DIAGNOSIS — E03.9 HYPOTHYROIDISM, UNSPECIFIED TYPE: ICD-10-CM

## 2020-03-09 RX ORDER — LEVOTHYROXINE SODIUM 0.12 MG/1
TABLET ORAL
Qty: 90 TABLET | Refills: 0 | Status: SHIPPED | OUTPATIENT
Start: 2020-03-09 | End: 2020-05-22

## 2020-04-02 DIAGNOSIS — E11.3313 TYPE 2 DIABETES MELLITUS WITH BOTH EYES AFFECTED BY MODERATE NONPROLIFERATIVE RETINOPATHY AND MACULAR EDEMA, WITHOUT LONG-TERM CURRENT USE OF INSULIN (HCC): ICD-10-CM

## 2020-04-02 RX ORDER — HUMAN INSULIN 100 [IU]/ML
INJECTION, SUSPENSION SUBCUTANEOUS
Qty: 15 ML | Refills: 0 | Status: SHIPPED | OUTPATIENT
Start: 2020-04-02 | End: 2020-05-07

## 2020-04-27 ENCOUNTER — TELEMEDICINE (OUTPATIENT)
Dept: FAMILY MEDICINE CLINIC | Facility: CLINIC | Age: 85
End: 2020-04-27
Payer: MEDICARE

## 2020-04-27 VITALS — WEIGHT: 165 LBS | BODY MASS INDEX: 25.9 KG/M2 | HEIGHT: 67 IN

## 2020-04-27 DIAGNOSIS — Z79.4 TYPE 2 DIABETES MELLITUS WITH BOTH EYES AFFECTED BY MODERATE NONPROLIFERATIVE RETINOPATHY AND MACULAR EDEMA, WITH LONG-TERM CURRENT USE OF INSULIN (HCC): ICD-10-CM

## 2020-04-27 DIAGNOSIS — E11.3313 TYPE 2 DIABETES MELLITUS WITH BOTH EYES AFFECTED BY MODERATE NONPROLIFERATIVE RETINOPATHY AND MACULAR EDEMA, WITH LONG-TERM CURRENT USE OF INSULIN (HCC): ICD-10-CM

## 2020-04-27 DIAGNOSIS — E16.2 HYPOGLYCEMIA: Primary | ICD-10-CM

## 2020-04-27 PROCEDURE — 99442 PR PHYS/QHP TELEPHONE EVALUATION 11-20 MIN: CPT | Performed by: FAMILY MEDICINE

## 2020-05-07 DIAGNOSIS — E11.3313 TYPE 2 DIABETES MELLITUS WITH BOTH EYES AFFECTED BY MODERATE NONPROLIFERATIVE RETINOPATHY AND MACULAR EDEMA, WITHOUT LONG-TERM CURRENT USE OF INSULIN (HCC): ICD-10-CM

## 2020-05-07 RX ORDER — HUMAN INSULIN 100 [IU]/ML
INJECTION, SUSPENSION SUBCUTANEOUS
Qty: 15 ML | Refills: 0 | Status: SHIPPED | OUTPATIENT
Start: 2020-05-07 | End: 2020-06-15

## 2020-05-22 DIAGNOSIS — E03.9 HYPOTHYROIDISM, UNSPECIFIED TYPE: ICD-10-CM

## 2020-05-22 RX ORDER — LEVOTHYROXINE SODIUM 0.12 MG/1
TABLET ORAL
Qty: 90 TABLET | Refills: 5 | Status: SHIPPED | OUTPATIENT
Start: 2020-05-22 | End: 2021-01-01

## 2020-06-03 LAB
LEFT EYE DIABETIC RETINOPATHY: NORMAL
RIGHT EYE DIABETIC RETINOPATHY: NORMAL

## 2020-06-05 DIAGNOSIS — E11.9 TYPE 2 DIABETES MELLITUS WITHOUT COMPLICATION, WITHOUT LONG-TERM CURRENT USE OF INSULIN (HCC): ICD-10-CM

## 2020-06-05 RX ORDER — BLOOD SUGAR DIAGNOSTIC
STRIP MISCELLANEOUS
Qty: 150 EACH | Refills: 0 | Status: SHIPPED | OUTPATIENT
Start: 2020-06-05 | End: 2020-07-28 | Stop reason: SDUPTHER

## 2020-06-05 RX ORDER — METFORMIN HYDROCHLORIDE 500 MG/1
TABLET, EXTENDED RELEASE ORAL
Qty: 180 TABLET | Refills: 0 | Status: SHIPPED | OUTPATIENT
Start: 2020-06-05 | End: 2020-06-25

## 2020-06-13 DIAGNOSIS — E11.3313 TYPE 2 DIABETES MELLITUS WITH BOTH EYES AFFECTED BY MODERATE NONPROLIFERATIVE RETINOPATHY AND MACULAR EDEMA, WITHOUT LONG-TERM CURRENT USE OF INSULIN (HCC): ICD-10-CM

## 2020-06-15 RX ORDER — HUMAN INSULIN 100 [IU]/ML
INJECTION, SUSPENSION SUBCUTANEOUS
Qty: 15 ML | Refills: 0 | Status: SHIPPED | OUTPATIENT
Start: 2020-06-15 | End: 2020-06-16 | Stop reason: SDUPTHER

## 2020-06-16 ENCOUNTER — TELEPHONE (OUTPATIENT)
Dept: FAMILY MEDICINE CLINIC | Facility: CLINIC | Age: 85
End: 2020-06-16

## 2020-06-16 ENCOUNTER — NURSE TRIAGE (OUTPATIENT)
Dept: OTHER | Facility: OTHER | Age: 85
End: 2020-06-16

## 2020-06-16 DIAGNOSIS — E11.3313 TYPE 2 DIABETES MELLITUS WITH BOTH EYES AFFECTED BY MODERATE NONPROLIFERATIVE RETINOPATHY AND MACULAR EDEMA, WITHOUT LONG-TERM CURRENT USE OF INSULIN (HCC): ICD-10-CM

## 2020-06-23 ENCOUNTER — TELEPHONE (OUTPATIENT)
Dept: FAMILY MEDICINE CLINIC | Facility: CLINIC | Age: 85
End: 2020-06-23

## 2020-06-25 DIAGNOSIS — E11.9 TYPE 2 DIABETES MELLITUS WITHOUT COMPLICATION, WITHOUT LONG-TERM CURRENT USE OF INSULIN (HCC): ICD-10-CM

## 2020-06-25 RX ORDER — METFORMIN HYDROCHLORIDE 500 MG/1
TABLET, EXTENDED RELEASE ORAL
Qty: 180 TABLET | Refills: 0 | Status: SHIPPED | OUTPATIENT
Start: 2020-06-25 | End: 2020-09-21

## 2020-07-16 DIAGNOSIS — E11.3313 TYPE 2 DIABETES MELLITUS WITH BOTH EYES AFFECTED BY MODERATE NONPROLIFERATIVE RETINOPATHY AND MACULAR EDEMA, WITHOUT LONG-TERM CURRENT USE OF INSULIN (HCC): ICD-10-CM

## 2020-07-16 RX ORDER — HUMAN INSULIN 100 [IU]/ML
INJECTION, SUSPENSION SUBCUTANEOUS
Qty: 15 ML | Refills: 0 | Status: SHIPPED | OUTPATIENT
Start: 2020-07-16 | End: 2020-08-04 | Stop reason: SDUPTHER

## 2020-07-21 LAB — HBA1C MFR BLD HPLC: 8.8 %

## 2020-07-28 ENCOUNTER — TELEPHONE (OUTPATIENT)
Dept: FAMILY MEDICINE CLINIC | Facility: CLINIC | Age: 85
End: 2020-07-28

## 2020-07-28 DIAGNOSIS — E11.9 TYPE 2 DIABETES MELLITUS WITHOUT COMPLICATION, WITHOUT LONG-TERM CURRENT USE OF INSULIN (HCC): ICD-10-CM

## 2020-07-28 NOTE — TELEPHONE ENCOUNTER
[facility] called and for pt prescription one touch ultra test strip [facility] will only pay for 50-twice per day with refill  Please change pt prescription

## 2020-07-29 LAB
LEFT EYE DIABETIC RETINOPATHY: NORMAL
RIGHT EYE DIABETIC RETINOPATHY: NORMAL

## 2020-08-04 ENCOUNTER — OFFICE VISIT (OUTPATIENT)
Dept: FAMILY MEDICINE CLINIC | Facility: CLINIC | Age: 85
End: 2020-08-04
Payer: MEDICARE

## 2020-08-04 VITALS
OXYGEN SATURATION: 98 % | TEMPERATURE: 97.3 F | SYSTOLIC BLOOD PRESSURE: 114 MMHG | HEIGHT: 67 IN | WEIGHT: 164.4 LBS | HEART RATE: 94 BPM | DIASTOLIC BLOOD PRESSURE: 58 MMHG | BODY MASS INDEX: 25.8 KG/M2

## 2020-08-04 DIAGNOSIS — N40.1 BENIGN PROSTATIC HYPERPLASIA WITH URINARY FREQUENCY: ICD-10-CM

## 2020-08-04 DIAGNOSIS — E11.3313 TYPE 2 DIABETES MELLITUS WITH BOTH EYES AFFECTED BY MODERATE NONPROLIFERATIVE RETINOPATHY AND MACULAR EDEMA, WITH LONG-TERM CURRENT USE OF INSULIN (HCC): ICD-10-CM

## 2020-08-04 DIAGNOSIS — E78.5 HYPERLIPIDEMIA, UNSPECIFIED HYPERLIPIDEMIA TYPE: ICD-10-CM

## 2020-08-04 DIAGNOSIS — K21.9 GASTROESOPHAGEAL REFLUX DISEASE, ESOPHAGITIS PRESENCE NOT SPECIFIED: ICD-10-CM

## 2020-08-04 DIAGNOSIS — R35.0 BENIGN PROSTATIC HYPERPLASIA WITH URINARY FREQUENCY: ICD-10-CM

## 2020-08-04 DIAGNOSIS — E11.9 TYPE 2 DIABETES MELLITUS WITHOUT COMPLICATION, WITHOUT LONG-TERM CURRENT USE OF INSULIN (HCC): ICD-10-CM

## 2020-08-04 DIAGNOSIS — E03.9 HYPOTHYROIDISM, UNSPECIFIED TYPE: ICD-10-CM

## 2020-08-04 DIAGNOSIS — I25.10 CORONARY ARTERY DISEASE INVOLVING NATIVE CORONARY ARTERY OF NATIVE HEART WITHOUT ANGINA PECTORIS: Primary | ICD-10-CM

## 2020-08-04 DIAGNOSIS — N62 GYNECOMASTIA, MALE: ICD-10-CM

## 2020-08-04 DIAGNOSIS — E11.3313 TYPE 2 DIABETES MELLITUS WITH BOTH EYES AFFECTED BY MODERATE NONPROLIFERATIVE RETINOPATHY AND MACULAR EDEMA, WITHOUT LONG-TERM CURRENT USE OF INSULIN (HCC): ICD-10-CM

## 2020-08-04 DIAGNOSIS — H61.91 SKIN LESION OF RIGHT EAR: ICD-10-CM

## 2020-08-04 DIAGNOSIS — Z79.4 TYPE 2 DIABETES MELLITUS WITH BOTH EYES AFFECTED BY MODERATE NONPROLIFERATIVE RETINOPATHY AND MACULAR EDEMA, WITH LONG-TERM CURRENT USE OF INSULIN (HCC): ICD-10-CM

## 2020-08-04 PROBLEM — E16.2 HYPOGLYCEMIA: Status: RESOLVED | Noted: 2020-04-27 | Resolved: 2020-08-04

## 2020-08-04 PROBLEM — D64.9 ANEMIA: Status: RESOLVED | Noted: 2019-05-28 | Resolved: 2020-08-04

## 2020-08-04 PROCEDURE — 1160F RVW MEDS BY RX/DR IN RCRD: CPT | Performed by: FAMILY MEDICINE

## 2020-08-04 PROCEDURE — 2026F EYE IMG VALID EVC RTNOPTHY: CPT | Performed by: FAMILY MEDICINE

## 2020-08-04 PROCEDURE — 4040F PNEUMOC VAC/ADMIN/RCVD: CPT | Performed by: FAMILY MEDICINE

## 2020-08-04 PROCEDURE — 3008F BODY MASS INDEX DOCD: CPT | Performed by: FAMILY MEDICINE

## 2020-08-04 PROCEDURE — 99214 OFFICE O/P EST MOD 30 MIN: CPT | Performed by: FAMILY MEDICINE

## 2020-08-04 PROCEDURE — 3052F HG A1C>EQUAL 8.0%<EQUAL 9.0%: CPT | Performed by: FAMILY MEDICINE

## 2020-08-04 PROCEDURE — 1036F TOBACCO NON-USER: CPT | Performed by: FAMILY MEDICINE

## 2020-08-04 RX ORDER — HUMAN INSULIN 100 [IU]/ML
INJECTION, SUSPENSION SUBCUTANEOUS
Qty: 15 PEN | Refills: 5 | Status: SHIPPED | OUTPATIENT
Start: 2020-08-04 | End: 2021-01-01

## 2020-08-04 NOTE — PROGRESS NOTES
Assessment/Plan:    Return visit in 3 months with fasting blood work prior to visit     Problem List Items Addressed This Visit        Digestive    Acid reflux disease     Continue Pepcid 40 mg daily            Endocrine    Hypothyroidism     Continue levothyroxine 125 mcg daily         Relevant Orders    TSH, 3rd generation with Free T4 reflex    T3, uptake    Type 2 diabetes mellitus with both eyes affected by moderate nonproliferative retinopathy and macular edema, with long-term current use of insulin (HCC)       Lab Results   Component Value Date    HGBA1C 8 8 07/21/2020   We are increasing his insulin 40 units the morning and 15 at night he does have labile blood sugars  He will continue metformin 500 mg b i d  And Starlix 120 mg t i d   Also Actos 45 mg daily         Relevant Medications    insulin isophane-insulin regular (NovoLIN 70/30 FlexPen) 100 units/mL injection pen    Other Relevant Orders    Hemoglobin A1C    Microalbumin / creatinine urine ratio       Cardiovascular and Mediastinum    CAD (coronary artery disease) - Primary     Continue Toprol-XL 25 mg daily            Genitourinary    Benign prostatic hyperplasia     Continue Flomax 0 4 mg daily            Other    Hyperlipidemia     Continue Lipitor 20 mg daily         Relevant Orders    Lipid panel    Comprehensive metabolic panel      Other Visit Diagnoses     Gynecomastia, male        Relevant Orders    Mammo diagnostic right w cad    US breast right limited (diagnostic)    Skin lesion of right ear        Relevant Orders    Ambulatory referral to Dermatology    Type 2 diabetes mellitus with both eyes affected by moderate nonproliferative retinopathy and macular edema, without long-term current use of insulin (HCC)        Relevant Medications    insulin isophane-insulin regular (NovoLIN 70/30 FlexPen) 100 units/mL injection pen    Type 2 diabetes mellitus without complication, without long-term current use of insulin (HCC)        Relevant Medications    insulin isophane-insulin regular (NovoLIN 70/30 FlexPen) 100 units/mL injection pen            Subjective:      Patient ID: Sabrina Blandon is a 80 y o  male  Patient comes in for a checkup  He complains of enlargement of his right breast   He complains of bleeding from his right ear  The following portions of the patient's history were reviewed and updated as appropriate:   He has a past medical history of BRBPR (bright red blood per rectum), Chest tightness or pressure, Cholecystitis, Diabetes mellitus ([address] ), Diverticulosis, Hearing loss, Hypothyroidism, Lyme disease, Shortness of breath, and Vertigo  ,  does not have any pertinent problems on file  ,   has a past surgical history that includes Cataract extraction; Laparoscopic cholecystectomy; Hernia repair; and Tonsillectomy  ,  family history includes Bipolar disorder in his family; No Known Problems in his mother; Other in his family; Prostate cancer in his family; Tongue cancer in his family  ,   reports that he has never smoked  He has never used smokeless tobacco  He reports that he does not drink alcohol or use drugs  ,  has No Known Allergies     Current Outpatient Medications   Medication Sig Dispense Refill    aspirin (ASPIRIN LOW DOSE) 81 MG tablet Take 1 tablet by mouth daily      atorvastatin (LIPITOR) 20 mg tablet Take 1 tablet (20 mg total) by mouth daily 90 tablet 3    bisacodyl (DULCOLAX) 10 mg suppository Insert 10 mg into the rectum Every 4th Morning PRN      cyanocobalamin (VITAMIN B-12) 1,000 mcg tablet Take by mouth      famotidine (PEPCID) 40 MG tablet Take 1 tablet (40 mg total) by mouth daily 30 tablet 5    glucose blood (OneTouch Ultra) test strip Check blood sugar b i d  50 each 5    insulin isophane-insulin regular (NovoLIN 70/30 FlexPen) 100 units/mL injection pen Inject 40 units in the morning and 15 units in the evening 15 pen 5    Insulin Pen Needle (BD AUTOSHIELD DUO) 30G X 5 MM MISC by Does not apply route 2 (two) times a day 100 each 5    levothyroxine 125 mcg tablet Take 1 tablet by mouth once daily 90 tablet 5    Magnesium Hydroxide (MILK OF MAGNESIA PO) Take by mouth daily as needed      meclizine (ANTIVERT) 12 5 MG tablet Take 1 tablet (12 5 mg total) by mouth every 8 (eight) hours as needed for dizziness 30 tablet 5    metFORMIN (GLUCOPHAGE-XR) 500 mg 24 hr tablet TAKE 1 TABLET BY MOUTH TWICE DAILY WITH MEALS 180 tablet 0    metoprolol succinate (TOPROL-XL) 25 mg 24 hr tablet Take 1 tablet (25 mg total) by mouth daily 90 tablet 3    nateglinide (STARLIX) 120 mg tablet Take 1 tablet (120 mg total) by mouth 3 (three) times a day before meals 270 tablet 3    omeprazole (PriLOSEC) 20 mg delayed release capsule Take 20 mg by mouth daily      pioglitazone (ACTOS) 45 mg tablet Take 1 tablet by mouth daily      polyethylene glycol (MIRALAX) 17 g packet       potassium-sodium phosphateS (PHOSPHA 250 NEUTRAL) 155-852-130 mg tablet 1 tablet every morning        acetaminophen (TYLENOL) 325 mg tablet Take 650 mg by mouth every 6 (six) hours as needed for mild pain      nitroglycerin (NITROSTAT) 0 4 mg SL tablet Place 1 tablet under the tongue every 5 (five) minutes as needed      tamsulosin (FLOMAX) 0 4 mg Take 1 capsule (0 4 mg total) by mouth daily with dinner 90 capsule 5     No current facility-administered medications for this visit  Review of Systems   Constitutional: Negative  Respiratory: Negative  Cardiovascular: Negative  Musculoskeletal: Positive for gait problem  Objective:  Vitals:    08/04/20 1019   BP: 114/58   Pulse: 94   Temp: (!) 97 3 °F (36 3 °C)   SpO2: 98%   Weight: 74 6 kg (164 lb 6 4 oz)   Height: 5' 7"     Body mass index is 25 75 kg/m²  Physical Exam   Constitutional: He is oriented to person, place, and time  He appears well-developed  HENT:   Head: Normocephalic and atraumatic     Right Ear: Tympanic membrane normal    Left Ear: Tympanic membrane normal  Eyes: Pupils are equal, round, and reactive to light  Neck: Neck supple  Cardiovascular: Normal rate, regular rhythm and normal heart sounds  Pulmonary/Chest: Effort normal and breath sounds normal    Abdominal: Soft  Normal appearance and bowel sounds are normal    Musculoskeletal:      Comments: Right breast enlargement   Neurological: He is alert and oriented to person, place, and time  Skin: Skin is warm and dry     Psychiatric: Thought content normal

## 2020-08-04 NOTE — ASSESSMENT & PLAN NOTE
Lab Results   Component Value Date    HGBA1C 8 8 07/21/2020   We are increasing his insulin 40 units the morning and 15 at night he does have labile blood sugars  He will continue metformin 500 mg b i d  And Starlix 120 mg t i d   Also Actos 45 mg daily

## 2020-09-20 DIAGNOSIS — E11.9 TYPE 2 DIABETES MELLITUS WITHOUT COMPLICATION, WITHOUT LONG-TERM CURRENT USE OF INSULIN (HCC): ICD-10-CM

## 2020-09-21 RX ORDER — METFORMIN HYDROCHLORIDE 500 MG/1
TABLET, EXTENDED RELEASE ORAL
Qty: 180 TABLET | Refills: 0 | Status: SHIPPED | OUTPATIENT
Start: 2020-09-21 | End: 2021-01-01

## 2020-10-05 ENCOUNTER — HOSPITAL ENCOUNTER (OUTPATIENT)
Dept: MAMMOGRAPHY | Facility: CLINIC | Age: 85
Discharge: HOME | End: 2020-10-05
Payer: MEDICARE

## 2020-10-05 ENCOUNTER — HOSPITAL ENCOUNTER (OUTPATIENT)
Dept: ULTRASOUND IMAGING | Facility: CLINIC | Age: 85
Discharge: HOME | End: 2020-10-05
Payer: MEDICARE

## 2020-10-05 VITALS — WEIGHT: 164 LBS | BODY MASS INDEX: 25.74 KG/M2 | HEIGHT: 67 IN

## 2020-10-05 DIAGNOSIS — N62 GYNECOMASTIA: ICD-10-CM

## 2020-10-05 DIAGNOSIS — N62 GYNECOMASTIA, MALE: ICD-10-CM

## 2020-10-05 PROCEDURE — G0279 TOMOSYNTHESIS, MAMMO: HCPCS

## 2020-10-05 PROCEDURE — 76642 ULTRASOUND BREAST LIMITED: CPT

## 2020-10-05 PROCEDURE — 77066 DX MAMMO INCL CAD BI: CPT

## 2020-10-22 DIAGNOSIS — E11.9 TYPE 2 DIABETES MELLITUS WITHOUT COMPLICATION, WITHOUT LONG-TERM CURRENT USE OF INSULIN (HCC): ICD-10-CM

## 2020-10-22 RX ORDER — NATEGLINIDE 120 MG/1
TABLET ORAL
Qty: 270 TABLET | Refills: 0 | Status: SHIPPED | OUTPATIENT
Start: 2020-10-22 | End: 2021-01-01

## 2020-11-04 LAB
LEFT EYE DIABETIC RETINOPATHY: NORMAL
RIGHT EYE DIABETIC RETINOPATHY: NORMAL

## 2020-11-24 DIAGNOSIS — I25.10 CORONARY ARTERY DISEASE INVOLVING NATIVE CORONARY ARTERY OF NATIVE HEART WITHOUT ANGINA PECTORIS: ICD-10-CM

## 2020-11-24 RX ORDER — METOPROLOL SUCCINATE 25 MG/1
25 TABLET, EXTENDED RELEASE ORAL DAILY
Qty: 90 TABLET | Refills: 3 | Status: SHIPPED | OUTPATIENT
Start: 2020-11-24

## 2020-12-09 DIAGNOSIS — K21.9 GASTROESOPHAGEAL REFLUX DISEASE WITHOUT ESOPHAGITIS: ICD-10-CM

## 2020-12-09 RX ORDER — FAMOTIDINE 40 MG/1
TABLET, FILM COATED ORAL
Qty: 24 TABLET | Refills: 0 | Status: SHIPPED | OUTPATIENT
Start: 2020-12-09 | End: 2021-01-04

## 2021-01-01 ENCOUNTER — OFFICE VISIT (OUTPATIENT)
Dept: CARDIOLOGY CLINIC | Facility: CLINIC | Age: 86
End: 2021-01-01
Payer: MEDICARE

## 2021-01-01 ENCOUNTER — TELEPHONE (OUTPATIENT)
Dept: FAMILY MEDICINE CLINIC | Facility: CLINIC | Age: 86
End: 2021-01-01

## 2021-01-01 ENCOUNTER — OFFICE VISIT (OUTPATIENT)
Dept: FAMILY MEDICINE CLINIC | Facility: CLINIC | Age: 86
End: 2021-01-01
Payer: MEDICARE

## 2021-01-01 VITALS
HEIGHT: 67 IN | SYSTOLIC BLOOD PRESSURE: 126 MMHG | BODY MASS INDEX: 26.37 KG/M2 | TEMPERATURE: 97.8 F | OXYGEN SATURATION: 94 % | HEART RATE: 96 BPM | DIASTOLIC BLOOD PRESSURE: 66 MMHG | WEIGHT: 168 LBS

## 2021-01-01 VITALS
SYSTOLIC BLOOD PRESSURE: 138 MMHG | WEIGHT: 167 LBS | OXYGEN SATURATION: 97 % | HEART RATE: 79 BPM | BODY MASS INDEX: 26.21 KG/M2 | DIASTOLIC BLOOD PRESSURE: 60 MMHG | HEIGHT: 67 IN

## 2021-01-01 VITALS
SYSTOLIC BLOOD PRESSURE: 136 MMHG | BODY MASS INDEX: 26.49 KG/M2 | OXYGEN SATURATION: 96 % | DIASTOLIC BLOOD PRESSURE: 60 MMHG | HEART RATE: 70 BPM | HEIGHT: 67 IN | TEMPERATURE: 97.2 F | WEIGHT: 168.8 LBS

## 2021-01-01 DIAGNOSIS — E11.9 TYPE 2 DIABETES MELLITUS WITHOUT COMPLICATION, WITHOUT LONG-TERM CURRENT USE OF INSULIN (HCC): ICD-10-CM

## 2021-01-01 DIAGNOSIS — I25.10 CORONARY ARTERY DISEASE INVOLVING NATIVE CORONARY ARTERY OF NATIVE HEART WITHOUT ANGINA PECTORIS: Primary | ICD-10-CM

## 2021-01-01 DIAGNOSIS — Z23 ENCOUNTER FOR IMMUNIZATION: ICD-10-CM

## 2021-01-01 DIAGNOSIS — N40.1 BENIGN PROSTATIC HYPERPLASIA WITH URINARY FREQUENCY: ICD-10-CM

## 2021-01-01 DIAGNOSIS — E78.5 HYPERLIPIDEMIA, UNSPECIFIED HYPERLIPIDEMIA TYPE: ICD-10-CM

## 2021-01-01 DIAGNOSIS — E78.5 HLD (HYPERLIPIDEMIA): ICD-10-CM

## 2021-01-01 DIAGNOSIS — E03.9 HYPOTHYROIDISM, UNSPECIFIED TYPE: ICD-10-CM

## 2021-01-01 DIAGNOSIS — K21.9 GASTROESOPHAGEAL REFLUX DISEASE WITHOUT ESOPHAGITIS: ICD-10-CM

## 2021-01-01 DIAGNOSIS — N40.0 BENIGN PROSTATIC HYPERPLASIA: ICD-10-CM

## 2021-01-01 DIAGNOSIS — Z79.4 TYPE 2 DIABETES MELLITUS WITH BOTH EYES AFFECTED BY MODERATE NONPROLIFERATIVE RETINOPATHY AND MACULAR EDEMA, WITH LONG-TERM CURRENT USE OF INSULIN (HCC): Primary | ICD-10-CM

## 2021-01-01 DIAGNOSIS — E11.3313 TYPE 2 DIABETES MELLITUS WITH BOTH EYES AFFECTED BY MODERATE NONPROLIFERATIVE RETINOPATHY AND MACULAR EDEMA, WITH LONG-TERM CURRENT USE OF INSULIN (HCC): ICD-10-CM

## 2021-01-01 DIAGNOSIS — R41.3 MEMORY DIFFICULTIES: ICD-10-CM

## 2021-01-01 DIAGNOSIS — Z79.4 TYPE 2 DIABETES MELLITUS WITH BOTH EYES AFFECTED BY MODERATE NONPROLIFERATIVE RETINOPATHY AND MACULAR EDEMA, WITH LONG-TERM CURRENT USE OF INSULIN (HCC): ICD-10-CM

## 2021-01-01 DIAGNOSIS — Z00.00 MEDICARE ANNUAL WELLNESS VISIT, SUBSEQUENT: Primary | ICD-10-CM

## 2021-01-01 DIAGNOSIS — I25.10 CORONARY ARTERY DISEASE INVOLVING NATIVE CORONARY ARTERY OF NATIVE HEART WITHOUT ANGINA PECTORIS: ICD-10-CM

## 2021-01-01 DIAGNOSIS — K21.9 GASTROESOPHAGEAL REFLUX DISEASE, UNSPECIFIED WHETHER ESOPHAGITIS PRESENT: ICD-10-CM

## 2021-01-01 DIAGNOSIS — E11.3313 TYPE 2 DIABETES MELLITUS WITH BOTH EYES AFFECTED BY MODERATE NONPROLIFERATIVE RETINOPATHY AND MACULAR EDEMA, WITH LONG-TERM CURRENT USE OF INSULIN (HCC): Primary | ICD-10-CM

## 2021-01-01 DIAGNOSIS — E11.3313 TYPE 2 DIABETES MELLITUS WITH BOTH EYES AFFECTED BY MODERATE NONPROLIFERATIVE RETINOPATHY AND MACULAR EDEMA, WITHOUT LONG-TERM CURRENT USE OF INSULIN (HCC): ICD-10-CM

## 2021-01-01 DIAGNOSIS — E78.2 MIXED HYPERLIPIDEMIA: ICD-10-CM

## 2021-01-01 DIAGNOSIS — R35.0 BENIGN PROSTATIC HYPERPLASIA WITH URINARY FREQUENCY: ICD-10-CM

## 2021-01-01 LAB
LEFT EYE DIABETIC RETINOPATHY: NORMAL
LEFT EYE DIABETIC RETINOPATHY: NORMAL
RIGHT EYE DIABETIC RETINOPATHY: NORMAL
RIGHT EYE DIABETIC RETINOPATHY: NORMAL
SL AMB POCT HEMOGLOBIN AIC: 7.9 (ref ?–6.5)

## 2021-01-01 PROCEDURE — 90662 IIV NO PRSV INCREASED AG IM: CPT

## 2021-01-01 PROCEDURE — 99214 OFFICE O/P EST MOD 30 MIN: CPT | Performed by: FAMILY MEDICINE

## 2021-01-01 PROCEDURE — 99215 OFFICE O/P EST HI 40 MIN: CPT | Performed by: FAMILY MEDICINE

## 2021-01-01 PROCEDURE — G0439 PPPS, SUBSEQ VISIT: HCPCS | Performed by: FAMILY MEDICINE

## 2021-01-01 PROCEDURE — 1123F ACP DISCUSS/DSCN MKR DOCD: CPT | Performed by: FAMILY MEDICINE

## 2021-01-01 PROCEDURE — 99214 OFFICE O/P EST MOD 30 MIN: CPT | Performed by: INTERNAL MEDICINE

## 2021-01-01 PROCEDURE — G0008 ADMIN INFLUENZA VIRUS VAC: HCPCS

## 2021-01-01 PROCEDURE — 83036 HEMOGLOBIN GLYCOSYLATED A1C: CPT | Performed by: FAMILY MEDICINE

## 2021-01-01 RX ORDER — BLOOD SUGAR DIAGNOSTIC
STRIP MISCELLANEOUS
Qty: 200 EACH | Refills: 5 | Status: SHIPPED | OUTPATIENT
Start: 2021-01-01 | End: 2021-01-01 | Stop reason: SDUPTHER

## 2021-01-01 RX ORDER — TAMSULOSIN HYDROCHLORIDE 0.4 MG/1
CAPSULE ORAL
Qty: 90 CAPSULE | Refills: 0 | Status: SHIPPED | OUTPATIENT
Start: 2021-01-01 | End: 2021-01-01

## 2021-01-01 RX ORDER — METFORMIN HYDROCHLORIDE 500 MG/1
TABLET, EXTENDED RELEASE ORAL
Qty: 180 TABLET | Refills: 0 | Status: SHIPPED | OUTPATIENT
Start: 2021-01-01

## 2021-01-01 RX ORDER — LEVOTHYROXINE SODIUM 125 UG/1
TABLET ORAL
Qty: 90 TABLET | Refills: 0 | Status: SHIPPED | OUTPATIENT
Start: 2021-01-01 | End: 2021-01-01 | Stop reason: SDUPTHER

## 2021-01-01 RX ORDER — ATORVASTATIN CALCIUM 20 MG/1
TABLET, FILM COATED ORAL
Qty: 90 TABLET | Refills: 0 | Status: SHIPPED | OUTPATIENT
Start: 2021-01-01 | End: 2021-01-01 | Stop reason: SDUPTHER

## 2021-01-01 RX ORDER — LEVOTHYROXINE SODIUM 125 UG/1
TABLET ORAL
Qty: 90 TABLET | Refills: 0 | Status: SHIPPED | OUTPATIENT
Start: 2021-01-01

## 2021-01-01 RX ORDER — FAMOTIDINE 40 MG/1
40 TABLET, FILM COATED ORAL DAILY
Qty: 90 TABLET | Refills: 3 | Status: CANCELLED | OUTPATIENT
Start: 2021-01-01

## 2021-01-01 RX ORDER — FAMOTIDINE 40 MG/1
TABLET, FILM COATED ORAL
Qty: 24 TABLET | Refills: 0 | Status: SHIPPED | OUTPATIENT
Start: 2021-01-01 | End: 2021-01-01

## 2021-01-01 RX ORDER — BLOOD SUGAR DIAGNOSTIC
STRIP MISCELLANEOUS
Qty: 200 EACH | Refills: 3 | Status: CANCELLED | OUTPATIENT
Start: 2021-01-01

## 2021-01-01 RX ORDER — HUMAN INSULIN 100 [IU]/ML
INJECTION, SUSPENSION SUBCUTANEOUS
Qty: 15 ML | Refills: 0 | Status: SHIPPED | OUTPATIENT
Start: 2021-01-01

## 2021-01-01 RX ORDER — TAMSULOSIN HYDROCHLORIDE 0.4 MG/1
0.4 CAPSULE ORAL
Qty: 90 CAPSULE | Refills: 5 | Status: SHIPPED | OUTPATIENT
Start: 2021-01-01

## 2021-01-01 RX ORDER — DIPHENHYDRAMINE HYDROCHLORIDE 25 MG/1
CAPSULE, LIQUID FILLED ORAL 3 TIMES DAILY
COMMUNITY
End: 2021-01-01 | Stop reason: SDUPTHER

## 2021-01-01 RX ORDER — FAMOTIDINE 40 MG/1
TABLET, FILM COATED ORAL
Qty: 24 TABLET | Refills: 0 | Status: SHIPPED | OUTPATIENT
Start: 2021-01-01 | End: 2021-01-01 | Stop reason: SDUPTHER

## 2021-01-01 RX ORDER — ATORVASTATIN CALCIUM 20 MG/1
TABLET, FILM COATED ORAL
Qty: 90 TABLET | Refills: 0 | Status: SHIPPED | OUTPATIENT
Start: 2021-01-01 | End: 2021-01-01

## 2021-01-01 RX ORDER — DIPHENHYDRAMINE HYDROCHLORIDE 25 MG/1
CAPSULE, LIQUID FILLED ORAL 3 TIMES DAILY
Qty: 1 KIT | Refills: 0 | Status: SHIPPED | OUTPATIENT
Start: 2021-01-01

## 2021-01-01 RX ORDER — LEVOTHYROXINE SODIUM 0.12 MG/1
125 TABLET ORAL DAILY
Qty: 90 TABLET | Refills: 3 | Status: SHIPPED | OUTPATIENT
Start: 2021-01-01 | End: 2021-01-01

## 2021-01-01 RX ORDER — METFORMIN HYDROCHLORIDE 500 MG/1
TABLET, EXTENDED RELEASE ORAL
Qty: 180 TABLET | Refills: 0 | Status: SHIPPED | OUTPATIENT
Start: 2021-01-01 | End: 2021-01-01

## 2021-01-01 RX ORDER — BLOOD SUGAR DIAGNOSTIC
STRIP MISCELLANEOUS
Qty: 50 EACH | Refills: 0 | Status: SHIPPED | OUTPATIENT
Start: 2021-01-01 | End: 2021-01-01 | Stop reason: SDUPTHER

## 2021-01-01 RX ORDER — NATEGLINIDE 120 MG/1
TABLET ORAL
Qty: 270 TABLET | Refills: 0 | Status: SHIPPED | OUTPATIENT
Start: 2021-01-01

## 2021-01-01 RX ORDER — ATORVASTATIN CALCIUM 20 MG/1
20 TABLET, FILM COATED ORAL DAILY
Qty: 90 TABLET | Refills: 3 | Status: SHIPPED | OUTPATIENT
Start: 2021-01-01

## 2021-01-01 RX ORDER — BLOOD SUGAR DIAGNOSTIC
STRIP MISCELLANEOUS
Qty: 200 EACH | Refills: 5 | Status: SHIPPED | OUTPATIENT
Start: 2021-01-01

## 2021-01-01 RX ORDER — FAMOTIDINE 40 MG/1
40 TABLET, FILM COATED ORAL DAILY
Qty: 90 TABLET | Refills: 5 | Status: SHIPPED | OUTPATIENT
Start: 2021-01-01

## 2021-01-01 RX ORDER — BLOOD SUGAR DIAGNOSTIC
STRIP MISCELLANEOUS
Qty: 200 EACH | Refills: 5 | Status: SHIPPED | OUTPATIENT
Start: 2021-01-01 | End: 2021-01-01

## 2021-01-01 RX ORDER — TAMSULOSIN HYDROCHLORIDE 0.4 MG/1
CAPSULE ORAL
Qty: 90 CAPSULE | Refills: 0 | Status: SHIPPED | OUTPATIENT
Start: 2021-01-01 | End: 2021-01-01 | Stop reason: SDUPTHER

## 2021-01-04 DIAGNOSIS — K21.9 GASTROESOPHAGEAL REFLUX DISEASE WITHOUT ESOPHAGITIS: ICD-10-CM

## 2021-01-04 RX ORDER — FAMOTIDINE 40 MG/1
TABLET, FILM COATED ORAL
Qty: 24 TABLET | Refills: 0 | Status: SHIPPED | OUTPATIENT
Start: 2021-01-04 | End: 2021-02-10

## 2021-01-06 DIAGNOSIS — E11.9 TYPE 2 DIABETES MELLITUS WITHOUT COMPLICATION, WITHOUT LONG-TERM CURRENT USE OF INSULIN (HCC): ICD-10-CM

## 2021-01-06 RX ORDER — BLOOD SUGAR DIAGNOSTIC
STRIP MISCELLANEOUS
Qty: 50 EACH | Refills: 5 | Status: SHIPPED | OUTPATIENT
Start: 2021-01-06 | End: 2021-01-01

## 2021-01-07 LAB
LEFT EYE DIABETIC RETINOPATHY: NORMAL
RIGHT EYE DIABETIC RETINOPATHY: NORMAL

## 2021-02-10 DIAGNOSIS — K21.9 GASTROESOPHAGEAL REFLUX DISEASE WITHOUT ESOPHAGITIS: ICD-10-CM

## 2021-02-10 RX ORDER — FAMOTIDINE 40 MG/1
TABLET, FILM COATED ORAL
Qty: 24 TABLET | Refills: 0 | Status: SHIPPED | OUTPATIENT
Start: 2021-02-10 | End: 2021-01-01

## 2021-07-02 NOTE — TELEPHONE ENCOUNTER
Dr Liv Montana - please review request for refill of Novolin 70/30 Flex pen  The request failed [facility] refill protocols  Thank you

## 2021-07-20 PROBLEM — R41.3 MEMORY DIFFICULTIES: Status: ACTIVE | Noted: 2021-01-01

## 2021-07-20 NOTE — ASSESSMENT & PLAN NOTE
Continue Novolin 70/30 40 units in a m  and 15 units in p m   I advised him to take his evening dose prior to his evening meal rather than at bedtime due to low blood sugars in the morning  I do not believe he is taking Starlix and have  Taking this off his med list   He  May still be taking metformin 500 mg b i d    Lab Results   Component Value Date    HGBA1C 7 9 (A) 07/20/2021

## 2021-07-20 NOTE — PROGRESS NOTES
Assessment and Plan:     Problem List Items Addressed This Visit     None      Visit Diagnoses     Medicare annual wellness visit, subsequent    -  Primary    Type 2 diabetes mellitus without complication, without long-term current use of insulin ([address] )        Relevant Orders    POCT hemoglobin A1c           Preventive health issues were discussed with patient, and age appropriate screening tests were ordered as noted in patient's After Visit Summary  Personalized health advice and appropriate referrals for health education or preventive services given if needed, as noted in patient's After Visit Summary       History of Present Illness:     Patient presents for Medicare Annual Wellness visit    Patient Care Team:  Ranjit Cobos MD as PCP - General (Family Medicine)  Michael Dsouza MD     Problem List:     Patient Active Problem List   Diagnosis    CAD (coronary artery disease)    Hyperlipidemia    Hypothyroidism    Chronic cough    Acid reflux disease    Benign prostatic hyperplasia    DDD (degenerative disc disease), lumbar    Diverticulosis    Vertigo    Type 2 diabetes mellitus with both eyes affected by moderate nonproliferative retinopathy and macular edema, with long-term current use of insulin ([address] )    Cervical radiculopathy      Past Medical and Surgical History:     Past Medical History:   Diagnosis Date    BRBPR (bright red blood per rectum)     Last Assessed: 1/11/2016    Chest tightness or pressure     Last Assessed: 3/27/2014    Cholecystitis     Last Assessed: 9/1/2015    Diabetes mellitus ([address] )     Diverticulosis     Hearing loss     Hypothyroidism     Lyme disease     Macular degeneration     Shortness of breath     Last Assessed: 3/27/2014    Vertigo     Last Assessed: 12/31/2015     Past Surgical History:   Procedure Laterality Date    CATARACT EXTRACTION      Last Assessed: 11/11/2016    HERNIA REPAIR      LAPAROSCOPIC CHOLECYSTECTOMY      TONSILLECTOMY        Family History:     Family History   Problem Relation Age of Onset    No Known Problems Mother     Bipolar disorder Family         II    Other Family         cardiac disorder    Prostate cancer Father     Cancer Sister     Tongue cancer Sister     Cancer Brother       Social History:     Social History     Socioeconomic History    Marital status:      Spouse name: None    Number of children: None    Years of education: None    Highest education level: None   Occupational History    None   Tobacco Use    Smoking status: Never Smoker    Smokeless tobacco: Never Used   Vaping Use    Vaping Use: Never used   Substance and Sexual Activity    Alcohol use: No    Drug use: No    Sexual activity: None   Other Topics Concern    None   Social History Narrative    None     Social Determinants of Health     Financial Resource Strain:     Difficulty of Paying Living Expenses:    Food Insecurity:     Worried About Running Out of Food in the Last Year:     Ran Out of Food in the Last Year:    Transportation Needs:     Lack of Transportation (Medical):      Lack of Transportation (Non-Medical):    Physical Activity:     Days of Exercise per Week:     Minutes of Exercise per Session:    Stress:     Feeling of Stress :    Social Connections:     Frequency of Communication with Friends and Family:     Frequency of Social Gatherings with Friends and Family:     Attends [employer] Services:     Active Member of Clubs or Organizations:     Attends Club or Organization Meetings:     Marital Status:    Intimate Partner Violence:     Fear of Current or Ex-Partner:     Emotionally Abused:     Physically Abused:     Sexually Abused:       Medications and Allergies:     Current Outpatient Medications   Medication Sig Dispense Refill    aspirin (ASPIRIN LOW DOSE) 81 MG tablet Take 1 tablet by mouth daily      atorvastatin (LIPITOR) 20 mg tablet Take 1 tablet by mouth once daily 90 tablet 0    cyanocobalamin (VITAMIN B-12) 1,000 mcg tablet Take by mouth      Euthyrox 125 MCG tablet Take 1 tablet by mouth once daily (Patient taking differently: Take 125 mcg by mouth daily ) 90 tablet 0    famotidine (PEPCID) 40 MG tablet Take 1 tablet by mouth once daily 24 tablet 0    glucose blood (OneTouch Ultra) test strip USE  STRIP TO CHECK GLUCOSE TWICE DAILY 50 each 0    insulin isophane-insulin regular (NovoLIN 70/30 FlexPen) 100 units/mL injection pen INJECT 40 UNITS SUBCUTANEOUSLY IN THE MORNING AND 15 IN THE EVENING 15 mL 0    Insulin Pen Needle (BD AUTOSHIELD DUO) 30G X 5 MM MISC by Does not apply route 2 (two) times a day 100 each 5    Magnesium Hydroxide (MILK OF MAGNESIA PO) Take by mouth daily as needed      meclizine (ANTIVERT) 12 5 MG tablet Take 1 tablet (12 5 mg total) by mouth every 8 (eight) hours as needed for dizziness 30 tablet 5    metFORMIN (GLUCOPHAGE-XR) 500 mg 24 hr tablet TAKE 1 TABLET BY MOUTH TWICE DAILY WITH MEALS 180 tablet 0    metoprolol succinate (TOPROL-XL) 25 mg 24 hr tablet Take 1 tablet (25 mg total) by mouth daily 90 tablet 3    nateglinide (STARLIX) 120 mg tablet TAKE 1 TABLET BY MOUTH THREE TIMES DAILY BEFORE MEAL(S) 270 tablet 0    nitroglycerin (NITROSTAT) 0 4 mg SL tablet Place 1 tablet under the tongue every 5 (five) minutes as needed      acetaminophen (TYLENOL) 325 mg tablet Take 650 mg by mouth every 6 (six) hours as needed for mild pain (Patient not taking: Reported on 7/20/2021)      bisacodyl (DULCOLAX) 10 mg suppository Insert 10 mg into the rectum Every 4th Morning PRN      omeprazole (PriLOSEC) 20 mg delayed release capsule Take 20 mg by mouth daily (Patient not taking: Reported on 7/20/2021)      pioglitazone (ACTOS) 45 mg tablet Take 1 tablet by mouth daily      polyethylene glycol (MIRALAX) 17 g packet       potassium-sodium phosphateS (PHOSPHA 250 NEUTRAL) 155-852-130 mg tablet 1 tablet every morning        tamsulosin (FLOMAX) 0 4 mg TAKE 1 CAPSULE BY MOUTH ONCE DAILY WITH  DINNER 90 capsule 0     No current facility-administered medications for this visit  No Known Allergies   Immunizations:     Immunization History   Administered Date(s) Administered    INFLUENZA 10/14/2017, 08/28/2018    Influenza Split High Dose Preservative Free IM 09/13/2016, 08/28/2018    Influenza, high dose seasonal 0 7 mL 09/30/2019, 08/18/2020    Pneumococcal Conjugate 13-Valent 09/13/2016, 08/18/2020    Pneumococcal Polysaccharide PPV23 10/15/2009, 10/19/2017    SARS-CoV-2 / COVID-19 mRNA IM (Moderna) 03/24/2021, 04/21/2021    Zoster Vaccine Recombinant 08/18/2020, 12/23/2020      Health Maintenance: There are no preventive care reminders to display for this patient  Topic Date Due    DTaP,Tdap,and Td Vaccines (1 - Tdap) Never done    Influenza Vaccine (1) 09/01/2021      Medicare Health Risk Assessment:     /60 (BP Location: Left arm, Patient Position: Sitting, Cuff Size: Adult)   Pulse 70   Temp (!) 97 2 °F (36 2 °C) (Tympanic)   Ht 5' 7" (1 702 m)   Wt 76 6 kg (168 lb 12 8 oz)   SpO2 96%   BMI 26 44 kg/m²      Ana Carter is here for his Subsequent Wellness visit  Last Medicare Wellness visit information reviewed, patient interviewed and updates made to the record today  Health Risk Assessment:   Patient rates overall health as good  Patient feels that their physical health rating is same  Patient is satisfied with their life  Eyesight was rated as slightly worse  Hearing was rated as slightly worse  Patient feels that their emotional and mental health rating is same  Patients states they are never, rarely angry  Patient states they are often unusually tired/fatigued  Pain experienced in the last 7 days has been some  Patient's pain rating has been 2/10  Patient states that he has experienced no weight loss or gain in last 6 months  Depression Screening:   PHQ-2 Score: 0      Fall Risk Screening:    In the past year, patient has experienced: history of falling in past year    Number of falls: 2 or more  Injured during fall?: Yes    Feels unsteady when standing or walking?: Yes    Worried about falling?: No      Home Safety:  Patient does not have trouble with stairs inside or outside of their home  Patient has working smoke alarms and has working carbon monoxide detector  Home safety hazards include: unsecured electrical cords  Nutrition:   Current diet is Regular  Medications:   Patient is currently taking over-the-counter supplements  OTC medications include: see medication list  Patient is able to manage medications  Activities of Daily Living (ADLs)/Instrumental Activities of Daily Living (IADLs):   Walk and transfer into and out of bed and chair?: Yes  Dress and groom yourself?: Yes    Bathe or shower yourself?: Yes    Feed yourself? Yes  Do your laundry/housekeeping?: No  Manage your money, pay your bills and track your expenses?: Yes  Make your own meals?: No    Do your own shopping?: No    ADL comments: Per patient's neighbor he is just washing up and not taking a full shower due to not having grab bars  Per neighbor [facility]ging has been contacted  Gets Meals on Wheels and has been eating at restaurants  Per neighbor patient does not do his own laundry  Previous Hospitalizations:   Any hospitalizations or ED visits within the last 12 months?: No      Advance Care Planning:   Living will: Yes    Durable POA for healthcare:  Yes    Advanced directive: Yes    Advanced directive counseling given: Yes    Five wishes given: No    End of Life Decisions reviewed with patient: Yes    Provider agrees with end of life decisions: Yes      PREVENTIVE SCREENINGS      Cardiovascular Screening:    General: Screening Not Indicated and History Lipid Disorder      Diabetes Screening:     General: Screening Not Indicated and History Diabetes      Colorectal Cancer Screening:     General: Screening Not Indicated      Prostate Cancer Screening:    General: Screening Not Indicated      Osteoporosis Screening:    General: Risks and Benefits Discussed and Patient Declines      Abdominal Aortic Aneurysm (AAA) Screening:        General: Screening Not Indicated      Lung Cancer Screening:     General: Screening Not Indicated      Hepatitis C Screening:    General: Screening Current    Screening, Brief Intervention, and Referral to Treatment (SBIRT)    Screening  Typical number of drinks in a day: 0  Typical number of drinks in a week: 0  Interpretation: Low risk drinking behavior  Single Item Drug Screening:  How often have you used an illegal drug (including marijuana) or a prescription medication for non-medical reasons in the past year? never    Single Item Drug Screen Score: 0  Interpretation: Negative screen for possible drug use disorder    Other Counseling Topics:   Car/seat belt/driving safety         Rasheeda Alicia MD

## 2021-07-20 NOTE — PATIENT INSTRUCTIONS
Medicare Preventive Visit Patient Instructions  Thank you for completing your Welcome to Medicare Visit or Medicare Annual Wellness Visit today  Your next wellness visit will be due in one year (7/21/2022)  The screening/preventive services that you may require over the next 5-10 years are detailed below  Some tests may not apply to you based off risk factors and/or age  Screening tests ordered at today's visit but not completed yet may show as past due  Also, please note that scanned in results may not display below  Preventive Screenings:  Service Recommendations Previous Testing/Comments   Colorectal Cancer Screening  · Colonoscopy    · Fecal Occult Blood Test (FOBT)/Fecal Immunochemical Test (FIT)  · Fecal DNA/Cologuard Test  · Flexible Sigmoidoscopy Age: 54-65 years old   Colonoscopy: every 10 years (May be performed more frequently if at higher risk)  OR  FOBT/FIT: every 1 year  OR  Cologuard: every 3 years  OR  Sigmoidoscopy: every 5 years  Screening may be recommended earlier than age 48 if at higher risk for colorectal cancer  Also, an individualized decision between you and your healthcare provider will decide whether screening between the ages of 74-80 would be appropriate   Colonoscopy: Not on file  FOBT/FIT: Not on file  Cologuard: Not on file  Sigmoidoscopy: Not on file    Screening Not Indicated     Prostate Cancer Screening Individualized decision between patient and health care provider in men between ages of 53-78   Medicare will cover every 12 months beginning on the day after your 50th birthday PSA: No results in last 5 years     Screening Not Indicated     Hepatitis C Screening Once for adults born between [facility]  More frequently in patients at high risk for Hepatitis C Hep C Antibody: Not on file        Diabetes Screening 1-2 times per year if you're at risk for diabetes or have pre-diabetes Fasting glucose: No results in last 5 years   A1C: 8 8 %    Screening Not Indicated  History Diabetes   Cholesterol Screening Once every 5 years if you don't have a lipid disorder  May order more often based on risk factors  Lipid panel: 07/21/2020    Screening Not Indicated  History Lipid Disorder      Other Preventive Screenings Covered by Medicare:  1  Abdominal Aortic Aneurysm (AAA) Screening: covered once if your at risk  You're considered to be at risk if you have a family history of AAA or a male between the age of 73-68 who smoking at least 100 cigarettes in your lifetime  2  Lung Cancer Screening: covers low dose CT scan once per year if you meet all of the following conditions: (1) Age 50-69; (2) No signs or symptoms of lung cancer; (3) Current smoker or have quit smoking within the last 15 years; (4) You have a tobacco smoking history of at least 30 pack years (packs per day x number of years you smoked); (5) You get a written order from a healthcare provider  3  Glaucoma Screening: covered annually if you're considered high risk: (1) You have diabetes OR (2) Family history of glaucoma OR (3)  aged 48 and older OR (3)  American aged 72 and older  3  Osteoporosis Screening: covered every 2 years if you meet one of the following conditions: (1) Have a vertebral abnormality; (2) On glucocorticoid therapy for more than 3 months; (3) Have primary hyperparathyroidism; (4) On osteoporosis medications and need to assess response to drug therapy  5  HIV Screening: covered annually if you're between the age of 12-76  Also covered annually if you are younger than 13 and older than 72 with risk factors for HIV infection  For pregnant patients, it is covered up to 3 times per pregnancy      Immunizations:  Immunization Recommendations   Influenza Vaccine Annual influenza vaccination during flu season is recommended for all persons aged >= 6 months who do not have contraindications   Pneumococcal Vaccine (Prevnar and Pneumovax)  * Prevnar = PCV13  * Pneumovax = PPSV23 Adults 19-64 years old: 1-3 doses may be recommended based on certain risk factors  Adults 72 years old: Prevnar (PCV13) vaccine recommended followed by Pneumovax (PPSV23) vaccine  If already received PPSV23 since turning 65, then PCV13 recommended at least one year after PPSV23 dose  Hepatitis B Vaccine 3 dose series if at intermediate or high risk (ex: diabetes, end stage renal disease, liver disease)   Tetanus (Td) Vaccine - COST NOT COVERED BY MEDICARE PART B Following completion of primary series, a booster dose should be given every 10 years to maintain immunity against tetanus  Td may also be given as tetanus wound prophylaxis  Tdap Vaccine - COST NOT COVERED BY MEDICARE PART B Recommended at least once for all adults  For pregnant patients, recommended with each pregnancy  Shingles Vaccine (Shingrix) - COST NOT COVERED BY MEDICARE PART B  2 shot series recommended in those aged 48 and above     Health Maintenance Due:  There are no preventive care reminders to display for this patient  Immunizations Due:      Topic Date Due    DTaP,Tdap,and Td Vaccines (1 - Tdap) Never done    Influenza Vaccine (1) 09/01/2021     Advance Directives   What are advance directives? Advance directives are legal documents that state your wishes and plans for medical care  These plans are made ahead of time in case you lose your ability to make decisions for yourself  Advance directives can apply to any medical decision, such as the treatments you want, and if you want to donate organs  What are the types of advance directives? There are many types of advance directives, and each state has rules about how to use them  You may choose a combination of any of the following:  · Living will: This is a written record of the treatment you want  You can also choose which treatments you do not want, which to limit, and which to stop at a certain time  This includes surgery, medicine, IV fluid, and tube feedings     · Durable power of  for [facility]): This is a written record that states who you want to make healthcare choices for you when you are unable to make them for yourself  This person, called a proxy, is usually a family member or a friend  You may choose more than 1 proxy  · Do not resuscitate (DNR) order:  A DNR order is used in case your heart stops beating or you stop breathing  It is a request not to have certain forms of treatment, such as CPR  A DNR order may be included in other types of advance directives  · Medical directive: This covers the care that you want if you are in a coma, near death, or unable to make decisions for yourself  You can list the treatments you want for each condition  Treatment may include pain medicine, surgery, blood transfusions, dialysis, IV or tube feedings, and a ventilator (breathing machine)  · Values history: This document has questions about your views, beliefs, and how you feel and think about life  This information can help others choose the care that you would choose  Why are advance directives important? An advance directive helps you control your care  Although spoken wishes may be used, it is better to have your wishes written down  Spoken wishes can be misunderstood, or not followed  Treatments may be given even if you do not want them  An advance directive may make it easier for your family to make difficult choices about your care  Fall Prevention    Fall prevention  includes ways to make your home and other areas safer  It also includes ways you can move more carefully to prevent a fall  Health conditions that cause changes in your blood pressure, vision, or muscle strength and coordination may increase your risk for falls  Medicines may also increase your risk for falls if they make you dizzy, weak, or sleepy  Fall prevention tips:   · Stand or sit up slowly  · Use assistive devices as directed  · Wear shoes that fit well and have soles that   · Wear a personal alarm  · Stay active  · Manage your medical conditions  Home Safety Tips:  · Add items to prevent falls in the bathroom  · Keep paths clear  · Install bright lights in your home  · Keep items you use often on shelves within reach  · Paint or place reflective tape on the edges of your stairs  Weight Management   Why it is important to manage your weight:  Being overweight increases your risk of health conditions such as heart disease, high blood pressure, type 2 diabetes, and certain types of cancer  It can also increase your risk for osteoarthritis, sleep apnea, and other respiratory problems  Aim for a slow, steady weight loss  Even a small amount of weight loss can lower your risk of health problems  How to lose weight safely:  A safe and healthy way to lose weight is to eat fewer calories and get regular exercise  You can lose up about 1 pound a week by decreasing the number of calories you eat by 500 calories each day  Healthy meal plan for weight management:  A healthy meal plan includes a variety of foods, contains fewer calories, and helps you stay healthy  A healthy meal plan includes the following:  · Eat whole-grain foods more often  A healthy meal plan should contain fiber  Fiber is the part of grains, fruits, and vegetables that is not broken down by your body  Whole-grain foods are healthy and provide extra fiber in your diet  Some examples of whole-grain foods are whole-wheat breads and pastas, oatmeal, brown rice, and bulgur  · Eat a variety of vegetables every day  Include dark, leafy greens such as spinach, kale, konstantin greens, and mustard greens  Eat yellow and orange vegetables such as carrots, sweet potatoes, and winter squash  · Eat a variety of fruits every day  Choose fresh or canned fruit (canned in its own juice or light syrup) instead of juice  Fruit juice has very little or no fiber  · Eat low-fat dairy foods    Drink fat-free (skim) milk or 1% milk  Eat fat-free yogurt and low-fat cottage cheese  Try low-fat cheeses such as mozzarella and other reduced-fat cheeses  · Choose meat and other protein foods that are low in fat  Choose beans or other legumes such as split peas or lentils  Choose fish, skinless poultry (chicken or turkey), or lean cuts of red meat (beef or pork)  Before you cook meat or poultry, cut off any visible fat  · Use less fat and oil  Try baking foods instead of frying them  Add less fat, such as margarine, sour cream, regular salad dressing and mayonnaise to foods  Eat fewer high-fat foods  Some examples of high-fat foods include french fries, doughnuts, ice cream, and cakes  · Eat fewer sweets  Limit foods and drinks that are high in sugar  This includes candy, cookies, regular soda, and sweetened drinks  Exercise:  Exercise at least 30 minutes per day on most days of the week  Some examples of exercise include walking, biking, dancing, and swimming  You can also fit in more physical activity by taking the stairs instead of the elevator or parking farther away from stores  Ask your healthcare provider about the best exercise plan for you  © Copyright [employer] 2018 Information is for End User's use only and may not be sold, redistributed or otherwise used for commercial purposes  All illustrations and images included in CareNotes® are the copyrighted property of [employer]  or [facility] Preventive Visit Patient Instructions  Thank you for completing your Welcome to Medicare Visit or Medicare Annual Wellness Visit today  Your next wellness visit will be due in one year (7/21/2022)  The screening/preventive services that you may require over the next 5-10 years are detailed below  Some tests may not apply to you based off risk factors and/or age  Screening tests ordered at today's visit but not completed yet may show as past due   Also, please note that scanned in results may not display below  Preventive Screenings:  Service Recommendations Previous Testing/Comments   Colorectal Cancer Screening  · Colonoscopy    · Fecal Occult Blood Test (FOBT)/Fecal Immunochemical Test (FIT)  · Fecal DNA/Cologuard Test  · Flexible Sigmoidoscopy Age: 54-65 years old   Colonoscopy: every 10 years (May be performed more frequently if at higher risk)  OR  FOBT/FIT: every 1 year  OR  Cologuard: every 3 years  OR  Sigmoidoscopy: every 5 years  Screening may be recommended earlier than age 48 if at higher risk for colorectal cancer  Also, an individualized decision between you and your healthcare provider will decide whether screening between the ages of 74-80 would be appropriate  Colonoscopy: Not on file  FOBT/FIT: Not on file  Cologuard: Not on file  Sigmoidoscopy: Not on file    Screening Not Indicated     Prostate Cancer Screening Individualized decision between patient and health care provider in men between ages of 53-78   Medicare will cover every 12 months beginning on the day after your 50th birthday PSA: No results in last 5 years     Screening Not Indicated     Hepatitis C Screening Once for adults born between [birth year] and 1965  More frequently in patients at high risk for Hepatitis C Hep C Antibody: Not on file        Diabetes Screening 1-2 times per year if you're at risk for diabetes or have pre-diabetes Fasting glucose: No results in last 5 years   A1C: 8 8 %    Screening Not Indicated  History Diabetes   Cholesterol Screening Once every 5 years if you don't have a lipid disorder  May order more often based on risk factors  Lipid panel: 07/21/2020    Screening Not Indicated  History Lipid Disorder      Other Preventive Screenings Covered by Medicare:  6  Abdominal Aortic Aneurysm (AAA) Screening: covered once if your at risk  You're considered to be at risk if you have a family history of AAA or a male between the age of 73-68 who smoking at least 100 cigarettes in your lifetime    7  Lung Cancer Screening: covers low dose CT scan once per year if you meet all of the following conditions: (1) Age 50-69; (2) No signs or symptoms of lung cancer; (3) Current smoker or have quit smoking within the last 15 years; (4) You have a tobacco smoking history of at least 30 pack years (packs per day x number of years you smoked); (5) You get a written order from a healthcare provider  8  Glaucoma Screening: covered annually if you're considered high risk: (1) You have diabetes OR (2) Family history of glaucoma OR (3)  aged 48 and older OR (3)  American aged 72 and older  5  Osteoporosis Screening: covered every 2 years if you meet one of the following conditions: (1) Have a vertebral abnormality; (2) On glucocorticoid therapy for more than 3 months; (3) Have primary hyperparathyroidism; (4) On osteoporosis medications and need to assess response to drug therapy  10  HIV Screening: covered annually if you're between the age of 12-76  Also covered annually if you are younger than 13 and older than 72 with risk factors for HIV infection  For pregnant patients, it is covered up to 3 times per pregnancy  Immunizations:  Immunization Recommendations   Influenza Vaccine Annual influenza vaccination during flu season is recommended for all persons aged >= 6 months who do not have contraindications   Pneumococcal Vaccine (Prevnar and Pneumovax)  * Prevnar = PCV13  * Pneumovax = PPSV23 Adults 25-60 years old: 1-3 doses may be recommended based on certain risk factors  Adults 72 years old: Prevnar (PCV13) vaccine recommended followed by Pneumovax (PPSV23) vaccine  If already received PPSV23 since turning 65, then PCV13 recommended at least one year after PPSV23 dose     Hepatitis B Vaccine 3 dose series if at intermediate or high risk (ex: diabetes, end stage renal disease, liver disease)   Tetanus (Td) Vaccine - COST NOT COVERED BY MEDICARE PART B Following completion of primary series, a booster dose should be given every 10 years to maintain immunity against tetanus  Td may also be given as tetanus wound prophylaxis  Tdap Vaccine - COST NOT COVERED BY MEDICARE PART B Recommended at least once for all adults  For pregnant patients, recommended with each pregnancy  Shingles Vaccine (Shingrix) - COST NOT COVERED BY MEDICARE PART B  2 shot series recommended in those aged 48 and above     Health Maintenance Due:  There are no preventive care reminders to display for this patient  Immunizations Due:      Topic Date Due    DTaP,Tdap,and Td Vaccines (1 - Tdap) Never done    Influenza Vaccine (1) 09/01/2021     Advance Directives   What are advance directives? Advance directives are legal documents that state your wishes and plans for medical care  These plans are made ahead of time in case you lose your ability to make decisions for yourself  Advance directives can apply to any medical decision, such as the treatments you want, and if you want to donate organs  What are the types of advance directives? There are many types of advance directives, and each state has rules about how to use them  You may choose a combination of any of the following:  · Living will: This is a written record of the treatment you want  You can also choose which treatments you do not want, which to limit, and which to stop at a certain time  This includes surgery, medicine, IV fluid, and tube feedings  · Durable power of  for healthcare [place] [facility]): This is a written record that states who you want to make healthcare choices for you when you are unable to make them for yourself  This person, called a proxy, is usually a family member or a friend  You may choose more than 1 proxy  · Do not resuscitate (DNR) order:  A DNR order is used in case your heart stops beating or you stop breathing  It is a request not to have certain forms of treatment, such as CPR   A DNR order may be included in other types of advance directives  · Medical directive: This covers the care that you want if you are in a coma, near death, or unable to make decisions for yourself  You can list the treatments you want for each condition  Treatment may include pain medicine, surgery, blood transfusions, dialysis, IV or tube feedings, and a ventilator (breathing machine)  · Values history: This document has questions about your views, beliefs, and how you feel and think about life  This information can help others choose the care that you would choose  Why are advance directives important? An advance directive helps you control your care  Although spoken wishes may be used, it is better to have your wishes written down  Spoken wishes can be misunderstood, or not followed  Treatments may be given even if you do not want them  An advance directive may make it easier for your family to make difficult choices about your care  Fall Prevention    Fall prevention  includes ways to make your home and other areas safer  It also includes ways you can move more carefully to prevent a fall  Health conditions that cause changes in your blood pressure, vision, or muscle strength and coordination may increase your risk for falls  Medicines may also increase your risk for falls if they make you dizzy, weak, or sleepy  Fall prevention tips:   · Stand or sit up slowly  · Use assistive devices as directed  · Wear shoes that fit well and have soles that   · Wear a personal alarm  · Stay active  · Manage your medical conditions  Home Safety Tips:  · Add items to prevent falls in the bathroom  · Keep paths clear  · Install bright lights in your home  · Keep items you use often on shelves within reach  · Paint or place reflective tape on the edges of your stairs      Weight Management   Why it is important to manage your weight:  Being overweight increases your risk of health conditions such as heart disease, high blood pressure, type 2 diabetes, and certain types of cancer  It can also increase your risk for osteoarthritis, sleep apnea, and other respiratory problems  Aim for a slow, steady weight loss  Even a small amount of weight loss can lower your risk of health problems  How to lose weight safely:  A safe and healthy way to lose weight is to eat fewer calories and get regular exercise  You can lose up about 1 pound a week by decreasing the number of calories you eat by 500 calories each day  Healthy meal plan for weight management:  A healthy meal plan includes a variety of foods, contains fewer calories, and helps you stay healthy  A healthy meal plan includes the following:  · Eat whole-grain foods more often  A healthy meal plan should contain fiber  Fiber is the part of grains, fruits, and vegetables that is not broken down by your body  Whole-grain foods are healthy and provide extra fiber in your diet  Some examples of whole-grain foods are whole-wheat breads and pastas, oatmeal, brown rice, and bulgur  · Eat a variety of vegetables every day  Include dark, leafy greens such as spinach, kale, konstantin greens, and mustard greens  Eat yellow and orange vegetables such as carrots, sweet potatoes, and winter squash  · Eat a variety of fruits every day  Choose fresh or canned fruit (canned in its own juice or light syrup) instead of juice  Fruit juice has very little or no fiber  · Eat low-fat dairy foods  Drink fat-free (skim) milk or 1% milk  Eat fat-free yogurt and low-fat cottage cheese  Try low-fat cheeses such as mozzarella and other reduced-fat cheeses  · Choose meat and other protein foods that are low in fat  Choose beans or other legumes such as split peas or lentils  Choose fish, skinless poultry (chicken or turkey), or lean cuts of red meat (beef or pork)  Before you cook meat or poultry, cut off any visible fat  · Use less fat and oil  Try baking foods instead of frying them   Add less fat, such as margarine, sour cream, regular salad dressing and mayonnaise to foods  Eat fewer high-fat foods  Some examples of high-fat foods include french fries, doughnuts, ice cream, and cakes  · Eat fewer sweets  Limit foods and drinks that are high in sugar  This includes candy, cookies, regular soda, and sweetened drinks  Exercise:  Exercise at least 30 minutes per day on most days of the week  Some examples of exercise include walking, biking, dancing, and swimming  You can also fit in more physical activity by taking the stairs instead of the elevator or parking farther away from stores  Ask your healthcare provider about the best exercise plan for you  © Copyright [employer] 2018 Information is for End User's use only and may not be sold, redistributed or otherwise used for commercial purposes   All illustrations and images included in CareNotes® are the copyrighted property of A D A M [employer]  or [address] [place] [address]

## 2021-07-20 NOTE — PROGRESS NOTES
BMI Counseling: Body mass index is 26 44 kg/m²  Follow-up plan was not completed due to elderly patient (72 years old) where weight reduction/weight gain would complicate underlying health condition such as: mental illness, dementia, or confusion  Falls Plan of Care: balance, strength, and gait training instructions were provided  Assessed feet and footwear  Assessment/Plan:     return visit in 3 months  We will call the results of his blood tests     Problem List Items Addressed This Visit        Digestive    Acid reflux disease    Relevant Medications    famotidine (PEPCID) 40 MG tablet       Endocrine    Hypothyroidism       Continue Synthroid 125 mcg daily         Relevant Medications    levothyroxine (Euthyrox) 125 mcg tablet    Other Relevant Orders    TSH, 3rd generation with Free T4 reflex    Type 2 diabetes mellitus with both eyes affected by moderate nonproliferative retinopathy and macular edema, with long-term current use of insulin (HCC)      Continue Novolin 70/30 40 units in a m  and 15 units in p m   I advised him to take his evening dose prior to his evening meal rather than at bedtime due to low blood sugars in the morning  I do not believe he is taking Starlix and have  Taking this off his med list   He  May still be taking metformin 500 mg b i d    Lab Results   Component Value Date    HGBA1C 7 9 (A) 07/20/2021            Relevant Orders    Hemoglobin A1C    Microalbumin / creatinine urine ratio    UA (URINE) with reflex to Scope    C-peptide       Cardiovascular and Mediastinum    CAD (coronary artery disease)      Follow-up appointment with Cardiology in 2 months            Genitourinary    Benign prostatic hyperplasia    Relevant Medications    tamsulosin (FLOMAX) 0 4 mg       Other    Hyperlipidemia    Relevant Medications    atorvastatin (LIPITOR) 20 mg tablet    Other Relevant Orders    CBC and differential    Comprehensive metabolic panel    Lipid panel    Memory difficulties Evaluation   By [facility] of the A[facility] is pending  Other Visit Diagnoses     Medicare annual wellness visit, subsequent    -  Primary    Type 2 diabetes mellitus without complication, without long-term current use of insulin (HCC)        Relevant Medications    glucose blood (OneTouch Ultra) test strip    Other Relevant Orders    POCT hemoglobin A1c (Completed)    HLD (hyperlipidemia)        Relevant Medications    atorvastatin (LIPITOR) 20 mg tablet    Other Relevant Orders    CBC and differential    Comprehensive metabolic panel    Lipid panel            Subjective:      Patient ID: Linnette Aden is a 80 y o  male  Patient comes in for a checkup  He has not been here in 11 months  He is accompanied by a  Neighbor who is helping him out and feels he is not doing well at home living by himself  He has difficulty remembering all the medicines he is taking  The following portions of the patient's history were reviewed and updated as appropriate:   Past Medical History:  He has a past medical history of BRBPR (bright red blood per rectum), Chest tightness or pressure, Cholecystitis, Diabetes mellitus ([address] ), Diverticulosis, Hearing loss, Hypothyroidism, Lyme disease, Macular degeneration, Shortness of breath, and Vertigo  ,  _______________________________________________________________________  Medical Problems:  does not have any pertinent problems on file ,  _______________________________________________________________________  Past Surgical History:   has a past surgical history that includes Cataract extraction; Laparoscopic cholecystectomy; Hernia repair; and Tonsillectomy  ,  _______________________________________________________________________  Family History:  family history includes Bipolar disorder in his family; Cancer in his brother and sister; No Known Problems in his mother;  Other in his family; Prostate cancer in his father; Tongue cancer in his sister  ,  _______________________________________________________________________  Social History:   reports that he has never smoked  He has never used smokeless tobacco  He reports that he does not drink alcohol and does not use drugs  ,  _______________________________________________________________________  Allergies:  has No Known Allergies     _______________________________________________________________________  Current Outpatient Medications   Medication Sig Dispense Refill    aspirin (ASPIRIN LOW DOSE) 81 MG tablet Take 1 tablet by mouth daily      atorvastatin (LIPITOR) 20 mg tablet Take 1 tablet (20 mg total) by mouth daily 90 tablet 3    cyanocobalamin (VITAMIN B-12) 1,000 mcg tablet Take by mouth      famotidine (PEPCID) 40 MG tablet Take 1 tablet (40 mg total) by mouth daily 90 tablet 5    glucose blood (OneTouch Ultra) test strip USE  STRIP TO CHECK GLUCOSE TWICE DAILY 200 each 5    insulin isophane-insulin regular (NovoLIN 70/30 FlexPen) 100 units/mL injection pen INJECT 40 UNITS SUBCUTANEOUSLY IN THE MORNING AND 15 IN THE EVENING 15 mL 0    Insulin Pen Needle (BD AUTOSHIELD DUO) 30G X 5 MM MISC by Does not apply route 2 (two) times a day 100 each 5    levothyroxine (Euthyrox) 125 mcg tablet Take 1 tablet (125 mcg total) by mouth daily 90 tablet 3    Magnesium Hydroxide (MILK OF MAGNESIA PO) Take by mouth daily as needed      meclizine (ANTIVERT) 12 5 MG tablet Take 1 tablet (12 5 mg total) by mouth every 8 (eight) hours as needed for dizziness 30 tablet 5    metFORMIN (GLUCOPHAGE-XR) 500 mg 24 hr tablet TAKE 1 TABLET BY MOUTH TWICE DAILY WITH MEALS 180 tablet 0    metoprolol succinate (TOPROL-XL) 25 mg 24 hr tablet Take 1 tablet (25 mg total) by mouth daily 90 tablet 3    nitroglycerin (NITROSTAT) 0 4 mg SL tablet Place 1 tablet under the tongue every 5 (five) minutes as needed      bisacodyl (DULCOLAX) 10 mg suppository Insert 10 mg into the rectum Every 4th Morning PRN      polyethylene glycol (MIRALAX) 17 g packet       potassium-sodium phosphateS (PHOSPHA 250 NEUTRAL) 155-852-130 mg tablet 1 tablet every morning        tamsulosin (FLOMAX) 0 4 mg Take 1 capsule (0 4 mg total) by mouth daily with dinner 90 capsule 5     No current facility-administered medications for this visit      _______________________________________________________________________  Review of Systems   Constitutional: Negative  HENT: Negative  Respiratory: Negative  Cardiovascular: Negative  Gastrointestinal: Negative  Endocrine: Negative  Genitourinary: Negative  Musculoskeletal: Positive for back pain and gait problem  Allergic/Immunologic: Negative  Neurological: Positive for dizziness  Psychiatric/Behavioral: Positive for confusion  Objective:  Vitals:    07/20/21 1740   BP: 136/60   BP Location: Left arm   Patient Position: Sitting   Cuff Size: Adult   Pulse: 70   Temp: (!) 97 2 °F (36 2 °C)   TempSrc: Tympanic   SpO2: 96%   Weight: 76 6 kg (168 lb 12 8 oz)   Height: 5' 7" (1 702 m)     Body mass index is 26 44 kg/m²  Physical Exam  Constitutional:       Appearance: Normal appearance  He is well-developed  HENT:      Head: Normocephalic and atraumatic  Eyes:      Pupils: Pupils are equal, round, and reactive to light  Cardiovascular:      Rate and Rhythm: Normal rate and regular rhythm  Pulses: no weak pulses          Dorsalis pedis pulses are 1+ on the right side and 1+ on the left side  Posterior tibial pulses are 1+ on the right side and 1+ on the left side  Heart sounds: Normal heart sounds  Pulmonary:      Effort: Pulmonary effort is normal       Breath sounds: Normal breath sounds  Abdominal:      General: Bowel sounds are normal       Palpations: Abdomen is soft  Musculoskeletal:      Cervical back: Neck supple  Comments: Antalgic gait    Ambulates with a cane   Feet:      Right foot:      Skin integrity: No ulcer, skin breakdown, erythema, warmth, callus or dry skin  Left foot:      Skin integrity: No ulcer, skin breakdown, erythema, warmth, callus or dry skin  Skin:     General: Skin is warm and dry  Neurological:      Mental Status: He is alert  Comments: Difficulty remembering events and his medications  Psychiatric:         Mood and Affect: Mood normal          Behavior: Behavior normal        Diabetic Foot Exam    Patient's shoes and socks removed  Right Foot/Ankle   Right Foot Inspection  Skin Exam: skin normal and skin intact no dry skin, no warmth, no callus, no erythema, no maceration, no abnormal color, no pre-ulcer, no ulcer and no callus                          Toe Exam: ROM and strength within normal limits  Sensory   Vibration: diminished  Proprioception: diminished   Monofilament testing: diminished  Vascular    The right DP pulse is 1+  The right PT pulse is 1+  Left Foot/Ankle  Left Foot Inspection  Skin Exam: skin normal and skin intactno dry skin, no warmth, no erythema, no maceration, normal color, no pre-ulcer, no ulcer and no callus                         Toe Exam: ROM and strength within normal limits                   Sensory   Vibration: diminished  Proprioception: diminished  Monofilament: diminished  Vascular    The left DP pulse is 1+  The left PT pulse is 1+  Assign Risk Category:  No deformity present; Loss of protective sensation;  No weak pulses       Risk: 1

## 2021-09-28 NOTE — PROGRESS NOTES
CARDIOLOGY OFFICE VISIT  [facility]  [facility], [address], [address], [address]  Tel: [phone]      NAME: Britney Gaytan  AGE: 80 y o  SEX: male  : 1930   MRN: [id number]      Chief Complaint:  Chief Complaint   Patient presents with    Follow-up     1 yr follow up         History of Present Illness:   Patient comes for follow up  States he is doing well from cardiac stand point and denies chest pain / pressure, SOB, palpitations, lightheadedness, syncope, swelling feet, orthopnea, PND, claudication  Presumed CAD -  States is doing well cardiac wise with no cardiac symptoms  Taking all medications regularly  Has not used SL NTG since the last clinic visit  HLP -  Has had hyperlipidemia for many years  Taking statin regularly along with diet control  Denies myalgia  PCP closely monitoring the blood work        Past Medical History:  Past Medical History:   Diagnosis Date    BRBPR (bright red blood per rectum)     Last Assessed: 2016    Chest tightness or pressure     Last Assessed: 3/27/2014    Cholecystitis     Last Assessed: 2015    Diabetes mellitus ([facility] [address] )     Diverticulosis     Hearing loss     Hypothyroidism     Lyme disease     Macular degeneration     Shortness of breath     Last Assessed: 3/27/2014    Vertigo     Last Assessed: 2015         Past Surgical History:  Past Surgical History:   Procedure Laterality Date    CATARACT EXTRACTION      Last Assessed: 2016    HERNIA REPAIR      LAPAROSCOPIC CHOLECYSTECTOMY      TONSILLECTOMY           Family History:  Family History   Problem Relation Age of Onset    No Known Problems Mother     Bipolar disorder Family         II    Other Family         cardiac disorder    Prostate cancer Father     Cancer Sister     Tongue cancer Sister     Cancer Brother          Social History:  Social History     Socioeconomic History    Marital status:      Spouse name: None    Number of children: None    Years of education: None    Highest education level: None   Occupational History    None   Tobacco Use    Smoking status: Never Smoker    Smokeless tobacco: Never Used   Vaping Use    Vaping Use: Never used   Substance and Sexual Activity    Alcohol use: No    Drug use: No    Sexual activity: None   Other Topics Concern    None   Social History Narrative    None     Social Determinants of Health     Financial Resource Strain:     Difficulty of Paying Living Expenses:    Food Insecurity:     Worried About Running Out of Food in the Last Year:     Ran Out of Food in the Last Year:    Transportation Needs:     Lack of Transportation (Medical):  Lack of Transportation (Non-Medical):    Physical Activity:     Days of Exercise per Week:     Minutes of Exercise per Session:    Stress:     Feeling of Stress :    Social Connections:     Frequency of Communication with Friends and Family:     Frequency of Social Gatherings with Friends and Family:     Attends Yazidism Services:     Active Member of Clubs or Organizations:     Attends Club or Organization Meetings:     Marital Status:    Intimate Partner Violence:     Fear of Current or Ex-Partner:     Emotionally Abused:     Physically Abused:     Sexually Abused:           Active Problems:  Patient Active Problem List   Diagnosis    CAD (coronary artery disease)    Hyperlipidemia    Hypothyroidism    Chronic cough    Acid reflux disease    Benign prostatic hyperplasia    DDD (degenerative disc disease), lumbar    Diverticulosis    Vertigo    Type 2 diabetes mellitus with both eyes affected by moderate nonproliferative retinopathy and macular edema, with long-term current use of insulin ([facility])    Cervical radiculopathy    Memory difficulties         The following portions of the patient's history were reviewed and updated as appropriate: past medical history, past surgical history, past family history,  past social history, current medications, allergies and problem list       Review of Systems:  Constitutional: Denies fever, chills  Eyes: Denies eye redness, eye discharge  ENT: Denies hearing loss, sneezing, nasal discharge, sore throat   Respiratory: Denies cough, expectoration, shortness of breath  Cardiovascular: Denies chest pain, palpitations, lower extremity swelling  Gastrointestinal: Denies abdominal pain, nausea, vomiting, diarrhea  Genito-Urinary: Denies dysuria, incontinence  Musculoskeletal: Denies back pain, joint pain, muscle pain  Neurologic: Denies lightheadedness, syncope, headache, seizures  Endocrine: Denies polydipsia, temperature intolerance  Allergy and Immunology: Denies hives, insect bite sensitivity  Hematological and Lymphatic: Denies bleeding problems, swollen glands   Psychological: Denies depression, suicidal ideation, anxiety, panic  Dermatological: Denies pruritus, rash, skin lesion changes      Vitals:  Vitals:    09/28/21 1114   BP: 138/60   Pulse: 79   SpO2: 97%       Body mass index is 26 16 kg/m²  Weight (last 2 days)     Date/Time   Weight    09/28/21 1114   75 8 (167)                Physical Examination:  General: Patient is not in acute distress  Awake, alert, oriented in time, place and person  Responding to commands  Head: Normocephalic  Atraumatic  Eyes: Both pupils normal sized, round and reactive to light  Nonicteric  ENT: Normal external ear canals  Neck: Supple  JVP not raised  Trachea central  No thyromegaly  Lungs: Bilateral bronchovascular breath sounds with no crackles or rhonchi  Chest wall: No tenderness  Cardiovascular: RRR  S1 and S2 normal  No murmur, rub or gallop  Gastrointestinal: Abdomen soft, nontender  No guarding or rigidity  Liver and spleen not palpable  Bowel sounds present  Neurologic: Patient is awake, alert, oriented in time, place and person  Responding to commands   Moving all extremities  Integumentary: No skin rash  Lymphatic: No cervical lymphadenopathy  Back: Symmetric   No CVA tenderness  Extremities: No clubbing, cyanosis or edema      Laboratory Results:  CBC with diff:   Lab Results   Component Value Date    WBC 6 84 01/03/2019    WBC 6 1 05/11/2016    RBC 4 12 01/03/2019    RBC 4 34 05/11/2016    HGB 12 5 01/03/2019    HGB 12 5 (L) 05/11/2016    HCT 38 5 01/03/2019    HCT 39 1 05/11/2016    MCV 93 01/03/2019    MCV 90 0 05/11/2016    MCH 30 3 01/03/2019    MCH 28 8 05/11/2016    RDW 12 6 01/03/2019    RDW 15 3 (H) 05/11/2016     01/03/2019     05/11/2016       CMP:  Lab Results   Component Value Date    CREATININE 0 92 01/03/2019    CREATININE 1 00 08/15/2017    BUN 18 01/03/2019    BUN 16 05/15/2018     08/15/2017    K 4 3 01/03/2019    K 4 7 05/15/2018     01/03/2019     05/15/2018    CO2 28 01/03/2019    CO2 27 05/15/2018    PROT 7 6 08/15/2017    ALKPHOS 73 01/02/2019    ALKPHOS 61 05/15/2018    ALT 22 01/02/2019    ALT 16 05/15/2018    AST 19 01/02/2019    AST 19 05/15/2018    BILIDIR 0 08 01/02/2019       Lab Results   Component Value Date    HGBA1C 7 8 (H) 08/24/2021       Lab Results   Component Value Date    TROPONINI <0 02 01/02/2019    TROPONINI <0 02 12/04/2018       Lipid Profile:   Lab Results   Component Value Date    CHOL 155 08/15/2017    CHOL 189 05/15/2017    CHOL 187 09/09/2016     Lab Results   Component Value Date    HDL 36 (L) 05/15/2018    HDL 41 08/15/2017    HDL 37 (L) 05/15/2017     Lab Results   Component Value Date    LDLCALC 74 05/15/2018     Lab Results   Component Value Date    TRIG 162 (H) 05/15/2018    TRIG 131 08/15/2017    TRIG 170 (H) 05/15/2017       Medications:    Current Outpatient Medications:     aspirin (ASPIRIN LOW DOSE) 81 MG tablet, Take 1 tablet by mouth daily, Disp: , Rfl:     atorvastatin (LIPITOR) 20 mg tablet, Take 1 tablet (20 mg total) by mouth daily, Disp: 90 tablet, Rfl: 3    bisacodyl (DULCOLAX) 10 mg suppository, Insert 10 mg into the rectum Every 4th Morning PRN, Disp: , Rfl:     cyanocobalamin (VITAMIN B-12) 1,000 mcg tablet, Take by mouth, Disp: , Rfl:     Euthyrox 125 MCG tablet, Take 1 tablet by mouth once daily, Disp: 90 tablet, Rfl: 0    famotidine (PEPCID) 40 MG tablet, Take 1 tablet (40 mg total) by mouth daily, Disp: 90 tablet, Rfl: 5    glucose blood (OneTouch Ultra) test strip, USE  STRIP TO CHECK GLUCOSE TWICE DAILY, Disp: 200 each, Rfl: 5    insulin isophane-insulin regular (NovoLIN 70/30 FlexPen) 100 units/mL injection pen, INJECT 40 UNITS SUBCUTANEOUSLY IN THE MORNING AND 15 IN THE EVENING, Disp: 15 mL, Rfl: 0    Insulin Pen Needle (BD AUTOSHIELD DUO) 30G X 5 MM MISC, by Does not apply route 2 (two) times a day, Disp: 100 each, Rfl: 5    Magnesium Hydroxide (MILK OF MAGNESIA PO), Take by mouth daily as needed, Disp: , Rfl:     meclizine (ANTIVERT) 12 5 MG tablet, Take 1 tablet (12 5 mg total) by mouth every 8 (eight) hours as needed for dizziness, Disp: 30 tablet, Rfl: 5    metFORMIN (GLUCOPHAGE-XR) 500 mg 24 hr tablet, TAKE 1 TABLET BY MOUTH TWICE DAILY WITH MEALS, Disp: 180 tablet, Rfl: 0    metoprolol succinate (TOPROL-XL) 25 mg 24 hr tablet, Take 1 tablet (25 mg total) by mouth daily, Disp: 90 tablet, Rfl: 3    nateglinide (STARLIX) 120 mg tablet, TAKE 1 TABLET BY MOUTH THREE TIMES DAILY BEFORE MEAL(S), Disp: 270 tablet, Rfl: 0    nitroglycerin (NITROSTAT) 0 4 mg SL tablet, Place 1 tablet under the tongue every 5 (five) minutes as needed, Disp: , Rfl:     polyethylene glycol (MIRALAX) 17 g packet, , Disp: , Rfl:     potassium-sodium phosphateS (PHOSPHA 250 NEUTRAL) 155-852-130 mg tablet, 1 tablet every morning  , Disp: , Rfl:     tamsulosin (FLOMAX) 0 4 mg, Take 1 capsule (0 4 mg total) by mouth daily with dinner, Disp: 90 capsule, Rfl: 5      Allergies:  No Known Allergies      Assessment and Plan:  1   Abnormal stress test - Presumed CAD  Pt's pharmacologic stress test had shown both a fixed and a small reversible defect  I had discussed the option of cardiac catheterization (to confirm the diagnosis + possible PCI) versus medical therapy  This risks, benefits, alternatives of both therapies were discussed  Patient chose the medical therapy, saying that he would try medications first and if they did not work, he would reconsider getting cardiac catheterization  At this age he preferred medications over procedures  Pt to continue his aspirin, Metoprolol succinate, Statin, SL NTG    2  Mixed hyperlipidemia  Cont statin and diet control  His PCP closely watches his blood work    Recommend aggressive risk factor modification and therapeutic lifestyle changes  Low-salt, low-calorie, low-fat, low-cholesterol diet with regular exercise and to optimize weight  I will defer the ordering and monitoring of necessity lab studies to you, but I am available and happy to review and manage any of the data at your request in the future  Discussed concepts of atherosclerosis, including signs and symptoms of cardiac disease  Previous studies were reviewed  Safety measures were reviewed  Questions were entertained and answered  Patient was advised to report any problems requiring medical attention  Follow-up with PCP and appropriate specialist and lab work as discussed  Return for follow up visit as scheduled or earlier, if needed  Thank you for allowing me to participate in the care and evaluation of your patient  Should you have any questions, please feel free to contact me        Jaye Linda MD  9/78/8739,21:51 AM

## 2021-10-25 PROBLEM — E03.9 HYPOTHYROIDISM: Status: RESOLVED | Noted: 2018-02-19 | Resolved: 2021-01-01

## 2022-02-25 ENCOUNTER — TELEPHONE (OUTPATIENT)
Dept: FAMILY MEDICINE CLINIC | Facility: CLINIC | Age: 87
End: 2022-02-25

## 2022-02-25 ENCOUNTER — TELEPHONE (OUTPATIENT)
Dept: OTHER | Facility: OTHER | Age: 87
End: 2022-02-25

## 2022-02-25 NOTE — TELEPHONE ENCOUNTER
Roney (on Consent Documents) calling to inform that Pt  Jose Mejia passed away on 2/15/2022  Confirmed no upcoming appointments to cancel and PCP has been informed